# Patient Record
Sex: FEMALE
[De-identification: names, ages, dates, MRNs, and addresses within clinical notes are randomized per-mention and may not be internally consistent; named-entity substitution may affect disease eponyms.]

---

## 2018-12-11 ENCOUNTER — APPOINTMENT (OUTPATIENT)
Dept: NEUROSURGERY | Facility: CLINIC | Age: 48
End: 2018-12-11
Payer: COMMERCIAL

## 2018-12-11 PROBLEM — Z00.00 ENCOUNTER FOR PREVENTIVE HEALTH EXAMINATION: Status: ACTIVE | Noted: 2018-12-11

## 2018-12-11 PROCEDURE — 99205 OFFICE O/P NEW HI 60 MIN: CPT

## 2018-12-12 VITALS — WEIGHT: 170 LBS | BODY MASS INDEX: 30.12 KG/M2 | HEIGHT: 63 IN

## 2018-12-12 DIAGNOSIS — Z86.79 PERSONAL HISTORY OF OTHER DISEASES OF THE CIRCULATORY SYSTEM: ICD-10-CM

## 2019-02-05 ENCOUNTER — APPOINTMENT (OUTPATIENT)
Dept: NEUROSURGERY | Facility: CLINIC | Age: 49
End: 2019-02-05
Payer: COMMERCIAL

## 2019-02-05 ENCOUNTER — RESULT REVIEW (OUTPATIENT)
Age: 49
End: 2019-02-05

## 2019-02-05 PROCEDURE — 63030 LAMOT DCMPRN NRV RT 1 LMBR: CPT | Mod: LT

## 2019-02-19 ENCOUNTER — APPOINTMENT (OUTPATIENT)
Dept: NEUROSURGERY | Facility: CLINIC | Age: 49
End: 2019-02-19
Payer: COMMERCIAL

## 2019-02-19 PROCEDURE — 99024 POSTOP FOLLOW-UP VISIT: CPT

## 2019-02-23 NOTE — HISTORY OF PRESENT ILLNESS
[FreeTextEntry1] : s/p microdiscectomy. No further radicular pain. Still with some numbness, given reassurances that this is expected. Otherwise very happy with results.

## 2019-02-26 ENCOUNTER — APPOINTMENT (OUTPATIENT)
Dept: NEUROSURGERY | Facility: CLINIC | Age: 49
End: 2019-02-26
Payer: COMMERCIAL

## 2019-02-26 PROCEDURE — 99024 POSTOP FOLLOW-UP VISIT: CPT

## 2019-02-26 NOTE — HISTORY OF PRESENT ILLNESS
[FreeTextEntry1] : s/p left L5-S1 laminectomy, microdiscectomy 2/5/2019.  She was doing well for 2 and a half weeks.  No pain.  However, developed recurrence of pain.  + left foot drop.\par \par MRI ordered and showed L4-5 herniated disc, recurrent left L5-S1 herniated disc.\par \par Recommend ER, admission for IV steroids, pain medication.  OR urgently for left L4-5, L5-S1 laminectomy, microdiscectomy.

## 2019-02-27 ENCOUNTER — INPATIENT (INPATIENT)
Facility: HOSPITAL | Age: 49
LOS: 1 days | Discharge: HOME | End: 2019-03-01
Attending: NEUROLOGICAL SURGERY | Admitting: NEUROLOGICAL SURGERY

## 2019-02-27 VITALS
RESPIRATION RATE: 18 BRPM | TEMPERATURE: 97 F | HEART RATE: 73 BPM | DIASTOLIC BLOOD PRESSURE: 81 MMHG | OXYGEN SATURATION: 99 % | SYSTOLIC BLOOD PRESSURE: 169 MMHG

## 2019-02-27 LAB
ALBUMIN SERPL ELPH-MCNC: 4.5 G/DL — SIGNIFICANT CHANGE UP (ref 3.5–5.2)
ALP SERPL-CCNC: 57 U/L — SIGNIFICANT CHANGE UP (ref 30–115)
ALT FLD-CCNC: 12 U/L — SIGNIFICANT CHANGE UP (ref 0–41)
ANION GAP SERPL CALC-SCNC: 12 MMOL/L — SIGNIFICANT CHANGE UP (ref 7–14)
AST SERPL-CCNC: 15 U/L — SIGNIFICANT CHANGE UP (ref 0–41)
BASOPHILS # BLD AUTO: 0.04 K/UL — SIGNIFICANT CHANGE UP (ref 0–0.2)
BASOPHILS NFR BLD AUTO: 0.4 % — SIGNIFICANT CHANGE UP (ref 0–1)
BILIRUB SERPL-MCNC: 0.4 MG/DL — SIGNIFICANT CHANGE UP (ref 0.2–1.2)
BUN SERPL-MCNC: 18 MG/DL — SIGNIFICANT CHANGE UP (ref 10–20)
CALCIUM SERPL-MCNC: 9.1 MG/DL — SIGNIFICANT CHANGE UP (ref 8.5–10.1)
CHLORIDE SERPL-SCNC: 103 MMOL/L — SIGNIFICANT CHANGE UP (ref 98–110)
CO2 SERPL-SCNC: 25 MMOL/L — SIGNIFICANT CHANGE UP (ref 17–32)
CREAT SERPL-MCNC: 0.6 MG/DL — LOW (ref 0.7–1.5)
EOSINOPHIL # BLD AUTO: 0.03 K/UL — SIGNIFICANT CHANGE UP (ref 0–0.7)
EOSINOPHIL NFR BLD AUTO: 0.3 % — SIGNIFICANT CHANGE UP (ref 0–8)
GLUCOSE SERPL-MCNC: 114 MG/DL — HIGH (ref 70–99)
HCT VFR BLD CALC: 42.2 % — SIGNIFICANT CHANGE UP (ref 37–47)
HGB BLD-MCNC: 14.2 G/DL — SIGNIFICANT CHANGE UP (ref 12–16)
IMM GRANULOCYTES NFR BLD AUTO: 0.5 % — HIGH (ref 0.1–0.3)
LYMPHOCYTES # BLD AUTO: 1.78 K/UL — SIGNIFICANT CHANGE UP (ref 1.2–3.4)
LYMPHOCYTES # BLD AUTO: 16 % — LOW (ref 20.5–51.1)
MCHC RBC-ENTMCNC: 30 PG — SIGNIFICANT CHANGE UP (ref 27–31)
MCHC RBC-ENTMCNC: 33.6 G/DL — SIGNIFICANT CHANGE UP (ref 32–37)
MCV RBC AUTO: 89.2 FL — SIGNIFICANT CHANGE UP (ref 81–99)
MONOCYTES # BLD AUTO: 0.52 K/UL — SIGNIFICANT CHANGE UP (ref 0.1–0.6)
MONOCYTES NFR BLD AUTO: 4.7 % — SIGNIFICANT CHANGE UP (ref 1.7–9.3)
NEUTROPHILS # BLD AUTO: 8.67 K/UL — HIGH (ref 1.4–6.5)
NEUTROPHILS NFR BLD AUTO: 78.1 % — HIGH (ref 42.2–75.2)
NRBC # BLD: 0 /100 WBCS — SIGNIFICANT CHANGE UP (ref 0–0)
PLATELET # BLD AUTO: 259 K/UL — SIGNIFICANT CHANGE UP (ref 130–400)
POTASSIUM SERPL-MCNC: 4.1 MMOL/L — SIGNIFICANT CHANGE UP (ref 3.5–5)
POTASSIUM SERPL-SCNC: 4.1 MMOL/L — SIGNIFICANT CHANGE UP (ref 3.5–5)
PROT SERPL-MCNC: 7.1 G/DL — SIGNIFICANT CHANGE UP (ref 6–8)
RBC # BLD: 4.73 M/UL — SIGNIFICANT CHANGE UP (ref 4.2–5.4)
RBC # FLD: 12.6 % — SIGNIFICANT CHANGE UP (ref 11.5–14.5)
SODIUM SERPL-SCNC: 140 MMOL/L — SIGNIFICANT CHANGE UP (ref 135–146)
WBC # BLD: 11.1 K/UL — HIGH (ref 4.8–10.8)
WBC # FLD AUTO: 11.1 K/UL — HIGH (ref 4.8–10.8)

## 2019-02-27 RX ORDER — DEXAMETHASONE 0.5 MG/5ML
4 ELIXIR ORAL EVERY 4 HOURS
Qty: 0 | Refills: 0 | Status: DISCONTINUED | OUTPATIENT
Start: 2019-02-27 | End: 2019-02-27

## 2019-02-27 RX ORDER — DEXAMETHASONE 0.5 MG/5ML
4 ELIXIR ORAL EVERY 6 HOURS
Qty: 0 | Refills: 0 | Status: DISCONTINUED | OUTPATIENT
Start: 2019-02-27 | End: 2019-02-28

## 2019-02-27 RX ORDER — ACETAMINOPHEN 500 MG
975 TABLET ORAL ONCE
Qty: 0 | Refills: 0 | Status: COMPLETED | OUTPATIENT
Start: 2019-02-27 | End: 2019-02-27

## 2019-02-27 NOTE — ED PROVIDER NOTE - ATTENDING CONTRIBUTION TO CARE
I personally evaluated the patient. I reviewed the Resident’s or Physician Assistant’s note (as assigned above), and agree with the findings and plan except as documented in my note.  48 yr F, hx of HTN and lumbosacral disc herniations, was sent in by her neurosurgeon Dr. Christensen to be admitted to go to the OR tomorrow for a discectomy. Pt had L5-S1 discectomy 3 weeks ago presents with lower back  pain with radiation to the left leg associated with left leg weakness. Symptoms started 1 wk ago and pt was evaluated by Dr. Christensen and found to now have L4 to L5 disc herniation. Denies trauma, although she recalls bending down prior to the onset of pain. No urinary or fecal incontinence, no saddle anesthesia by hx. VS reviewed, Head ncat, neck supple, no JVD, normal s1s2 without any murmurs, Lungs CTAB with normal work of breathing. abd +BS, s/nd/nt, BACK W/O MIDLINE TTP, + LEFT PARASPINAL TTP IN THE LUMBAR REGION, extremities wnl, AAOx 3, normal motor and sensory exams. Antalgic gait due to pain. No acute skin rashes. Plan is preop labs, neurosx consult and likely admit.

## 2019-02-27 NOTE — ED PROVIDER NOTE - OBJECTIVE STATEMENT
49 y/o F h/o L5-S1 discectomy 3 weeks ago by Dr Christensen p/w back pain. Pt was having intercourse when she felt new lower back pain that was excruciating. Pt was instructed by Dr Christensen to have another MRI, which showed a new L4-L5 disc herniation. Pt reports pain worsening when ambulating or moving her LE's. Left LE worse than right. A/w numbness/parasthesias. No bowel/bladder incontinence. No fevers, chills, nt sweats, paresis of LE's. No pain overlying prior incision site.

## 2019-02-27 NOTE — ED PROVIDER NOTE - CLINICAL SUMMARY MEDICAL DECISION MAKING FREE TEXT BOX
Pt with disc herniation was sent in by neurosurgeon Dr. Christensen for admission to go to the OR tomorrow. Nsx evaluated pt and recommended pain control and steriods.

## 2019-02-27 NOTE — H&P ADULT - HISTORY OF PRESENT ILLNESS
49 y/o female with hx of L5-S1 Lumbar microdiscectomy 3 weeks ago. Now presenting with new onset of back pain following sexual intercourse 3 days ago. Found to have new L4-L5 disc herniation now. 49 y/o female with hx of L5-S1 Lumbar microdiscectomy 3 weeks ago. Now presenting with new onset of back pain radiating to her left leg following bending over 3 days ago. Found to have new L4-L5 disc herniation now.

## 2019-02-27 NOTE — H&P ADULT - NSHPPHYSICALEXAM_GEN_ALL_CORE
GENERAL: NAD, awake/alert  HEAD:  Atraumatic, Normocephalic  EYES: EOMI, PERRLA, conjunctiva and sclera clear  NERVOUS SYSTEM:  Awake  alert oriented to self, place situation   Fund of knowledge, recent and remote memory are intact   Mood; normal affect   CN II-XII intact PERRRL, EOMI, no ptosis, no Nystagmus, normal shoulder shrug   Face symmetrical tongue is midline speech is clear and fluent  Motor: 5/5 UE/LE all muscle groups, GREGORIO   Sensory: No deficit to light touch

## 2019-02-27 NOTE — ED PROVIDER NOTE - PHYSICAL EXAMINATION
Constitutional: Well developed, well nourished. NAD. Good general hygiene  Head: Atraumatic.  Eyes: PERRLA. EOMI without discomfort.   ENT: No nasal discharge. Mucous membranes moist.  Neck: Supple. Painless ROM.  Cardiovascular: Regular rhythm. Regular rate. Normal S1 and S2. No murmurs. 2+ pulses in all extremities.   Pulmonary: Normal respiratory rate and effort. Lungs clear to auscultation bilaterally. No wheezing, rales, or rhonchi. Bilateral, equal lung expansion.   Abdominal: Soft. Nondistended. Nontender. No rebound or guarding.   Extremities: 4/5 strength in left LE, 5/5 strength in right LE. Sensation equal and intact in both LE's. (+) straight leg test on the left side.   Skin: Incision site is c/d/i. No oozing, bleeding, evidence of cellulitis or abscess.  Neuro: AAOx3. No focal neurological deficits. See extremities.   Psych: Normal mood. Normal affect.

## 2019-02-27 NOTE — ED PROVIDER NOTE - NS ED ROS FT
Constitutional: No fever, chills, night sweats.   Eyes:  No visual changes, eye pain or discharge.  ENMT:  No hearing changes, pain, no sore throat or runny nose, no difficulty swallowing  Cardiac:  No chest pain, SOB or edema. No chest pain with exertion.  Respiratory:  No cough or respiratory distress. No hemoptysis. No history of asthma or RAD.  GI:  No nausea, vomiting, diarrhea or abdominal pain.  :  No dysuria, frequency or burning.  MS:  B/l LE pain. Left worse than right. No myalgia, muscle weakness, joint pain.  Neuro:  No headache or weakness.  No LOC. Parasthesias down left leg.   Back: Lower back pain.  Skin:  No skin rash.   Endocrine: No history of thyroid disease or diabetes.

## 2019-02-27 NOTE — ED ADULT TRIAGE NOTE - CHIEF COMPLAINT QUOTE
pt is scheduled for a disectomy of the sciatica nerve tomorrow and was told by her doctor to come in today and get checked in as per patient

## 2019-02-27 NOTE — H&P ADULT - ASSESSMENT
47 y/o female with new L4-L5 disc herniation  - NPO  - Preop labs - CBC, CMP, PT/PTT/INR, T&S  - EKG, CXR  - Decadron 4mg Q6  - pain control  - Pt seen and examined by Dr. Christensen

## 2019-02-28 ENCOUNTER — RESULT REVIEW (OUTPATIENT)
Age: 49
End: 2019-02-28

## 2019-02-28 LAB
BLD GP AB SCN SERPL QL: SIGNIFICANT CHANGE UP
TYPE + AB SCN PNL BLD: SIGNIFICANT CHANGE UP

## 2019-02-28 PROCEDURE — 63035 LAMOT DCMPRN NRV RT EA ADDL: CPT | Mod: 82,58

## 2019-02-28 PROCEDURE — 63035 LAMOT DCMPRN NRV RT EA ADDL: CPT | Mod: 58

## 2019-02-28 PROCEDURE — 63030 LAMOT DCMPRN NRV RT 1 LMBR: CPT | Mod: 82,58

## 2019-02-28 PROCEDURE — 63030 LAMOT DCMPRN NRV RT 1 LMBR: CPT | Mod: 58

## 2019-02-28 RX ORDER — MORPHINE SULFATE 50 MG/1
2 CAPSULE, EXTENDED RELEASE ORAL EVERY 6 HOURS
Qty: 0 | Refills: 0 | Status: DISCONTINUED | OUTPATIENT
Start: 2019-02-28 | End: 2019-03-01

## 2019-02-28 RX ORDER — SODIUM CHLORIDE 9 MG/ML
1000 INJECTION, SOLUTION INTRAVENOUS
Qty: 0 | Refills: 0 | Status: DISCONTINUED | OUTPATIENT
Start: 2019-02-28 | End: 2019-02-28

## 2019-02-28 RX ORDER — SODIUM CHLORIDE 9 MG/ML
1000 INJECTION INTRAMUSCULAR; INTRAVENOUS; SUBCUTANEOUS
Qty: 0 | Refills: 0 | Status: DISCONTINUED | OUTPATIENT
Start: 2019-02-28 | End: 2019-02-28

## 2019-02-28 RX ORDER — METHOCARBAMOL 500 MG/1
500 TABLET, FILM COATED ORAL EVERY 6 HOURS
Qty: 0 | Refills: 0 | Status: DISCONTINUED | OUTPATIENT
Start: 2019-02-28 | End: 2019-03-01

## 2019-02-28 RX ORDER — HYDROMORPHONE HYDROCHLORIDE 2 MG/ML
1 INJECTION INTRAMUSCULAR; INTRAVENOUS; SUBCUTANEOUS
Qty: 0 | Refills: 0 | Status: DISCONTINUED | OUTPATIENT
Start: 2019-02-28 | End: 2019-02-28

## 2019-02-28 RX ORDER — ONDANSETRON 8 MG/1
4 TABLET, FILM COATED ORAL ONCE
Qty: 0 | Refills: 0 | Status: COMPLETED | OUTPATIENT
Start: 2019-02-28 | End: 2019-02-28

## 2019-02-28 RX ORDER — OXYCODONE AND ACETAMINOPHEN 5; 325 MG/1; MG/1
1 TABLET ORAL EVERY 6 HOURS
Qty: 0 | Refills: 0 | Status: DISCONTINUED | OUTPATIENT
Start: 2019-02-28 | End: 2019-02-28

## 2019-02-28 RX ORDER — SENNA PLUS 8.6 MG/1
2 TABLET ORAL AT BEDTIME
Qty: 0 | Refills: 0 | Status: DISCONTINUED | OUTPATIENT
Start: 2019-02-28 | End: 2019-03-01

## 2019-02-28 RX ORDER — OXYCODONE AND ACETAMINOPHEN 5; 325 MG/1; MG/1
2 TABLET ORAL ONCE
Qty: 0 | Refills: 0 | Status: DISCONTINUED | OUTPATIENT
Start: 2019-02-28 | End: 2019-02-28

## 2019-02-28 RX ORDER — SODIUM CHLORIDE 9 MG/ML
3 INJECTION INTRAMUSCULAR; INTRAVENOUS; SUBCUTANEOUS EVERY 8 HOURS
Qty: 0 | Refills: 0 | Status: DISCONTINUED | OUTPATIENT
Start: 2019-02-28 | End: 2019-03-01

## 2019-02-28 RX ORDER — OXYCODONE AND ACETAMINOPHEN 5; 325 MG/1; MG/1
1 TABLET ORAL EVERY 6 HOURS
Qty: 0 | Refills: 0 | Status: DISCONTINUED | OUTPATIENT
Start: 2019-02-28 | End: 2019-03-01

## 2019-02-28 RX ORDER — BENZOCAINE AND MENTHOL 5; 1 G/100ML; G/100ML
1 LIQUID ORAL
Qty: 0 | Refills: 0 | Status: DISCONTINUED | OUTPATIENT
Start: 2019-02-28 | End: 2019-03-01

## 2019-02-28 RX ORDER — MORPHINE SULFATE 50 MG/1
2 CAPSULE, EXTENDED RELEASE ORAL EVERY 6 HOURS
Qty: 0 | Refills: 0 | Status: DISCONTINUED | OUTPATIENT
Start: 2019-02-28 | End: 2019-02-28

## 2019-02-28 RX ORDER — CEFAZOLIN SODIUM 1 G
1000 VIAL (EA) INJECTION EVERY 8 HOURS
Qty: 0 | Refills: 0 | Status: COMPLETED | OUTPATIENT
Start: 2019-02-28 | End: 2019-03-01

## 2019-02-28 RX ORDER — CEFAZOLIN SODIUM 1 G
1000 VIAL (EA) INJECTION EVERY 8 HOURS
Qty: 0 | Refills: 0 | Status: DISCONTINUED | OUTPATIENT
Start: 2019-02-28 | End: 2019-02-28

## 2019-02-28 RX ORDER — HYDROMORPHONE HYDROCHLORIDE 2 MG/ML
0.5 INJECTION INTRAMUSCULAR; INTRAVENOUS; SUBCUTANEOUS
Qty: 0 | Refills: 0 | Status: DISCONTINUED | OUTPATIENT
Start: 2019-02-28 | End: 2019-02-28

## 2019-02-28 RX ORDER — DOCUSATE SODIUM 100 MG
100 CAPSULE ORAL THREE TIMES A DAY
Qty: 0 | Refills: 0 | Status: DISCONTINUED | OUTPATIENT
Start: 2019-02-28 | End: 2019-03-01

## 2019-02-28 RX ADMIN — SODIUM CHLORIDE 3 MILLILITER(S): 9 INJECTION INTRAMUSCULAR; INTRAVENOUS; SUBCUTANEOUS at 23:00

## 2019-02-28 RX ADMIN — HYDROMORPHONE HYDROCHLORIDE 0.5 MILLIGRAM(S): 2 INJECTION INTRAMUSCULAR; INTRAVENOUS; SUBCUTANEOUS at 11:37

## 2019-02-28 RX ADMIN — Medication 100 MILLIGRAM(S): at 13:56

## 2019-02-28 RX ADMIN — ONDANSETRON 4 MILLIGRAM(S): 8 TABLET, FILM COATED ORAL at 14:04

## 2019-02-28 RX ADMIN — METHOCARBAMOL 500 MILLIGRAM(S): 500 TABLET, FILM COATED ORAL at 18:25

## 2019-02-28 RX ADMIN — Medication 4 MILLIGRAM(S): at 06:16

## 2019-02-28 RX ADMIN — METHOCARBAMOL 500 MILLIGRAM(S): 500 TABLET, FILM COATED ORAL at 23:04

## 2019-02-28 RX ADMIN — Medication 975 MILLIGRAM(S): at 01:07

## 2019-02-28 RX ADMIN — METHOCARBAMOL 500 MILLIGRAM(S): 500 TABLET, FILM COATED ORAL at 12:32

## 2019-02-28 RX ADMIN — Medication 100 MILLIGRAM(S): at 22:22

## 2019-02-28 RX ADMIN — SENNA PLUS 2 TABLET(S): 8.6 TABLET ORAL at 22:22

## 2019-02-28 RX ADMIN — Medication 975 MILLIGRAM(S): at 03:18

## 2019-02-28 RX ADMIN — SODIUM CHLORIDE 125 MILLILITER(S): 9 INJECTION INTRAMUSCULAR; INTRAVENOUS; SUBCUTANEOUS at 01:07

## 2019-02-28 RX ADMIN — Medication 100 MILLIGRAM(S): at 13:53

## 2019-02-28 RX ADMIN — Medication 4 MILLIGRAM(S): at 01:07

## 2019-02-28 RX ADMIN — SODIUM CHLORIDE 100 MILLILITER(S): 9 INJECTION, SOLUTION INTRAVENOUS at 11:09

## 2019-02-28 RX ADMIN — SODIUM CHLORIDE 3 MILLILITER(S): 9 INJECTION INTRAMUSCULAR; INTRAVENOUS; SUBCUTANEOUS at 18:21

## 2019-02-28 NOTE — ED ADULT NURSE NOTE - CHPI ED NUR SYMPTOMS NEG
no anorexia/no fatigue/no motor function loss/no difficulty bearing weight/no numbness/no bladder dysfunction/no constipation/no bowel dysfunction/no neck tenderness/no tingling

## 2019-02-28 NOTE — PRE-ANESTHESIA EVALUATION ADULT - NSANTHOSAYNRD_GEN_A_CORE
No. KYRA screening performed.  STOP BANG Legend: 0-2 = LOW Risk; 3-4 = INTERMEDIATE Risk; 5-8 = HIGH Risk

## 2019-02-28 NOTE — BRIEF OPERATIVE NOTE - PROCEDURE
<<-----Click on this checkbox to enter Procedure Lumbar laminectomy  02/28/2019    Active  RGROBSOND

## 2019-02-28 NOTE — PROGRESS NOTE ADULT - SUBJECTIVE AND OBJECTIVE BOX
NEUROSURGERY POST OP NOTE:    POD# 0 S/P lumbar discectomy    S: mild pain       T(C): 35.8 (02-28-19 @ 14:15), Max: 36.8 (02-28-19 @ 11:09)  HR: 73 (02-28-19 @ 14:15) (72 - 100)  BP: 135/60 (02-28-19 @ 14:15) (129/78 - 169/81)  RR: 18 (02-28-19 @ 14:15) (12 - 18)  SpO2: 98% (02-28-19 @ 12:39) (95% - 100%)      02-28-19 @ 07:01  -  02-28-19 @ 19:54  --------------------------------------------------------  IN: 100 mL / OUT: 1 mL / NET: 99 mL        bisacodyl 5 milliGRAM(s) Oral every 12 hours PRN  ceFAZolin   IVPB 1000 milliGRAM(s) IV Intermittent every 8 hours  docusate sodium 100 milliGRAM(s) Oral three times a day  methocarbamol 500 milliGRAM(s) Oral every 6 hours  morphine  - Injectable 2 milliGRAM(s) IV Push every 6 hours PRN  oxyCODONE    5 mG/acetaminophen 325 mG 1 Tablet(s) Oral every 6 hours PRN  senna 2 Tablet(s) Oral at bedtime  sodium chloride 0.9% lock flush 3 milliLiter(s) IV Push every 8 hours      Exam: dressing dry, mild left lateral knee pain, good movement of lower extremities, no numbness, no calf tenderness, tolerating diet    WOUND- dressing dry, no swelling        Assessment: 48yFemale s/p 2 level MLD      Plan: PT, Rehab, analgesics

## 2019-02-28 NOTE — CHART NOTE - NSCHARTNOTEFT_GEN_A_CORE
PACU ANESTHESIA ADMISSION NOTE      Procedure: Lumbar laminectomy    Post op diagnosis:  Lumbar disc herniation      ____  Intubated  TV:______       Rate: ______      FiO2: ______    _x___  Patent Airway    __x__  Full return of protective reflexes    _x___  Full recovery from anesthesia / back to baseline     Vitals:   T:  98.2         R:  10                BP:  160/76                Sat: 98                  P:100       Mental Status:  _x___ Awake   __x___ Alert   _____ Drowsy   _____ Sedated    Nausea/Vomiting:  _x___ NO  ______Yes,   See Post - Op Orders          Pain Scale (0-10):  _____    Treatment: ____ None    x____ See Post - Op/PCA Orders    Post - Operative Fluids:   ____ Oral   x____ See Post - Op Orders    Plan: Discharge:   ____Home       _x____Floor     _____Critical Care    _____  Other:_moving all four extremities ________________    Comments:

## 2019-03-01 ENCOUNTER — TRANSCRIPTION ENCOUNTER (OUTPATIENT)
Age: 49
End: 2019-03-01

## 2019-03-01 VITALS
SYSTOLIC BLOOD PRESSURE: 152 MMHG | TEMPERATURE: 97 F | DIASTOLIC BLOOD PRESSURE: 70 MMHG | HEART RATE: 72 BPM | RESPIRATION RATE: 18 BRPM

## 2019-03-01 LAB — SURGICAL PATHOLOGY STUDY: SIGNIFICANT CHANGE UP

## 2019-03-01 RX ORDER — DOCUSATE SODIUM 100 MG
1 CAPSULE ORAL
Qty: 0 | Refills: 0 | DISCHARGE
Start: 2019-03-01

## 2019-03-01 RX ORDER — METHOCARBAMOL 500 MG/1
1 TABLET, FILM COATED ORAL
Qty: 40 | Refills: 0 | OUTPATIENT
Start: 2019-03-01 | End: 2019-03-10

## 2019-03-01 RX ORDER — SENNA PLUS 8.6 MG/1
2 TABLET ORAL
Qty: 0 | Refills: 0 | DISCHARGE
Start: 2019-03-01

## 2019-03-01 RX ADMIN — SODIUM CHLORIDE 3 MILLILITER(S): 9 INJECTION INTRAMUSCULAR; INTRAVENOUS; SUBCUTANEOUS at 06:50

## 2019-03-01 RX ADMIN — Medication 100 MILLIGRAM(S): at 06:49

## 2019-03-01 RX ADMIN — METHOCARBAMOL 500 MILLIGRAM(S): 500 TABLET, FILM COATED ORAL at 17:10

## 2019-03-01 RX ADMIN — METHOCARBAMOL 500 MILLIGRAM(S): 500 TABLET, FILM COATED ORAL at 06:50

## 2019-03-01 RX ADMIN — Medication 100 MILLIGRAM(S): at 13:23

## 2019-03-01 RX ADMIN — Medication 100 MILLIGRAM(S): at 06:50

## 2019-03-01 RX ADMIN — BENZOCAINE AND MENTHOL 1 LOZENGE: 5; 1 LIQUID ORAL at 06:50

## 2019-03-01 RX ADMIN — METHOCARBAMOL 500 MILLIGRAM(S): 500 TABLET, FILM COATED ORAL at 11:42

## 2019-03-01 NOTE — DISCHARGE NOTE ADULT - CARE PROVIDER_API CALL
Susan Christensen)  Neurological Surgery  [address], [address]  [place] [address]  Phone: [phone]  Fax: [phone]  Follow Up Time:

## 2019-03-01 NOTE — DISCHARGE NOTE ADULT - INSTRUCTIONS
May remove outer dressing in 2-3 days. May shower. Do no scrub incision. Keep clean and dry. No heavy lifting or bending reg diet

## 2019-03-01 NOTE — PROGRESS NOTE ADULT - SUBJECTIVE AND OBJECTIVE BOX
Subjective: 48yFemale with a pmhx of DISC DIRODER OF LUMBAR REGION  ^SCIATIC PAIN  MEWS Score  Lumbar disc herniation  Lumbar disc herniation  Disc disorder of lumbar region  Lumbar laminectomy  SCIATIC PAIN    POD#1 s/p L4-S1 Left lumbar discectomy    Pt seen and examined at bedside. PT admits she has mild incisional pain at this time. She admits her previous left lower extremity pain is now gone after surgery. She also admits her numbness and tingling to the left lower extremity has greatly improved.    Allergies    Allergy Status Unknown    Intolerances        Vital Signs Last 24 Hrs  T(C): 36.6 (01 Mar 2019 05:34), Max: 36.8 (28 Feb 2019 11:09)  T(F): 97.8 (01 Mar 2019 05:34), Max: 98.2 (28 Feb 2019 11:09)  HR: 71 (01 Mar 2019 05:34) (63 - 100)  BP: 112/54 (01 Mar 2019 05:34) (112/54 - 162/77)  BP(mean): --  RR: 18 (01 Mar 2019 05:34) (12 - 19)  SpO2: 98% (28 Feb 2019 12:39) (95% - 99%)      benzocaine 15 mG/menthol 3.6 mG Lozenge 1 Lozenge Oral every 1 hour PRN  bisacodyl 5 milliGRAM(s) Oral every 12 hours PRN  docusate sodium 100 milliGRAM(s) Oral three times a day  methocarbamol 500 milliGRAM(s) Oral every 6 hours  morphine  - Injectable 2 milliGRAM(s) IV Push every 6 hours PRN  oxyCODONE    5 mG/acetaminophen 325 mG 1 Tablet(s) Oral every 6 hours PRN  senna 2 Tablet(s) Oral at bedtime  sodium chloride 0.9% lock flush 3 milliLiter(s) IV Push every 8 hours        02-28-19 @ 07:01  -  03-01-19 @ 07:00  --------------------------------------------------------  IN: 100 mL / OUT: 2 mL / NET: 98 mL          Exam:  AAOX3. Verbal function intact  tongue midline, facial motions symmetric  PERRL  Motor: MAEx4, 5/5 power in b/l UE and LE  Sensation: intact to touch in all extremities        CBC Full  -  ( 27 Feb 2019 22:30 )  WBC Count : 11.10 K/uL  Hemoglobin : 14.2 g/dL  Hematocrit : 42.2 %  Platelet Count - Automated : 259 K/uL  Mean Cell Volume : 89.2 fL  Mean Cell Hemoglobin : 30.0 pg  Mean Cell Hemoglobin Concentration : 33.6 g/dL  Auto Neutrophil # : 8.67 K/uL  Auto Lymphocyte # : 1.78 K/uL  Auto Monocyte # : 0.52 K/uL  Auto Eosinophil # : 0.03 K/uL  Auto Basophil # : 0.04 K/uL  Auto Neutrophil % : 78.1 %  Auto Lymphocyte % : 16.0 %  Auto Monocyte % : 4.7 %  Auto Eosinophil % : 0.3 %  Auto Basophil % : 0.4 %    02-27    140  |  103  |  18  ----------------------------<  114<H>  4.1   |  25  |  0.6<L>    Ca    9.1      27 Feb 2019 22:30    TPro  7.1  /  Alb  4.5  /  TBili  0.4  /  DBili  x   /  AST  15  /  ALT  12  /  AlkPhos  57  02-27            Wound:  dressing clean, dry and intact    Assessment/Plan: as above  PT/rehab eval   likely d/c home today  pain well controlled  d/w attending

## 2019-03-01 NOTE — DISCHARGE NOTE ADULT - CARE PLAN
Principal Discharge DX:	Disc disorder of lumbar region  Goal:	s/p L4-S1 left lumbar discectomy  Assessment and plan of treatment:	s/p L4-S1 left lumbar discectomy

## 2019-03-01 NOTE — DISCHARGE NOTE ADULT - HOSPITAL COURSE
47 y/o female with hx of L5-S1 Lumbar microdiscectomy 3 weeks ago. Now presenting with new onset of back pain radiating to her left leg following bending over 3 days ago. Found to have new L4-L5 disc herniation now.    Pt underwent L4-S1 Left lumbar discectomy. She went to [facility] post op and worked with PT. They cleared her for discharge with Rolling walker and 3 in 1 commode for which she was given scripts. She admits her symptoms are much improved after surgery. She no longer has radicular pain down the left leg and her numbness and tingling is improved from pre-op. She will be discharged home and follow up in 10-14 days.

## 2019-03-01 NOTE — PHYSICAL THERAPY INITIAL EVALUATION ADULT - GENERAL OBSERVATIONS, REHAB EVAL
10:00-10:31 Pt encountered seated in b/s chair in NAD. Pt observed ambulating on unit with RW with PCA.  Pt reports pain 4/10 on pain scale

## 2019-03-01 NOTE — DISCHARGE NOTE ADULT - MEDICATION SUMMARY - MEDICATIONS TO TAKE
I will START or STAY ON the medications listed below when I get home from the hospital:    oxycodone-acetaminophen 5 mg-325 mg oral tablet  -- 1 tab(s) by mouth every 6 hours, As needed, Severe Pain (7 - 10) MDD:4  -- Indication: For pain control    docusate sodium 100 mg oral capsule  -- 1 cap(s) by mouth 3 times a day  -- Indication: For constipation    senna oral tablet  -- 2 tab(s) by mouth once a day (at bedtime)  -- Indication: For constipation    methocarbamol 500 mg oral tablet  -- 1 tab(s) by mouth every 6 hours, As Needed -for muscle spasm   -- Indication: For muscle relaxer

## 2019-03-01 NOTE — DISCHARGE NOTE ADULT - PATIENT PORTAL LINK FT
You can access the [facility] Patient Portal, offered by [facility], by registering with the following website: [email or web address]

## 2019-03-01 NOTE — PROGRESS NOTE ADULT - SUBJECTIVE AND OBJECTIVE BOX
Does not require rehab. consult. at this time.  Amb. very nicely with PT postop.  For discharge home today.

## 2019-03-06 DIAGNOSIS — R20.0 ANESTHESIA OF SKIN: ICD-10-CM

## 2019-03-06 DIAGNOSIS — M51.26 OTHER INTERVERTEBRAL DISC DISPLACEMENT, LUMBAR REGION: ICD-10-CM

## 2019-03-06 DIAGNOSIS — Z79.891 LONG TERM (CURRENT) USE OF OPIATE ANALGESIC: ICD-10-CM

## 2019-03-06 DIAGNOSIS — M51.27 OTHER INTERVERTEBRAL DISC DISPLACEMENT, LUMBOSACRAL REGION: ICD-10-CM

## 2019-03-19 ENCOUNTER — APPOINTMENT (OUTPATIENT)
Dept: NEUROSURGERY | Facility: CLINIC | Age: 49
End: 2019-03-19
Payer: COMMERCIAL

## 2019-03-19 PROCEDURE — 99024 POSTOP FOLLOW-UP VISIT: CPT

## 2019-03-19 NOTE — HISTORY OF PRESENT ILLNESS
[FreeTextEntry1] : s/p L5-S1 microdiscectomy, followed by reherniation and new L4-5 herniation s/p surgery.  \par \par No leg pain\par \par Complains of back pain\par \par Some serosanguinous drainage from the wound.  No erythema\par \par Wound care - baci ointment, dressing changes\par clindamycin x 10 days\par \par Return in 1 week.

## 2019-03-22 ENCOUNTER — INPATIENT (INPATIENT)
Facility: HOSPITAL | Age: 49
LOS: 18 days | Discharge: HOME HEALTH | End: 2019-04-10
Attending: NEUROLOGICAL SURGERY | Admitting: NEUROLOGICAL SURGERY
Payer: COMMERCIAL

## 2019-03-22 VITALS
TEMPERATURE: 99 F | SYSTOLIC BLOOD PRESSURE: 115 MMHG | OXYGEN SATURATION: 97 % | HEART RATE: 91 BPM | RESPIRATION RATE: 18 BRPM | DIASTOLIC BLOOD PRESSURE: 63 MMHG

## 2019-03-22 DIAGNOSIS — T81.40XA INFECTION FOLLOWING A PROCEDURE, UNSPECIFIED, INITIAL ENCOUNTER: ICD-10-CM

## 2019-03-22 DIAGNOSIS — Z98.890 OTHER SPECIFIED POSTPROCEDURAL STATES: Chronic | ICD-10-CM

## 2019-03-22 LAB
ALBUMIN SERPL ELPH-MCNC: 3.8 G/DL — SIGNIFICANT CHANGE UP (ref 3.5–5.2)
ALP SERPL-CCNC: 84 U/L — SIGNIFICANT CHANGE UP (ref 30–115)
ALT FLD-CCNC: 23 U/L — SIGNIFICANT CHANGE UP (ref 0–41)
ANION GAP SERPL CALC-SCNC: 13 MMOL/L — SIGNIFICANT CHANGE UP (ref 7–14)
APTT BLD: SIGNIFICANT CHANGE UP SEC (ref 27–39.2)
AST SERPL-CCNC: 18 U/L — SIGNIFICANT CHANGE UP (ref 0–41)
BASE EXCESS BLDV CALC-SCNC: 2.6 MMOL/L — HIGH (ref -2–2)
BASOPHILS # BLD AUTO: 0.02 K/UL — SIGNIFICANT CHANGE UP (ref 0–0.2)
BASOPHILS NFR BLD AUTO: 0.3 % — SIGNIFICANT CHANGE UP (ref 0–1)
BILIRUB SERPL-MCNC: 0.6 MG/DL — SIGNIFICANT CHANGE UP (ref 0.2–1.2)
BLD GP AB SCN SERPL QL: SIGNIFICANT CHANGE UP
BUN SERPL-MCNC: 10 MG/DL — SIGNIFICANT CHANGE UP (ref 10–20)
CA-I SERPL-SCNC: 1.14 MMOL/L — SIGNIFICANT CHANGE UP (ref 1.12–1.3)
CALCIUM SERPL-MCNC: 8.7 MG/DL — SIGNIFICANT CHANGE UP (ref 8.5–10.1)
CHLORIDE SERPL-SCNC: 103 MMOL/L — SIGNIFICANT CHANGE UP (ref 98–110)
CO2 SERPL-SCNC: 23 MMOL/L — SIGNIFICANT CHANGE UP (ref 17–32)
CREAT SERPL-MCNC: 0.5 MG/DL — LOW (ref 0.7–1.5)
CRP SERPL-MCNC: 7.86 MG/DL — HIGH (ref 0–0.4)
EOSINOPHIL # BLD AUTO: 0.01 K/UL — SIGNIFICANT CHANGE UP (ref 0–0.7)
EOSINOPHIL NFR BLD AUTO: 0.1 % — SIGNIFICANT CHANGE UP (ref 0–8)
ERYTHROCYTE [SEDIMENTATION RATE] IN BLOOD: 63 MM/HR — HIGH (ref 0–20)
GAS PNL BLDV: 137 MMOL/L — SIGNIFICANT CHANGE UP (ref 136–145)
GAS PNL BLDV: SIGNIFICANT CHANGE UP
GLUCOSE SERPL-MCNC: 123 MG/DL — HIGH (ref 70–99)
HCO3 BLDV-SCNC: 27 MMOL/L — SIGNIFICANT CHANGE UP (ref 22–29)
HCT VFR BLD CALC: 38.5 % — SIGNIFICANT CHANGE UP (ref 37–47)
HCT VFR BLDA CALC: 43.1 % — SIGNIFICANT CHANGE UP (ref 34–44)
HGB BLD CALC-MCNC: 14.1 G/DL — SIGNIFICANT CHANGE UP (ref 14–18)
HGB BLD-MCNC: 13 G/DL — SIGNIFICANT CHANGE UP (ref 12–16)
IMM GRANULOCYTES NFR BLD AUTO: 0.5 % — HIGH (ref 0.1–0.3)
INR BLD: 1.1 RATIO — SIGNIFICANT CHANGE UP (ref 0.65–1.3)
LACTATE BLDV-MCNC: 0.8 MMOL/L — SIGNIFICANT CHANGE UP (ref 0.5–1.6)
LYMPHOCYTES # BLD AUTO: 0.7 K/UL — LOW (ref 1.2–3.4)
LYMPHOCYTES # BLD AUTO: 9.1 % — LOW (ref 20.5–51.1)
MCHC RBC-ENTMCNC: 30.2 PG — SIGNIFICANT CHANGE UP (ref 27–31)
MCHC RBC-ENTMCNC: 33.8 G/DL — SIGNIFICANT CHANGE UP (ref 32–37)
MCV RBC AUTO: 89.3 FL — SIGNIFICANT CHANGE UP (ref 81–99)
MONOCYTES # BLD AUTO: 0.58 K/UL — SIGNIFICANT CHANGE UP (ref 0.1–0.6)
MONOCYTES NFR BLD AUTO: 7.5 % — SIGNIFICANT CHANGE UP (ref 1.7–9.3)
NEUTROPHILS # BLD AUTO: 6.37 K/UL — SIGNIFICANT CHANGE UP (ref 1.4–6.5)
NEUTROPHILS NFR BLD AUTO: 82.5 % — HIGH (ref 42.2–75.2)
NRBC # BLD: 0 /100 WBCS — SIGNIFICANT CHANGE UP (ref 0–0)
PCO2 BLDV: 39 MMHG — LOW (ref 41–51)
PH BLDV: 7.45 — HIGH (ref 7.26–7.43)
PLATELET # BLD AUTO: 214 K/UL — SIGNIFICANT CHANGE UP (ref 130–400)
PO2 BLDV: 50 MMHG — HIGH (ref 20–40)
POTASSIUM BLDV-SCNC: 3.5 MMOL/L — SIGNIFICANT CHANGE UP (ref 3.3–5.6)
POTASSIUM SERPL-MCNC: 3.6 MMOL/L — SIGNIFICANT CHANGE UP (ref 3.5–5)
POTASSIUM SERPL-SCNC: 3.6 MMOL/L — SIGNIFICANT CHANGE UP (ref 3.5–5)
PROT SERPL-MCNC: 6.4 G/DL — SIGNIFICANT CHANGE UP (ref 6–8)
PROTHROM AB SERPL-ACNC: 12.6 SEC — SIGNIFICANT CHANGE UP (ref 9.95–12.87)
RBC # BLD: 4.31 M/UL — SIGNIFICANT CHANGE UP (ref 4.2–5.4)
RBC # FLD: 12.5 % — SIGNIFICANT CHANGE UP (ref 11.5–14.5)
SAO2 % BLDV: 88 % — SIGNIFICANT CHANGE UP
SODIUM SERPL-SCNC: 139 MMOL/L — SIGNIFICANT CHANGE UP (ref 135–146)
TYPE + AB SCN PNL BLD: SIGNIFICANT CHANGE UP
WBC # BLD: 7.72 K/UL — SIGNIFICANT CHANGE UP (ref 4.8–10.8)
WBC # FLD AUTO: 7.72 K/UL — SIGNIFICANT CHANGE UP (ref 4.8–10.8)

## 2019-03-22 PROCEDURE — 22015 I&D ABSCESS P-SPINE L/S/LS: CPT | Mod: AS,78

## 2019-03-22 PROCEDURE — 22015 I&D ABSCESS P-SPINE L/S/LS: CPT | Mod: 78

## 2019-03-22 RX ORDER — MORPHINE SULFATE 50 MG/1
8 CAPSULE, EXTENDED RELEASE ORAL ONCE
Qty: 0 | Refills: 0 | Status: DISCONTINUED | OUTPATIENT
Start: 2019-03-22 | End: 2019-03-22

## 2019-03-22 RX ORDER — PREGABALIN 225 MG/1
1000 CAPSULE ORAL DAILY
Qty: 0 | Refills: 0 | Status: DISCONTINUED | OUTPATIENT
Start: 2019-03-22 | End: 2019-04-10

## 2019-03-22 RX ORDER — LABETALOL HCL 100 MG
100 TABLET ORAL DAILY
Qty: 0 | Refills: 0 | Status: DISCONTINUED | OUTPATIENT
Start: 2019-03-22 | End: 2019-04-10

## 2019-03-22 RX ORDER — SENNA PLUS 8.6 MG/1
2 TABLET ORAL AT BEDTIME
Qty: 0 | Refills: 0 | Status: DISCONTINUED | OUTPATIENT
Start: 2019-03-22 | End: 2019-04-10

## 2019-03-22 RX ORDER — MORPHINE SULFATE 50 MG/1
2 CAPSULE, EXTENDED RELEASE ORAL EVERY 4 HOURS
Qty: 0 | Refills: 0 | Status: DISCONTINUED | OUTPATIENT
Start: 2019-03-22 | End: 2019-03-22

## 2019-03-22 RX ORDER — SODIUM CHLORIDE 9 MG/ML
1000 INJECTION, SOLUTION INTRAVENOUS ONCE
Qty: 0 | Refills: 0 | Status: COMPLETED | OUTPATIENT
Start: 2019-03-22 | End: 2019-03-22

## 2019-03-22 RX ORDER — DOCUSATE SODIUM 100 MG
100 CAPSULE ORAL THREE TIMES A DAY
Qty: 0 | Refills: 0 | Status: DISCONTINUED | OUTPATIENT
Start: 2019-03-22 | End: 2019-04-10

## 2019-03-22 RX ORDER — SENNA PLUS 8.6 MG/1
2 TABLET ORAL AT BEDTIME
Qty: 0 | Refills: 0 | Status: DISCONTINUED | OUTPATIENT
Start: 2019-03-22 | End: 2019-03-22

## 2019-03-22 RX ORDER — PANTOPRAZOLE SODIUM 20 MG/1
40 TABLET, DELAYED RELEASE ORAL
Qty: 0 | Refills: 0 | Status: DISCONTINUED | OUTPATIENT
Start: 2019-03-22 | End: 2019-03-22

## 2019-03-22 RX ORDER — SODIUM CHLORIDE 9 MG/ML
1000 INJECTION, SOLUTION INTRAVENOUS
Qty: 0 | Refills: 0 | Status: DISCONTINUED | OUTPATIENT
Start: 2019-03-22 | End: 2019-03-27

## 2019-03-22 RX ORDER — DOCUSATE SODIUM 100 MG
100 CAPSULE ORAL THREE TIMES A DAY
Qty: 0 | Refills: 0 | Status: DISCONTINUED | OUTPATIENT
Start: 2019-03-22 | End: 2019-03-22

## 2019-03-22 RX ORDER — OXYCODONE AND ACETAMINOPHEN 5; 325 MG/1; MG/1
1 TABLET ORAL EVERY 4 HOURS
Qty: 0 | Refills: 0 | Status: DISCONTINUED | OUTPATIENT
Start: 2019-03-22 | End: 2019-03-24

## 2019-03-22 RX ORDER — HYDROMORPHONE HYDROCHLORIDE 2 MG/ML
1 INJECTION INTRAMUSCULAR; INTRAVENOUS; SUBCUTANEOUS
Qty: 0 | Refills: 0 | Status: DISCONTINUED | OUTPATIENT
Start: 2019-03-22 | End: 2019-03-22

## 2019-03-22 RX ORDER — ONDANSETRON 8 MG/1
4 TABLET, FILM COATED ORAL ONCE
Qty: 0 | Refills: 0 | Status: DISCONTINUED | OUTPATIENT
Start: 2019-03-22 | End: 2019-03-22

## 2019-03-22 RX ORDER — SODIUM CHLORIDE 9 MG/ML
1000 INJECTION, SOLUTION INTRAVENOUS
Qty: 0 | Refills: 0 | Status: DISCONTINUED | OUTPATIENT
Start: 2019-03-22 | End: 2019-03-22

## 2019-03-22 RX ORDER — LABETALOL HCL 100 MG
100 TABLET ORAL DAILY
Qty: 0 | Refills: 0 | Status: DISCONTINUED | OUTPATIENT
Start: 2019-03-22 | End: 2019-03-22

## 2019-03-22 RX ORDER — METHOCARBAMOL 500 MG/1
500 TABLET, FILM COATED ORAL EVERY 6 HOURS
Qty: 0 | Refills: 0 | Status: DISCONTINUED | OUTPATIENT
Start: 2019-03-22 | End: 2019-03-22

## 2019-03-22 RX ORDER — VANCOMYCIN HCL 1 G
1000 VIAL (EA) INTRAVENOUS EVERY 12 HOURS
Qty: 0 | Refills: 0 | Status: DISCONTINUED | OUTPATIENT
Start: 2019-03-22 | End: 2019-03-26

## 2019-03-22 RX ORDER — ONDANSETRON 8 MG/1
4 TABLET, FILM COATED ORAL EVERY 6 HOURS
Qty: 0 | Refills: 0 | Status: DISCONTINUED | OUTPATIENT
Start: 2019-03-22 | End: 2019-04-10

## 2019-03-22 RX ORDER — PREGABALIN 225 MG/1
1000 CAPSULE ORAL DAILY
Qty: 0 | Refills: 0 | Status: DISCONTINUED | OUTPATIENT
Start: 2019-03-22 | End: 2019-03-22

## 2019-03-22 RX ORDER — OXYCODONE AND ACETAMINOPHEN 5; 325 MG/1; MG/1
1 TABLET ORAL EVERY 4 HOURS
Qty: 0 | Refills: 0 | Status: DISCONTINUED | OUTPATIENT
Start: 2019-03-22 | End: 2019-03-22

## 2019-03-22 RX ORDER — ONDANSETRON 8 MG/1
4 TABLET, FILM COATED ORAL EVERY 6 HOURS
Qty: 0 | Refills: 0 | Status: DISCONTINUED | OUTPATIENT
Start: 2019-03-22 | End: 2019-03-22

## 2019-03-22 RX ORDER — CEFEPIME 1 G/1
1000 INJECTION, POWDER, FOR SOLUTION INTRAMUSCULAR; INTRAVENOUS EVERY 8 HOURS
Qty: 0 | Refills: 0 | Status: DISCONTINUED | OUTPATIENT
Start: 2019-03-22 | End: 2019-03-25

## 2019-03-22 RX ORDER — OXYCODONE AND ACETAMINOPHEN 5; 325 MG/1; MG/1
2 TABLET ORAL EVERY 4 HOURS
Qty: 0 | Refills: 0 | Status: DISCONTINUED | OUTPATIENT
Start: 2019-03-22 | End: 2019-03-29

## 2019-03-22 RX ORDER — HYDROMORPHONE HYDROCHLORIDE 2 MG/ML
0.5 INJECTION INTRAMUSCULAR; INTRAVENOUS; SUBCUTANEOUS
Qty: 0 | Refills: 0 | Status: DISCONTINUED | OUTPATIENT
Start: 2019-03-22 | End: 2019-03-22

## 2019-03-22 RX ORDER — KETOROLAC TROMETHAMINE 30 MG/ML
30 SYRINGE (ML) INJECTION ONCE
Qty: 0 | Refills: 0 | Status: DISCONTINUED | OUTPATIENT
Start: 2019-03-22 | End: 2019-03-22

## 2019-03-22 RX ORDER — ACETAMINOPHEN 500 MG
650 TABLET ORAL EVERY 6 HOURS
Qty: 0 | Refills: 0 | Status: DISCONTINUED | OUTPATIENT
Start: 2019-03-22 | End: 2019-03-22

## 2019-03-22 RX ORDER — DIAZEPAM 5 MG
5 TABLET ORAL EVERY 8 HOURS
Qty: 0 | Refills: 0 | Status: DISCONTINUED | OUTPATIENT
Start: 2019-03-22 | End: 2019-03-29

## 2019-03-22 RX ORDER — ACETAMINOPHEN 500 MG
650 TABLET ORAL EVERY 6 HOURS
Qty: 0 | Refills: 0 | Status: DISCONTINUED | OUTPATIENT
Start: 2019-03-22 | End: 2019-04-10

## 2019-03-22 RX ORDER — PANTOPRAZOLE SODIUM 20 MG/1
40 TABLET, DELAYED RELEASE ORAL
Qty: 0 | Refills: 0 | Status: DISCONTINUED | OUTPATIENT
Start: 2019-03-22 | End: 2019-04-10

## 2019-03-22 RX ORDER — OXYCODONE AND ACETAMINOPHEN 5; 325 MG/1; MG/1
2 TABLET ORAL EVERY 4 HOURS
Qty: 0 | Refills: 0 | Status: DISCONTINUED | OUTPATIENT
Start: 2019-03-22 | End: 2019-03-22

## 2019-03-22 RX ORDER — MORPHINE SULFATE 50 MG/1
2 CAPSULE, EXTENDED RELEASE ORAL EVERY 4 HOURS
Qty: 0 | Refills: 0 | Status: DISCONTINUED | OUTPATIENT
Start: 2019-03-22 | End: 2019-03-29

## 2019-03-22 RX ADMIN — SODIUM CHLORIDE 1000 MILLILITER(S): 9 INJECTION, SOLUTION INTRAVENOUS at 09:05

## 2019-03-22 RX ADMIN — PREGABALIN 1000 MICROGRAM(S): 225 CAPSULE ORAL at 12:15

## 2019-03-22 RX ADMIN — MORPHINE SULFATE 8 MILLIGRAM(S): 50 CAPSULE, EXTENDED RELEASE ORAL at 09:05

## 2019-03-22 RX ADMIN — HYDROMORPHONE HYDROCHLORIDE 0.5 MILLIGRAM(S): 2 INJECTION INTRAMUSCULAR; INTRAVENOUS; SUBCUTANEOUS at 19:45

## 2019-03-22 RX ADMIN — MORPHINE SULFATE 2 MILLIGRAM(S): 50 CAPSULE, EXTENDED RELEASE ORAL at 13:15

## 2019-03-22 RX ADMIN — SODIUM CHLORIDE 100 MILLILITER(S): 9 INJECTION, SOLUTION INTRAVENOUS at 19:30

## 2019-03-22 RX ADMIN — SODIUM CHLORIDE 75 MILLILITER(S): 9 INJECTION, SOLUTION INTRAVENOUS at 13:14

## 2019-03-22 RX ADMIN — Medication 30 MILLIGRAM(S): at 22:00

## 2019-03-22 RX ADMIN — HYDROMORPHONE HYDROCHLORIDE 0.5 MILLIGRAM(S): 2 INJECTION INTRAMUSCULAR; INTRAVENOUS; SUBCUTANEOUS at 20:30

## 2019-03-22 RX ADMIN — Medication 30 MILLIGRAM(S): at 22:07

## 2019-03-22 RX ADMIN — Medication 1 TABLET(S): at 12:13

## 2019-03-22 NOTE — ED PROVIDER NOTE - PROGRESS NOTE DETAILS
Patient seen and evaluated by me. Labs ordered. Spoke with neurosurgery who will come evaluate patient. Per neurosurgery - they will admit to Dr. Christensen's service. They will order MRIs and abx if needed, and will follow up blood work.

## 2019-03-22 NOTE — PROGRESS NOTE ADULT - ASSESSMENT
47 y/o female s/p T spine wound washout  - pain control  - IV abx  - ID c/s recommendations  - incentive spirometry  - DVT ppx  - will follow, will d/w attending

## 2019-03-22 NOTE — ED ADULT NURSE REASSESSMENT NOTE - NS ED NURSE REASSESS COMMENT FT1
Pt aox3, in no acute distress, admitted for lower back pain/back surgery wound infection. Pt had 2 back surgeries in Feb 2019 for lumbar herniated discs, skin intact, back wound dressing seen and done by MD,  at bedside, pt L ac 20g in place, pain medication given for 8/10 back pain, fluids given, pain now 2/10. Will continue to monitor the pt.

## 2019-03-22 NOTE — PROGRESS NOTE ADULT - SUBJECTIVE AND OBJECTIVE BOX
INTERVAL HPI/OVERNIGHT EVENTS:    HPI:  47 y/o female with reherniation of MLD s/p wound washout for wound infection. Doing well. Only complaining of mild periincisional pain but otherwise pain is well controlled. Denies any radiculopathy, weakness or sensory loss.       PAST MEDICAL & SURGICAL HISTORY:  Herniated disc, cervical  Hypertension  H/O lumbar discectomy      MEDICATIONS  (STANDING):  cefepime   IVPB 1000 milliGRAM(s) IV Intermittent every 8 hours  cyanocobalamin 1000 MICROGram(s) Oral daily  dextrose 5% + sodium chloride 0.45%. 1000 milliLiter(s) (75 mL/Hr) IV Continuous <Continuous>  diazepam    Tablet 5 milliGRAM(s) Oral every 8 hours  docusate sodium 100 milliGRAM(s) Oral three times a day  labetalol 100 milliGRAM(s) Oral daily  lactated ringers. 1000 milliLiter(s) (100 mL/Hr) IV Continuous <Continuous>  multivitamin 1 Tablet(s) Oral daily  pantoprazole    Tablet 40 milliGRAM(s) Oral before breakfast  senna 2 Tablet(s) Oral at bedtime  vancomycin  IVPB 1000 milliGRAM(s) IV Intermittent every 12 hours    MEDICATIONS  (PRN):  acetaminophen   Tablet .. 650 milliGRAM(s) Oral every 6 hours PRN Temp greater or equal to 38C (100.4F)  HYDROmorphone  Injectable 0.5 milliGRAM(s) IV Push every 10 minutes PRN Moderate Pain (4 - 6)  HYDROmorphone  Injectable 1 milliGRAM(s) IV Push every 10 minutes PRN Severe Pain (7 - 10)  morphine  - Injectable 2 milliGRAM(s) IV Push every 4 hours PRN Severe Pain (7 - 10)  ondansetron Injectable 4 milliGRAM(s) IV Push every 6 hours PRN Nausea and/or Vomiting  ondansetron Injectable 4 milliGRAM(s) IV Push once PRN Nausea and/or Vomiting  oxyCODONE    5 mG/acetaminophen 325 mG 2 Tablet(s) Oral every 4 hours PRN Moderate Pain (4 - 6)  oxyCODONE    5 mG/acetaminophen 325 mG 1 Tablet(s) Oral every 4 hours PRN Mild Pain (1 - 3)      Allergies    No Known Allergies    Intolerances          Vital Signs Last 24 Hrs  T(C): 37.1 (22 Mar 2019 20:00), Max: 37.4 (22 Mar 2019 07:33)  T(F): 98.7 (22 Mar 2019 20:00), Max: 99.3 (22 Mar 2019 07:33)  HR: 83 (22 Mar 2019 20:15) (70 - 98)  BP: 113/53 (22 Mar 2019 20:15) (113/53 - 145/55)  BP(mean): --  RR: 16 (22 Mar 2019 20:15) (14 - 20)  SpO2: 94% (22 Mar 2019 20:15) (94% - 100%)    PHYSICAL EXAM:    GENERAL: NAD, well-groomed, well-developed, awake/alert  HEAD:  Atraumatic, Normocephalic  EYES: EOMI, PERRLA, conjunctiva and sclera clear  NERVOUS SYSTEM:  Awake  alert oriented to self, place situation   Fund of knowledge, recent and remote memory are intact   Back: + midline incision with dressing saturated. BRIJESH in place and draining serosang fluid 25cc.  Mood; normal affect   CN II-XII intact PERRRL, EOMI, no ptosis, no Nystagmus, normal shoulder shrug   Face symmetrical tongue is midline speech is clear and fluent  Motor: 5/5 UE/LE all muscle groups   Sensory: No deficit to light touch   Gait is not tested          LABS:                        13.0   7.72  )-----------( 214      ( 22 Mar 2019 08:20 )             38.5     03-22    139  |  103  |  10  ----------------------------<  123<H>  3.6   |  23  |  0.5<L>    Ca    8.7      22 Mar 2019 08:20    TPro  6.4  /  Alb  3.8  /  TBili  0.6  /  DBili  x   /  AST  18  /  ALT  23  /  AlkPhos  84  03-22    PT/INR - ( 22 Mar 2019 08:20 )   PT: tnp sec;   INR: tnp ratio         PTT - ( 22 Mar 2019 08:20 )  PTT:tnp;see comment sample clotted. Dr Chand notified.03/22/19 09:32  Heparin Therapeutic Range: 76..97 Sec sec

## 2019-03-22 NOTE — H&P ADULT - HISTORY OF PRESENT ILLNESS
48 year old female with hx of L4- S1 laminectomy in feb 2019,. pt did well postop .  Last Tuesday pt went to Dr Christensen for drainage of incision and they cleaned it out pt felt better over the past few days she has felt worse , pt with subjective fevers . A lot of pain in her LB ., at present time pt does not report any radicular pain . Incision site was cleaned by Neurosurgery PA , no drainage at present time noted , small pin hole opening at the bottom of incision .

## 2019-03-22 NOTE — ED PROVIDER NOTE - PHYSICAL EXAMINATION
Vital Signs: I have reviewed the initial vital signs.  Constitutional: NAD, well-nourished, appears stated age, no acute distress.  HEENT: Airway patent, moist MM, no erythema/swelling/deformity of oral structures. EOMI, PERRLA.  CV: regular rate, regular rhythm, well-perfused extremities, 2+ b/l DP and radial pulses equal.  Lungs: BCTA, no increased WOB.  ABD: NTND, no guarding or rebound, no pulsatile mass, no hernias.   BACK: (+) diffuse tenderness on lower spine. (+) pus drainage from incision  MSK: Neck supple, nontender, nl ROM, no stepoff. Chest nontender. Back nontender in TLS spine or to b/l bony structures or flanks. Ext nontender, nl rom, no deformity.   INTEG: Skin warm, dry, no rash.  NEURO: A&Ox3, normal strength, nl sensation throughout, normal speech.   PSYCH: Calm, cooperative, normal affect and interaction.

## 2019-03-22 NOTE — ED PROVIDER NOTE - CLINICAL SUMMARY MEDICAL DECISION MAKING FREE TEXT BOX
Patient presented s/p discectomy with worsening pain in the lower back and pus drainage from the surgical site, previously on PO clindamycin which has not been controlling infection. Sent in by neurosurgery for evaluation. Seen by neurosurgery in ED and they requested admission to their service for possible MRI with IV abx. Patient otherwise HD stable, not septic. Will admit for further management.

## 2019-03-22 NOTE — H&P ADULT - NSHPPHYSICALEXAM_GEN_ALL_CORE
· Physical Examination: Vital Signs: I have reviewed the initial vital signs.  	Constitutional: NAD, well-nourished, appears stated age, no acute distress.  	HEENT: Airway patent, moist MM, no erythema/swelling/deformity of oral structures. EOMI, PERRLA.  	CV: regular rate, regular rhythm, well-perfused extremities, 2+ b/l DP and radial pulses equal.  	Lungs: BCTA, no increased WOB.  	ABD: NTND, no guarding or rebound, no pulsatile mass, no hernias.   	BACK: (+) diffuse tenderness on lower spine. (+) pus drainage from incision  	MSK: Neck supple, nontender, nl ROM, no stepoff. Chest nontender. Back nontender in TLS spine or to b/l bony structures or flanks. Ext nontender, nl rom, no deformity.   	INTEG: Skin warm, dry, no rash.  	  PSYCH: Calm, cooperative, normal affect and interaction.  Neuro  Alert, MAEX4  MS   RLE 5/5           LLE 5/5   DTRs 2+bilateral

## 2019-03-22 NOTE — ED PROVIDER NOTE - OBJECTIVE STATEMENT
48 year old female, pmhx herniated disks s/p L5-S1 and L4-L5 discectomy (surgery x2 in February 2019 by Dr. Christensen) presenting with worsening back pain. Pain is located to lower back, radiating down both legs, severe, sharp, 10/10, constant, worse with movement, better with rest. Patient's post op course was complicated by infection around the incision site and was on PO abx (clindamycin) without improvement so she was sent in by Dr. Christensen for evaluation. Patient admits to subjective fevers at home last night but otherwise denies headache, vision changes, weakness/numbness, confusion, URI symptoms, neck pain, chest pain, back pain, dyspnea, cough, palpitations, nausea, vomiting, abdominal pain, diarrhea, constipation, blood in stool/dark stools, urinary symptoms, vaginal bleeding/discharge, leg swelling, rash, recent travel or sick contacts.

## 2019-03-22 NOTE — ED ADULT NURSE NOTE - OBJECTIVE STATEMENT
Pt aox3, in no acute distress, c/o lower back pain x few days. Pt had recent back surgery, has wound to lower back, seen and dressing done by MD, pt was lined and lab, fluids and pain meds given. Will continue to monitor the pt.

## 2019-03-23 LAB
ALBUMIN SERPL ELPH-MCNC: 3.3 G/DL — LOW (ref 3.5–5.2)
ALP SERPL-CCNC: 71 U/L — SIGNIFICANT CHANGE UP (ref 30–115)
ALT FLD-CCNC: 20 U/L — SIGNIFICANT CHANGE UP (ref 0–41)
ANION GAP SERPL CALC-SCNC: 12 MMOL/L — SIGNIFICANT CHANGE UP (ref 7–14)
APTT BLD: 28.6 SEC — SIGNIFICANT CHANGE UP (ref 27–39.2)
AST SERPL-CCNC: 20 U/L — SIGNIFICANT CHANGE UP (ref 0–41)
BASOPHILS # BLD AUTO: 0.01 K/UL — SIGNIFICANT CHANGE UP (ref 0–0.2)
BASOPHILS NFR BLD AUTO: 0.1 % — SIGNIFICANT CHANGE UP (ref 0–1)
BILIRUB SERPL-MCNC: 0.3 MG/DL — SIGNIFICANT CHANGE UP (ref 0.2–1.2)
BUN SERPL-MCNC: 10 MG/DL — SIGNIFICANT CHANGE UP (ref 10–20)
CALCIUM SERPL-MCNC: 8.2 MG/DL — LOW (ref 8.5–10.1)
CHLORIDE SERPL-SCNC: 103 MMOL/L — SIGNIFICANT CHANGE UP (ref 98–110)
CO2 SERPL-SCNC: 23 MMOL/L — SIGNIFICANT CHANGE UP (ref 17–32)
CREAT SERPL-MCNC: 0.5 MG/DL — LOW (ref 0.7–1.5)
EOSINOPHIL # BLD AUTO: 0.02 K/UL — SIGNIFICANT CHANGE UP (ref 0–0.7)
EOSINOPHIL NFR BLD AUTO: 0.3 % — SIGNIFICANT CHANGE UP (ref 0–8)
ERYTHROCYTE [SEDIMENTATION RATE] IN BLOOD: 51 MM/HR — HIGH (ref 0–20)
GLUCOSE SERPL-MCNC: 154 MG/DL — HIGH (ref 70–99)
HCT VFR BLD CALC: 35 % — LOW (ref 37–47)
HGB BLD-MCNC: 11.6 G/DL — LOW (ref 12–16)
IMM GRANULOCYTES NFR BLD AUTO: 0.4 % — HIGH (ref 0.1–0.3)
INR BLD: 1.16 RATIO — SIGNIFICANT CHANGE UP (ref 0.65–1.3)
LYMPHOCYTES # BLD AUTO: 0.98 K/UL — LOW (ref 1.2–3.4)
LYMPHOCYTES # BLD AUTO: 13.9 % — LOW (ref 20.5–51.1)
MAGNESIUM SERPL-MCNC: 1.5 MG/DL — LOW (ref 1.8–2.4)
MCHC RBC-ENTMCNC: 29.9 PG — SIGNIFICANT CHANGE UP (ref 27–31)
MCHC RBC-ENTMCNC: 33.1 G/DL — SIGNIFICANT CHANGE UP (ref 32–37)
MCV RBC AUTO: 90.2 FL — SIGNIFICANT CHANGE UP (ref 81–99)
MONOCYTES # BLD AUTO: 0.67 K/UL — HIGH (ref 0.1–0.6)
MONOCYTES NFR BLD AUTO: 9.5 % — HIGH (ref 1.7–9.3)
NEUTROPHILS # BLD AUTO: 5.34 K/UL — SIGNIFICANT CHANGE UP (ref 1.4–6.5)
NEUTROPHILS NFR BLD AUTO: 75.8 % — HIGH (ref 42.2–75.2)
NRBC # BLD: 0 /100 WBCS — SIGNIFICANT CHANGE UP (ref 0–0)
PHOSPHATE SERPL-MCNC: 2.5 MG/DL — SIGNIFICANT CHANGE UP (ref 2.1–4.9)
PLATELET # BLD AUTO: 200 K/UL — SIGNIFICANT CHANGE UP (ref 130–400)
POTASSIUM SERPL-MCNC: 3.3 MMOL/L — LOW (ref 3.5–5)
POTASSIUM SERPL-SCNC: 3.3 MMOL/L — LOW (ref 3.5–5)
PROT SERPL-MCNC: 5.6 G/DL — LOW (ref 6–8)
PROTHROM AB SERPL-ACNC: 13.3 SEC — HIGH (ref 9.95–12.87)
RBC # BLD: 3.88 M/UL — LOW (ref 4.2–5.4)
RBC # FLD: 12.6 % — SIGNIFICANT CHANGE UP (ref 11.5–14.5)
SODIUM SERPL-SCNC: 138 MMOL/L — SIGNIFICANT CHANGE UP (ref 135–146)
WBC # BLD: 7.05 K/UL — SIGNIFICANT CHANGE UP (ref 4.8–10.8)
WBC # FLD AUTO: 7.05 K/UL — SIGNIFICANT CHANGE UP (ref 4.8–10.8)

## 2019-03-23 RX ORDER — HEPARIN SODIUM 5000 [USP'U]/ML
5000 INJECTION INTRAVENOUS; SUBCUTANEOUS EVERY 8 HOURS
Qty: 0 | Refills: 0 | Status: DISCONTINUED | OUTPATIENT
Start: 2019-03-23 | End: 2019-04-10

## 2019-03-23 RX ADMIN — MORPHINE SULFATE 2 MILLIGRAM(S): 50 CAPSULE, EXTENDED RELEASE ORAL at 21:00

## 2019-03-23 RX ADMIN — Medication 100 MILLIGRAM(S): at 05:50

## 2019-03-23 RX ADMIN — SODIUM CHLORIDE 75 MILLILITER(S): 9 INJECTION, SOLUTION INTRAVENOUS at 05:57

## 2019-03-23 RX ADMIN — HEPARIN SODIUM 5000 UNIT(S): 5000 INJECTION INTRAVENOUS; SUBCUTANEOUS at 21:42

## 2019-03-23 RX ADMIN — Medication 1 TABLET(S): at 11:05

## 2019-03-23 RX ADMIN — PANTOPRAZOLE SODIUM 40 MILLIGRAM(S): 20 TABLET, DELAYED RELEASE ORAL at 05:53

## 2019-03-23 RX ADMIN — Medication 100 MILLIGRAM(S): at 01:03

## 2019-03-23 RX ADMIN — MORPHINE SULFATE 2 MILLIGRAM(S): 50 CAPSULE, EXTENDED RELEASE ORAL at 20:42

## 2019-03-23 RX ADMIN — OXYCODONE AND ACETAMINOPHEN 2 TABLET(S): 5; 325 TABLET ORAL at 16:55

## 2019-03-23 RX ADMIN — SENNA PLUS 2 TABLET(S): 8.6 TABLET ORAL at 01:01

## 2019-03-23 RX ADMIN — CEFEPIME 100 MILLIGRAM(S): 1 INJECTION, POWDER, FOR SOLUTION INTRAMUSCULAR; INTRAVENOUS at 13:43

## 2019-03-23 RX ADMIN — OXYCODONE AND ACETAMINOPHEN 2 TABLET(S): 5; 325 TABLET ORAL at 10:43

## 2019-03-23 RX ADMIN — Medication 100 MILLIGRAM(S): at 13:43

## 2019-03-23 RX ADMIN — CEFEPIME 100 MILLIGRAM(S): 1 INJECTION, POWDER, FOR SOLUTION INTRAMUSCULAR; INTRAVENOUS at 05:50

## 2019-03-23 RX ADMIN — Medication 5 MILLIGRAM(S): at 05:52

## 2019-03-23 RX ADMIN — Medication 5 MILLIGRAM(S): at 13:43

## 2019-03-23 RX ADMIN — SENNA PLUS 2 TABLET(S): 8.6 TABLET ORAL at 21:42

## 2019-03-23 RX ADMIN — CEFEPIME 100 MILLIGRAM(S): 1 INJECTION, POWDER, FOR SOLUTION INTRAMUSCULAR; INTRAVENOUS at 21:40

## 2019-03-23 RX ADMIN — HEPARIN SODIUM 5000 UNIT(S): 5000 INJECTION INTRAVENOUS; SUBCUTANEOUS at 13:43

## 2019-03-23 RX ADMIN — Medication 100 MILLIGRAM(S): at 21:41

## 2019-03-23 RX ADMIN — PREGABALIN 1000 MICROGRAM(S): 225 CAPSULE ORAL at 11:06

## 2019-03-23 RX ADMIN — Medication 250 MILLIGRAM(S): at 17:14

## 2019-03-23 RX ADMIN — Medication 5 MILLIGRAM(S): at 01:02

## 2019-03-23 RX ADMIN — CEFEPIME 100 MILLIGRAM(S): 1 INJECTION, POWDER, FOR SOLUTION INTRAMUSCULAR; INTRAVENOUS at 01:03

## 2019-03-23 RX ADMIN — Medication 5 MILLIGRAM(S): at 21:41

## 2019-03-23 RX ADMIN — OXYCODONE AND ACETAMINOPHEN 2 TABLET(S): 5; 325 TABLET ORAL at 05:52

## 2019-03-23 RX ADMIN — Medication 250 MILLIGRAM(S): at 05:50

## 2019-03-23 NOTE — PROGRESS NOTE ADULT - SUBJECTIVE AND OBJECTIVE BOX
POD # 1    S/P Lumbar wound washout    pt seen and examined at bedside pt is alert states she feels much better       Vital Signs Last 24 Hrs  T(C): 37.4 (23 Mar 2019 07:42), Max: 38.9 (23 Mar 2019 05:21)  T(F): 99.3 (23 Mar 2019 07:42), Max: 102.1 (23 Mar 2019 05:21)  HR: 87 (23 Mar 2019 07:42) (70 - 98)  BP: 102/50 (23 Mar 2019 07:42) (81/40 - 145/55)  BP(mean): --  RR: 19 (23 Mar 2019 07:42) (10 - 20)  SpO2: 98% (22 Mar 2019 23:00) (92% - 100%)    PHYSICAL EXAM:    A&OX3, PERRLA   MAEX4   MS bilateral LE's 5/5        bilateral  UE's 5/5   incision clean dry intact     drain 10 cc       MEDICATIONS:  Antibiotics:  cefepime   IVPB 1000 milliGRAM(s) IV Intermittent every 8 hours  vancomycin  IVPB 1000 milliGRAM(s) IV Intermittent every 12 hours    Neuro:  acetaminophen   Tablet .. 650 milliGRAM(s) Oral every 6 hours PRN  diazepam    Tablet 5 milliGRAM(s) Oral every 8 hours  morphine  - Injectable 2 milliGRAM(s) IV Push every 4 hours PRN  ondansetron Injectable 4 milliGRAM(s) IV Push every 6 hours PRN  oxyCODONE    5 mG/acetaminophen 325 mG 2 Tablet(s) Oral every 4 hours PRN  oxyCODONE    5 mG/acetaminophen 325 mG 1 Tablet(s) Oral every 4 hours PRN    Anticoagulation:  heparin  Injectable 5000 Unit(s) SubCutaneous every 8 hours    OTHER:  docusate sodium 100 milliGRAM(s) Oral three times a day  labetalol 100 milliGRAM(s) Oral daily  pantoprazole    Tablet 40 milliGRAM(s) Oral before breakfast  senna 2 Tablet(s) Oral at bedtime    IVF:  cyanocobalamin 1000 MICROGram(s) Oral daily  dextrose 5% + sodium chloride 0.45%. 1000 milliLiter(s) IV Continuous <Continuous>  multivitamin 1 Tablet(s) Oral daily        LABS:                        11.6   7.05  )-----------( 200      ( 23 Mar 2019 08:01 )             35.0     03-23    138  |  103  |  10  ----------------------------<  154<H>  3.3<L>   |  23  |  0.5<L>    Ca    8.2<L>      23 Mar 2019 08:01  Phos  2.5     03-23  Mg     1.5     03-23    TPro  5.6<L>  /  Alb  3.3<L>  /  TBili  0.3  /  DBili  x   /  AST  20  /  ALT  20  /  AlkPhos  71  03-23    PT/INR - ( 23 Mar 2019 08:01 )   PT: 13.30 sec;   INR: 1.16 ratio         PTT - ( 23 Mar 2019 08:01 )  PTT:28.6 sec    A/p       S/p Lumbar wound washout             continue antibiotics             f/u cultures             F/U ID consult             monitor drain output             start sq heparin

## 2019-03-24 LAB
-  COAGULASE NEGATIVE STAPHYLOCOCCUS: SIGNIFICANT CHANGE UP
ANION GAP SERPL CALC-SCNC: 12 MMOL/L — SIGNIFICANT CHANGE UP (ref 7–14)
BUN SERPL-MCNC: 4 MG/DL — LOW (ref 10–20)
CALCIUM SERPL-MCNC: 8 MG/DL — LOW (ref 8.5–10.1)
CHLORIDE SERPL-SCNC: 100 MMOL/L — SIGNIFICANT CHANGE UP (ref 98–110)
CO2 SERPL-SCNC: 27 MMOL/L — SIGNIFICANT CHANGE UP (ref 17–32)
CREAT SERPL-MCNC: 0.5 MG/DL — LOW (ref 0.7–1.5)
CRP SERPL-MCNC: 9.56 MG/DL — HIGH (ref 0–0.4)
GLUCOSE SERPL-MCNC: 145 MG/DL — HIGH (ref 70–99)
GRAM STN FLD: SIGNIFICANT CHANGE UP
HCT VFR BLD CALC: 33.2 % — LOW (ref 37–47)
HGB BLD-MCNC: 11.2 G/DL — LOW (ref 12–16)
MCHC RBC-ENTMCNC: 29.9 PG — SIGNIFICANT CHANGE UP (ref 27–31)
MCHC RBC-ENTMCNC: 33.7 G/DL — SIGNIFICANT CHANGE UP (ref 32–37)
MCV RBC AUTO: 88.5 FL — SIGNIFICANT CHANGE UP (ref 81–99)
METHOD TYPE: SIGNIFICANT CHANGE UP
NRBC # BLD: 0 /100 WBCS — SIGNIFICANT CHANGE UP (ref 0–0)
PLATELET # BLD AUTO: 212 K/UL — SIGNIFICANT CHANGE UP (ref 130–400)
POTASSIUM SERPL-MCNC: 2.8 MMOL/L — LOW (ref 3.5–5)
POTASSIUM SERPL-SCNC: 2.8 MMOL/L — LOW (ref 3.5–5)
RBC # BLD: 3.75 M/UL — LOW (ref 4.2–5.4)
RBC # FLD: 12.3 % — SIGNIFICANT CHANGE UP (ref 11.5–14.5)
SODIUM SERPL-SCNC: 139 MMOL/L — SIGNIFICANT CHANGE UP (ref 135–146)
SPECIMEN SOURCE: SIGNIFICANT CHANGE UP
WBC # BLD: 6.42 K/UL — SIGNIFICANT CHANGE UP (ref 4.8–10.8)
WBC # FLD AUTO: 6.42 K/UL — SIGNIFICANT CHANGE UP (ref 4.8–10.8)

## 2019-03-24 RX ORDER — KETOROLAC TROMETHAMINE 30 MG/ML
15 SYRINGE (ML) INJECTION EVERY 6 HOURS
Qty: 0 | Refills: 0 | Status: DISCONTINUED | OUTPATIENT
Start: 2019-03-24 | End: 2019-03-26

## 2019-03-24 RX ORDER — POTASSIUM CHLORIDE 20 MEQ
20 PACKET (EA) ORAL ONCE
Qty: 0 | Refills: 0 | Status: COMPLETED | OUTPATIENT
Start: 2019-03-24 | End: 2019-03-24

## 2019-03-24 RX ADMIN — CEFEPIME 100 MILLIGRAM(S): 1 INJECTION, POWDER, FOR SOLUTION INTRAMUSCULAR; INTRAVENOUS at 22:09

## 2019-03-24 RX ADMIN — Medication 1 TABLET(S): at 11:40

## 2019-03-24 RX ADMIN — Medication 15 MILLIGRAM(S): at 18:24

## 2019-03-24 RX ADMIN — MORPHINE SULFATE 2 MILLIGRAM(S): 50 CAPSULE, EXTENDED RELEASE ORAL at 01:01

## 2019-03-24 RX ADMIN — MORPHINE SULFATE 2 MILLIGRAM(S): 50 CAPSULE, EXTENDED RELEASE ORAL at 01:16

## 2019-03-24 RX ADMIN — PREGABALIN 1000 MICROGRAM(S): 225 CAPSULE ORAL at 11:40

## 2019-03-24 RX ADMIN — Medication 5 MILLIGRAM(S): at 13:46

## 2019-03-24 RX ADMIN — Medication 5 MILLIGRAM(S): at 22:15

## 2019-03-24 RX ADMIN — PANTOPRAZOLE SODIUM 40 MILLIGRAM(S): 20 TABLET, DELAYED RELEASE ORAL at 06:29

## 2019-03-24 RX ADMIN — SENNA PLUS 2 TABLET(S): 8.6 TABLET ORAL at 22:12

## 2019-03-24 RX ADMIN — Medication 250 MILLIGRAM(S): at 17:08

## 2019-03-24 RX ADMIN — HEPARIN SODIUM 5000 UNIT(S): 5000 INJECTION INTRAVENOUS; SUBCUTANEOUS at 06:29

## 2019-03-24 RX ADMIN — CEFEPIME 100 MILLIGRAM(S): 1 INJECTION, POWDER, FOR SOLUTION INTRAMUSCULAR; INTRAVENOUS at 05:46

## 2019-03-24 RX ADMIN — Medication 100 MILLIGRAM(S): at 22:12

## 2019-03-24 RX ADMIN — MORPHINE SULFATE 2 MILLIGRAM(S): 50 CAPSULE, EXTENDED RELEASE ORAL at 16:45

## 2019-03-24 RX ADMIN — MORPHINE SULFATE 2 MILLIGRAM(S): 50 CAPSULE, EXTENDED RELEASE ORAL at 13:45

## 2019-03-24 RX ADMIN — Medication 20 MILLIEQUIVALENT(S): at 22:16

## 2019-03-24 RX ADMIN — Medication 5 MILLIGRAM(S): at 06:29

## 2019-03-24 RX ADMIN — OXYCODONE AND ACETAMINOPHEN 2 TABLET(S): 5; 325 TABLET ORAL at 03:15

## 2019-03-24 RX ADMIN — OXYCODONE AND ACETAMINOPHEN 1 TABLET(S): 5; 325 TABLET ORAL at 10:32

## 2019-03-24 RX ADMIN — OXYCODONE AND ACETAMINOPHEN 2 TABLET(S): 5; 325 TABLET ORAL at 02:16

## 2019-03-24 RX ADMIN — CEFEPIME 100 MILLIGRAM(S): 1 INJECTION, POWDER, FOR SOLUTION INTRAMUSCULAR; INTRAVENOUS at 14:09

## 2019-03-24 RX ADMIN — Medication 250 MILLIGRAM(S): at 06:29

## 2019-03-24 RX ADMIN — HEPARIN SODIUM 5000 UNIT(S): 5000 INJECTION INTRAVENOUS; SUBCUTANEOUS at 22:14

## 2019-03-24 RX ADMIN — Medication 100 MILLIGRAM(S): at 06:29

## 2019-03-24 RX ADMIN — HEPARIN SODIUM 5000 UNIT(S): 5000 INJECTION INTRAVENOUS; SUBCUTANEOUS at 13:46

## 2019-03-24 RX ADMIN — Medication 100 MILLIGRAM(S): at 08:43

## 2019-03-24 RX ADMIN — Medication 100 MILLIGRAM(S): at 13:46

## 2019-03-24 RX ADMIN — SODIUM CHLORIDE 75 MILLILITER(S): 9 INJECTION, SOLUTION INTRAVENOUS at 14:10

## 2019-03-24 NOTE — CONSULT NOTE ADULT - SUBJECTIVE AND OBJECTIVE BOX
DANIEL MCCLURE  48y, Female  Allergy: No Known Allergies      HPI:  48 year old female with hx of L4- S1 laminectomy in feb 2019,. pt did well postop .  Last Tuesday pt went to Dr Christensen for drainage of incision and they cleaned it out pt felt better over the past few days she has felt worse , pt with subjective fevers . A lot of pain in her LB ., at present time pt does not report any radicular pain . Incision site was cleaned by Neurosurgery PA , no drainage at present time noted , small pin hole opening at the bottom of incision . (22 Mar 2019 08:45)    FAMILY HISTORY:    PAST MEDICAL & SURGICAL HISTORY:  Herniated disc, cervical  Hypertension  H/O lumbar discectomy      ROS negative except as per HPI    VITALS:  T(F): 99.5, Max: 100.1 (03-23-19 @ 22:31)  HR: 86  BP: 110/56  RR: 18Vital Signs Last 24 Hrs  T(C): 37.5 (24 Mar 2019 05:25), Max: 37.8 (23 Mar 2019 12:47)  T(F): 99.5 (24 Mar 2019 05:25), Max: 100.1 (23 Mar 2019 22:31)  HR: 86 (24 Mar 2019 08:36) (86 - 95)  BP: 110/56 (24 Mar 2019 10:27) (97/52 - 135/68)  BP(mean): --  RR: 18 (24 Mar 2019 05:25) (18 - 19)  SpO2: --    PHYSICAL EXAM:  Gen: Awake and alert, non-toxic appearing, NAD  HEENT: NCAT. EOMI. MMM.   Neck: Supple, no cervical LAD  CV: RRR, no murmurs  Lungs: CTAB, no w/r/r  Abd: Soft. NTND  Extr: wwp, no edema  Back dressings lumbar drain with SS fluid  Skin: no rash  Neuro: No focal deficits  Lines: clean      TESTS & MEASUREMENTS:                        11.6   7.05  )-----------( 200      ( 23 Mar 2019 08:01 )             35.0     03-23    138  |  103  |  10  ----------------------------<  154<H>  3.3<L>   |  23  |  0.5<L>    Ca    8.2<L>      23 Mar 2019 08:01  Phos  2.5     03-23  Mg     1.5     03-23    TPro  5.6<L>  /  Alb  3.3<L>  /  TBili  0.3  /  DBili  x   /  AST  20  /  ALT  20  /  AlkPhos  71  03-23    LIVER FUNCTIONS - ( 23 Mar 2019 08:01 )  Alb: 3.3 g/dL / Pro: 5.6 g/dL / ALK PHOS: 71 U/L / ALT: 20 U/L / AST: 20 U/L / GGT: x             Culture - Blood (collected 03-22-19 @ 07:52)  Source: .Blood Blood  Gram Stain (03-24-19 @ 04:31):    Growth in anaerobic bottle: Gram Positive Cocci in Clusters  Preliminary Report (03-24-19 @ 04:31):    Growth in anaerobic bottle: Gram Positive Cocci in Clusters    "Due to technical problems, Proteus sp. will Not be reported as part of    the BCID panel until further notice"    ***Blood Panel PCR results on this specimen are available    approximately 3 hours after the Gram stain result.***    Gram stain, PCR, and/or culture results may not always    correspond due to difference in methodologies.    ************************************************************    This PCR assay was performed using [employer].    The following targets are tested for: Enterococcus,    vancomycin resistant enterococci, Listeria monocytogenes,    coagulase negative staphylococci, S. aureus,    methicillin resistant S. aureus, Streptococcus agalactiae    (Group B), S. pneumoniae, S. pyogenes (Group A),    Acinetobacter baumannii, Enterobacter cloacae, E. coli,    Klebsiella oxytoca, K. pneumoniae, Proteus sp.,    Serratia marcescens, Haemophilus influenzae,    Neisseria meningitidis, Pseudomonas aeruginosa, Candida    albicans, C. glabrata, C krusei, C parapsilosis,    C. tropicalis and the KPC resistance gene.  Organism: Blood Culture PCR (03-24-19 @ 06:39)  Organism: Blood Culture PCR (03-24-19 @ 06:39)      -  Coagulase negative Staphylococcus: Detec      Method Type: PCR    Culture - Blood (collected 03-22-19 @ 07:52)  Source: .Blood Blood  Preliminary Report (03-23-19 @ 19:01):    No growth to date.            RADIOLOGY & ADDITIONAL TESTS:    ANTIBIOTICS:  cefepime   IVPB   100 mL/Hr IV Intermittent (03-24-19 @ 05:46)   100 mL/Hr IV Intermittent (03-23-19 @ 21:40)   100 mL/Hr IV Intermittent (03-23-19 @ 13:43)   100 mL/Hr IV Intermittent (03-23-19 @ 05:50)   100 mL/Hr IV Intermittent (03-23-19 @ 01:03)    vancomycin  IVPB   250 mL/Hr IV Intermittent (03-24-19 @ 06:29)   250 mL/Hr IV Intermittent (03-23-19 @ 17:14)   250 mL/Hr IV Intermittent (03-23-19 @ 05:50)

## 2019-03-24 NOTE — PROGRESS NOTE ADULT - SUBJECTIVE AND OBJECTIVE BOX
Subjective: back pain  s/p MLD, reexploration for recurrent disc herniation wash out of wound 3.22.19  T(C): 37.5 (03-24-19 @ 05:25), Max: 37.8 (03-23-19 @ 12:47)  HR: 86 (03-24-19 @ 08:36) (86 - 95)  BP: 110/56 (03-24-19 @ 10:27) (97/52 - 135/68)  RR: 18 (03-24-19 @ 05:25) (18 - 19)  SpO2: --  Wt(kg): --    Exam: patient seen with Dr Christensen, patient moving legs well, good strength no leg pain, back pain post op, dressing dry, JV 5 cc's, 1 blood culture +  gm + cocci in clusters, d/w Dr Dias, probable contaminent rec to continue ABX, order another blood culture intra op cultures pending    CBC Full  -  ( 23 Mar 2019 08:01 )  WBC Count : 7.05 K/uL  Hemoglobin : 11.6 g/dL  Hematocrit : 35.0 %  Platelet Count - Automated : 200 K/uL  Mean Cell Volume : 90.2 fL  Mean Cell Hemoglobin : 29.9 pg  Mean Cell Hemoglobin Concentration : 33.1 g/dL  Auto Neutrophil # : 5.34 K/uL  Auto Lymphocyte # : 0.98 K/uL  Auto Monocyte # : 0.67 K/uL  Auto Eosinophil # : 0.02 K/uL  Auto Basophil # : 0.01 K/uL  Auto Neutrophil % : 75.8 %  Auto Lymphocyte % : 13.9 %  Auto Monocyte % : 9.5 %  Auto Eosinophil % : 0.3 %  Auto Basophil % : 0.1 %    03-23    138  |  103  |  10  ----------------------------<  154<H>  3.3<L>   |  23  |  0.5<L>    Ca    8.2<L>      23 Mar 2019 08:01  Phos  2.5     03-23  Mg     1.5     03-23    TPro  5.6<L>  /  Alb  3.3<L>  /  TBili  0.3  /  DBili  x   /  AST  20  /  ALT  20  /  AlkPhos  71  03-23    PT/INR - ( 23 Mar 2019 08:01 )   PT: 13.30 sec;   INR: 1.16 ratio         PTT - ( 23 Mar 2019 08:01 )  PTT:28.6 sec      Assessment possible wound infection  Plan: continue ABX, await intraop cultures, d/c drain

## 2019-03-24 NOTE — CONSULT NOTE ADULT - ASSESSMENT
48yF    Herniated disc, cervical  Hypertension  s/p 2/2019 laminectomy    Admitted with f/c POSTOPERATIVE INFECTION  Sepsis ruled out on admission  WBC 7  S/p OR 3/22  Bcx 1/2 with coNS, contaminant    - Continue cefepime 1g q8h  - Vanc 1g q12h  - Please check vanc trough 30 min prior to 4th dose  - F/u OR cx  - repeat bcx , coNS likely contam

## 2019-03-25 LAB
ANION GAP SERPL CALC-SCNC: 13 MMOL/L — SIGNIFICANT CHANGE UP (ref 7–14)
BUN SERPL-MCNC: 6 MG/DL — LOW (ref 10–20)
CALCIUM SERPL-MCNC: 8.3 MG/DL — LOW (ref 8.5–10.1)
CHLORIDE SERPL-SCNC: 103 MMOL/L — SIGNIFICANT CHANGE UP (ref 98–110)
CO2 SERPL-SCNC: 25 MMOL/L — SIGNIFICANT CHANGE UP (ref 17–32)
CREAT SERPL-MCNC: 0.5 MG/DL — LOW (ref 0.7–1.5)
GLUCOSE SERPL-MCNC: 108 MG/DL — HIGH (ref 70–99)
MAGNESIUM SERPL-MCNC: 1.6 MG/DL — LOW (ref 1.8–2.4)
POTASSIUM SERPL-MCNC: 3.4 MMOL/L — LOW (ref 3.5–5)
POTASSIUM SERPL-SCNC: 3.4 MMOL/L — LOW (ref 3.5–5)
SODIUM SERPL-SCNC: 141 MMOL/L — SIGNIFICANT CHANGE UP (ref 135–146)
VANCOMYCIN TROUGH SERPL-MCNC: 6.9 UG/ML — SIGNIFICANT CHANGE UP (ref 5–10)

## 2019-03-25 RX ORDER — POTASSIUM CHLORIDE 20 MEQ
40 PACKET (EA) ORAL ONCE
Qty: 0 | Refills: 0 | Status: COMPLETED | OUTPATIENT
Start: 2019-03-25 | End: 2019-03-25

## 2019-03-25 RX ADMIN — Medication 100 MILLIGRAM(S): at 22:09

## 2019-03-25 RX ADMIN — Medication 15 MILLIGRAM(S): at 05:56

## 2019-03-25 RX ADMIN — CEFEPIME 100 MILLIGRAM(S): 1 INJECTION, POWDER, FOR SOLUTION INTRAMUSCULAR; INTRAVENOUS at 05:47

## 2019-03-25 RX ADMIN — PANTOPRAZOLE SODIUM 40 MILLIGRAM(S): 20 TABLET, DELAYED RELEASE ORAL at 06:03

## 2019-03-25 RX ADMIN — HEPARIN SODIUM 5000 UNIT(S): 5000 INJECTION INTRAVENOUS; SUBCUTANEOUS at 05:53

## 2019-03-25 RX ADMIN — Medication 15 MILLIGRAM(S): at 00:31

## 2019-03-25 RX ADMIN — HEPARIN SODIUM 5000 UNIT(S): 5000 INJECTION INTRAVENOUS; SUBCUTANEOUS at 22:09

## 2019-03-25 RX ADMIN — MORPHINE SULFATE 2 MILLIGRAM(S): 50 CAPSULE, EXTENDED RELEASE ORAL at 09:48

## 2019-03-25 RX ADMIN — Medication 5 MILLIGRAM(S): at 22:09

## 2019-03-25 RX ADMIN — SENNA PLUS 2 TABLET(S): 8.6 TABLET ORAL at 22:09

## 2019-03-25 RX ADMIN — Medication 5 MILLIGRAM(S): at 14:19

## 2019-03-25 RX ADMIN — Medication 250 MILLIGRAM(S): at 05:51

## 2019-03-25 RX ADMIN — Medication 40 MILLIEQUIVALENT(S): at 13:36

## 2019-03-25 RX ADMIN — Medication 15 MILLIGRAM(S): at 17:52

## 2019-03-25 RX ADMIN — Medication 250 MILLIGRAM(S): at 22:08

## 2019-03-25 RX ADMIN — Medication 15 MILLIGRAM(S): at 23:03

## 2019-03-25 RX ADMIN — HEPARIN SODIUM 5000 UNIT(S): 5000 INJECTION INTRAVENOUS; SUBCUTANEOUS at 13:37

## 2019-03-25 RX ADMIN — Medication 100 MILLIGRAM(S): at 05:53

## 2019-03-25 RX ADMIN — MORPHINE SULFATE 2 MILLIGRAM(S): 50 CAPSULE, EXTENDED RELEASE ORAL at 14:23

## 2019-03-25 RX ADMIN — MORPHINE SULFATE 2 MILLIGRAM(S): 50 CAPSULE, EXTENDED RELEASE ORAL at 20:27

## 2019-03-25 RX ADMIN — Medication 15 MILLIGRAM(S): at 11:53

## 2019-03-25 RX ADMIN — Medication 1 TABLET(S): at 11:55

## 2019-03-25 RX ADMIN — Medication 100 MILLIGRAM(S): at 05:51

## 2019-03-25 RX ADMIN — PREGABALIN 1000 MICROGRAM(S): 225 CAPSULE ORAL at 11:54

## 2019-03-25 RX ADMIN — Medication 5 MILLIGRAM(S): at 05:51

## 2019-03-25 RX ADMIN — Medication 100 MILLIGRAM(S): at 13:36

## 2019-03-25 NOTE — PHYSICAL THERAPY INITIAL EVALUATION ADULT - GENERAL OBSERVATIONS, REHAB EVAL
[id number] pm. patient received laying on her RT side across the bottom of the bed. reports feeling stiff, not able to move/change position due to pain. patient is agreeable to try moving with assistance.  present t/o session. GOMEZ VIDALES stopped by during session.

## 2019-03-25 NOTE — CONSULT NOTE ADULT - ASSESSMENT
IMPRESSION: Rehab of gait dysfunction      PRECAUTIONS: [  ] Cardiac  [  ] Respiratory  [  ] Seizures [  ] Contact Isolation  [  ] Droplet Isolation  [  ] Other    Weight Bearing Status:     RECOMMENDATION:    Out of Bed to Chair     DVT/Decubiti Prophylaxis    REHAB PLAN:     [x   ] Bedside P/T 3-5 times a week   [   ]   Bedside O/T  2-3 times a week             [   ] No Rehab Therapy Indicated                   [   ]  Speech Therapy   Conditioning/ROM                                    ADL  Bed Mobility                                               Conditioning/ROM  Transfers                                                     Bed Mobility  Sitting /Standing Balance                         Transfers                                        Gait Training                                               Sitting/Standing Balance  Stair Training [   ]Applicable                    Home equipment Eval                                                                        Splinting  [   ] Only      GOALS:   ADL   [   ]   Independent                    Transfers  [ x  ] Independent                          Ambulation  [  x ] Independent     [  x  ] With device                            [   ]  CG                                                         [   ]  CG                                                                  [   ] CG                            [    ] Min A                                                   [   ] Min A                                                              [   ] Min  A          DISCHARGE PLAN:   [   ]  Good candidate for Intensive Rehabilitation/Hospital based                                             Will tolerate 3hrs Intensive Rehab Daily                                       [ x   ]  Short Term Rehab in Skilled Nursing Facility                      vs                 [ x   ]  Home with Outpatient or VN services                                         [    ]  Possible Candidate for Intensive Hospital based Rehab

## 2019-03-25 NOTE — PROGRESS NOTE ADULT - SUBJECTIVE AND OBJECTIVE BOX
POD # 3    S/P Lumbar Wound Washout    Pt seen and examined at bedside. Pt c/o incisional pain at this time. Denies lower extremity radiculopathy.    Vital Signs Last 24 Hrs  T(C): 36.1 (25 Mar 2019 14:08), Max: 36.7 (24 Mar 2019 21:51)  T(F): 97 (25 Mar 2019 14:08), Max: 98 (24 Mar 2019 21:51)  HR: 86 (25 Mar 2019 14:08) (73 - 100)  BP: 112/57 (25 Mar 2019 14:08) (112/57 - 131/70)  BP(mean): --  RR: 18 (25 Mar 2019 14:08) (16 - 18)  SpO2: --    PHYSICAL EXAM:  Strength 5/5  Sensation intact to light touch  Dressing intact    MEDICATIONS:  Antibiotics:  vancomycin  IVPB 1000 milliGRAM(s) IV Intermittent every 12 hours    Neuro:  acetaminophen   Tablet .. 650 milliGRAM(s) Oral every 6 hours PRN  diazepam    Tablet 5 milliGRAM(s) Oral every 8 hours  ketorolac   Injectable 15 milliGRAM(s) IV Push every 6 hours  morphine  - Injectable 2 milliGRAM(s) IV Push every 4 hours PRN  ondansetron Injectable 4 milliGRAM(s) IV Push every 6 hours PRN  oxyCODONE    5 mG/acetaminophen 325 mG 2 Tablet(s) Oral every 4 hours PRN  oxyCODONE    5 mG/acetaminophen 325 mG 1 Tablet(s) Oral every 4 hours PRN    Anticoagulation:  heparin  Injectable 5000 Unit(s) SubCutaneous every 8 hours    OTHER:  docusate sodium 100 milliGRAM(s) Oral three times a day  labetalol 100 milliGRAM(s) Oral daily  pantoprazole    Tablet 40 milliGRAM(s) Oral before breakfast  senna 2 Tablet(s) Oral at bedtime    IVF:  cyanocobalamin 1000 MICROGram(s) Oral daily  dextrose 5% + sodium chloride 0.45%. 1000 milliLiter(s) IV Continuous <Continuous>  multivitamin 1 Tablet(s) Oral daily    LABS:                        11.2   6.42  )-----------( 212      ( 24 Mar 2019 18:02 )             33.2     03-24    139  |  100  |  4<L>  ----------------------------<  145<H>  2.8<L>   |  27  |  0.5<L>    Ca    8.0<L>      24 Mar 2019 20:55    Assessment:  As above    Plan:  Appreciate ID Follow up  Continue Vanco  Vanco trough  F/U labs  K  Pain Meds PRN  Awaiting final cultures

## 2019-03-25 NOTE — PROGRESS NOTE ADULT - SUBJECTIVE AND OBJECTIVE BOX
DANIEL MCCLURE  48y, Female      OVERNIGHT EVENTS:    none    VITALS:  T(F): 96.5, Max: 99.4 (03-24-19 @ 13:58)  HR: 73  BP: 131/70  RR: 18Vital Signs Last 24 Hrs  T(C): 35.8 (25 Mar 2019 05:21), Max: 37.4 (24 Mar 2019 13:58)  T(F): 96.5 (25 Mar 2019 05:21), Max: 99.4 (24 Mar 2019 13:58)  HR: 73 (25 Mar 2019 05:21) (73 - 100)  BP: 131/70 (25 Mar 2019 05:21) (110/56 - 134/60)  BP(mean): --  RR: 18 (25 Mar 2019 05:21) (16 - 20)  SpO2: --    TESTS & MEASUREMENTS:                        11.2   6.42  )-----------( 212      ( 24 Mar 2019 18:02 )             33.2     03-24    139  |  100  |  4<L>  ----------------------------<  145<H>  2.8<L>   |  27  |  0.5<L>    Ca    8.0<L>      24 Mar 2019 20:55          Culture - Surgical Swab (collected 03-23-19 @ 10:25)  Source: .Surgical Swab None  Preliminary Report (03-24-19 @ 18:47):    Few Staphylococcus species    Culture - Surgical Swab (collected 03-23-19 @ 10:22)  Source: .Surgical Swab None  Preliminary Report (03-24-19 @ 18:53):    Few Staphylococcus species    Culture - Surgical Swab (collected 03-23-19 @ 10:20)  Source: .Surgical Swab None  Preliminary Report (03-24-19 @ 18:39):    Few Staphylococcus species    Culture - Blood (collected 03-23-19 @ 08:01)  Source: .Blood None  Gram Stain (03-24-19 @ 22:25):    Growth in anaerobic bottle: Gram Positive Cocci in Clusters    Growth in aerobic bottle: Gram Positive Cocci in Clusters  Preliminary Report (03-24-19 @ 22:25):    Growth in anaerobic bottle: Gram Positive Cocci in Clusters    Growth in aerobic bottle: Gram Positive Cocci in Clusters    Culture - Blood (collected 03-22-19 @ 07:52)  Source: .Blood Blood  Gram Stain (03-24-19 @ 04:31):    Growth in anaerobic bottle: Gram Positive Cocci in Clusters  Preliminary Report (03-24-19 @ 04:31):    Growth in anaerobic bottle: Gram Positive Cocci in Clusters    "Due to technical problems, Proteus sp. will Not be reported as part of    the BCID panel until further notice"    ***Blood Panel PCR results on this specimen are available    approximately 3 hours after the Gram stain result.***    Gram stain, PCR, and/or culture results may not always    correspond due to difference in methodologies.    ************************************************************    This PCR assay was performed using [employer].    The following targets are tested for: Enterococcus,    vancomycin resistant enterococci, Listeria monocytogenes,    coagulase negative staphylococci, S. aureus,    methicillin resistant S. aureus, Streptococcus agalactiae    (Group B), S. pneumoniae, S. pyogenes (Group A),    Acinetobacter baumannii, Enterobacter cloacae, E. coli,    Klebsiella oxytoca, K. pneumoniae, Proteus sp.,    Serratia marcescens, Haemophilus influenzae,    Neisseria meningitidis, Pseudomonas aeruginosa, Candida    albicans, C. glabrata, C krusei, C parapsilosis,    C. tropicalis and the KPC resistance gene.  Organism: Blood Culture PCR (03-24-19 @ 06:39)  Organism: Blood Culture PCR (03-24-19 @ 06:39)      -  Coagulase negative Staphylococcus: Detec      Method Type: PCR    Culture - Blood (collected 03-22-19 @ 07:52)  Source: .Blood Blood  Gram Stain (03-24-19 @ 16:58):    Growth in aerobic bottle: Gram Positive Cocci in Clusters  Preliminary Report (03-24-19 @ 16:58):    Growth in aerobic bottle: Gram Positive Cocci in Clusters            RADIOLOGY & ADDITIONAL TESTS:    ANTIBIOTICS:  vancomycin  IVPB 1000 milliGRAM(s) IV Intermittent every 12 hours

## 2019-03-25 NOTE — CONSULT NOTE ADULT - SUBJECTIVE AND OBJECTIVE BOX
HPI:  48 year old female with hx of L4- S1 laminectomy in feb 2019,. pt did well postop .  Last Tuesday pt went to Dr Christensen for drainage of incision and they cleaned it out pt felt better over the past few days she has felt worse , pt with subjective fevers . A lot of pain in her LB ., at present time pt does not report any radicular pain . Incision site was cleaned by Neurosurgery PA , no drainage at present time noted , small pin hole opening at the bottom of incision . (22 Mar 2019 08:45). s/p reexploration / washout      PAST MEDICAL & SURGICAL HISTORY:  Herniated disc, cervical  Hypertension  H/O lumbar discectomy      Hospital Course:    TODAY'S SUBJECTIVE & REVIEW OF SYMPTOMS:     Constitutional WNL   Cardio WNL   Resp WNL   GI WNL  Heme WNL  Endo WNL  Skin WNL  MSK pain  Neuro WNL  Cognitive WNL  Psych WNL      MEDICATIONS  (STANDING):  cyanocobalamin 1000 MICROGram(s) Oral daily  dextrose 5% + sodium chloride 0.45%. 1000 milliLiter(s) (75 mL/Hr) IV Continuous <Continuous>  diazepam    Tablet 5 milliGRAM(s) Oral every 8 hours  docusate sodium 100 milliGRAM(s) Oral three times a day  heparin  Injectable 5000 Unit(s) SubCutaneous every 8 hours  ketorolac   Injectable 15 milliGRAM(s) IV Push every 6 hours  labetalol 100 milliGRAM(s) Oral daily  multivitamin 1 Tablet(s) Oral daily  pantoprazole    Tablet 40 milliGRAM(s) Oral before breakfast  potassium chloride    Tablet ER 40 milliEquivalent(s) Oral once  senna 2 Tablet(s) Oral at bedtime  vancomycin  IVPB 1000 milliGRAM(s) IV Intermittent every 12 hours    MEDICATIONS  (PRN):  acetaminophen   Tablet .. 650 milliGRAM(s) Oral every 6 hours PRN Temp greater or equal to 38C (100.4F)  morphine  - Injectable 2 milliGRAM(s) IV Push every 4 hours PRN Severe Pain (7 - 10)  ondansetron Injectable 4 milliGRAM(s) IV Push every 6 hours PRN Nausea and/or Vomiting  oxyCODONE    5 mG/acetaminophen 325 mG 2 Tablet(s) Oral every 4 hours PRN Moderate Pain (4 - 6)  oxyCODONE    5 mG/acetaminophen 325 mG 1 Tablet(s) Oral every 4 hours PRN Mild Pain (1 - 3)      FAMILY HISTORY:      Allergies    No Known Allergies    Intolerances        SOCIAL HISTORY:    [  ] Etoh  [  ] Smoking  [  ] Substance abuse     Home Environment:  [  ] Home Alone  [x  ] Lives with Family  [  ] Home Health Aid    Dwelling:  [  ] Apartment  [ x ] Private House  [  ] Adult Home  [  ] Skilled Nursing Facility      [  ] Short Term  [  ] Long Term  [ x ] Stairs       Elevator [  ]    FUNCTIONAL STATUS PTA: (Check all that apply)  Ambulation: [ x  ]Independent    [  ] Dependent     [  ] Non-Ambulatory  Assistive Device: [  ] SA Cane  [  ]  Q Cane  [ x ] Walker  [  ]  Wheelchair  ADL : [x  ] Independent  [  ]  Dependent       Vital Signs Last 24 Hrs  T(C): 35.8 (25 Mar 2019 05:21), Max: 37.4 (24 Mar 2019 13:58)  T(F): 96.5 (25 Mar 2019 05:21), Max: 99.4 (24 Mar 2019 13:58)  HR: 73 (25 Mar 2019 05:21) (73 - 100)  BP: 131/70 (25 Mar 2019 05:21) (112/57 - 131/70)  BP(mean): --  RR: 18 (25 Mar 2019 05:21) (16 - 20)  SpO2: --      PHYSICAL EXAM: Alert & Oriented X3  GENERAL: NAD, well-groomed, well-developed  HEAD:  Atraumatic, Normocephalic  CHEST/LUNG: Clear   HEART: S1S2+  ABDOMEN: Soft, Nontender  EXTREMITIES:  no calf tenderness    NERVOUS SYSTEM:  Cranial Nerves 2-12 intact [  ] Abnormal  [  ]  ROM: WFL all extremities [x  ]  Abnormal [  ]  Motor Strength: WFL all extremities  [x  ]  Abnormal [  ]  Sensation: intact to light touch [ x ] Abnormal [  ]  Reflexes: Symmetric [  ]  Abnormal [  ]    FUNCTIONAL STATUS:  Bed Mobility: Independent [  ]  Supervision [  ]  Needs Assistance [ x ]  N/A [  ]  Transfers: Independent [  ]  Supervision [  ]  Needs Assistance [x  ]  N/A [  ]   Ambulation: Independent [  ]  Supervision [  ]  Needs Assistance [  ]  N/A [  ]  ADL: Independent [  ] Requires Assistance [  ] N/A [  ]      LABS:                        11.2   6.42  )-----------( 212      ( 24 Mar 2019 18:02 )             33.2     03-24    139  |  100  |  4<L>  ----------------------------<  145<H>  2.8<L>   |  27  |  0.5<L>    Ca    8.0<L>      24 Mar 2019 20:55            RADIOLOGY & ADDITIONAL STUDIES:    Assesment:

## 2019-03-25 NOTE — PROGRESS NOTE ADULT - ASSESSMENT
· Assessment		  48yF    Herniated disc, cervical  Hypertension  s/p 2/2019 laminectomy    Admitted with f/c POSTOPERATIVE INFECTION with incisional subcutaneous abscess  Sepsis ruled out on admission  WBC 6.4  S/p OR 3/22  Bcx 2/2 with coNS,   Bcx 3/22 positive  WCx Staph species    - Vanc 1g q12h  - Please check vanc trough 30 min prior to 4th dose  -d/c cefepime  - f/u final cultures

## 2019-03-26 ENCOUNTER — APPOINTMENT (OUTPATIENT)
Dept: NEUROSURGERY | Facility: CLINIC | Age: 49
End: 2019-03-26

## 2019-03-26 LAB
-  AMOXICILLIN/CLAVULANIC ACID: SIGNIFICANT CHANGE UP
-  AMPICILLIN/SULBACTAM: SIGNIFICANT CHANGE UP
-  AMPICILLIN/SULBACTAM: SIGNIFICANT CHANGE UP
-  AMPICILLIN: SIGNIFICANT CHANGE UP
-  CEFAZOLIN: SIGNIFICANT CHANGE UP
-  CEFAZOLIN: SIGNIFICANT CHANGE UP
-  CEFTRIAXONE: SIGNIFICANT CHANGE UP
-  CIPROFLOXACIN: SIGNIFICANT CHANGE UP
-  CLINDAMYCIN: SIGNIFICANT CHANGE UP
-  CLINDAMYCIN: SIGNIFICANT CHANGE UP
-  DAPTOMYCIN: SIGNIFICANT CHANGE UP
-  ERYTHROMYCIN: SIGNIFICANT CHANGE UP
-  ERYTHROMYCIN: SIGNIFICANT CHANGE UP
-  GENTAMICIN: SIGNIFICANT CHANGE UP
-  GENTAMICIN: SIGNIFICANT CHANGE UP
-  LEVOFLOXACIN: SIGNIFICANT CHANGE UP
-  LINEZOLID: SIGNIFICANT CHANGE UP
-  MEROPENEM: SIGNIFICANT CHANGE UP
-  MOXIFLOXACIN(AEROBIC): SIGNIFICANT CHANGE UP
-  OXACILLIN: SIGNIFICANT CHANGE UP
-  OXACILLIN: SIGNIFICANT CHANGE UP
-  PENICILLIN: SIGNIFICANT CHANGE UP
-  PENICILLIN: SIGNIFICANT CHANGE UP
-  RIFAMPIN: SIGNIFICANT CHANGE UP
-  RIFAMPIN: SIGNIFICANT CHANGE UP
-  TETRACYCLINE: SIGNIFICANT CHANGE UP
-  TETRACYCLINE: SIGNIFICANT CHANGE UP
-  TRIMETHOPRIM/SULFAMETHOXAZOLE: SIGNIFICANT CHANGE UP
-  TRIMETHOPRIM/SULFAMETHOXAZOLE: SIGNIFICANT CHANGE UP
-  VANCOMYCIN: SIGNIFICANT CHANGE UP
-  VANCOMYCIN: SIGNIFICANT CHANGE UP
ANION GAP SERPL CALC-SCNC: 13 MMOL/L — SIGNIFICANT CHANGE UP (ref 7–14)
BUN SERPL-MCNC: 7 MG/DL — LOW (ref 10–20)
CALCIUM SERPL-MCNC: 8.5 MG/DL — SIGNIFICANT CHANGE UP (ref 8.5–10.1)
CHLORIDE SERPL-SCNC: 104 MMOL/L — SIGNIFICANT CHANGE UP (ref 98–110)
CK SERPL-CCNC: 118 U/L — SIGNIFICANT CHANGE UP (ref 0–225)
CO2 SERPL-SCNC: 26 MMOL/L — SIGNIFICANT CHANGE UP (ref 17–32)
CREAT SERPL-MCNC: 0.5 MG/DL — LOW (ref 0.7–1.5)
CULTURE RESULTS: SIGNIFICANT CHANGE UP
GLUCOSE SERPL-MCNC: 100 MG/DL — HIGH (ref 70–99)
GRAM STN FLD: SIGNIFICANT CHANGE UP
HCT VFR BLD CALC: 32.9 % — LOW (ref 37–47)
HGB BLD-MCNC: 11 G/DL — LOW (ref 12–16)
LDH SERPL L TO P-CCNC: 199 — SIGNIFICANT CHANGE UP (ref 50–242)
MAGNESIUM SERPL-MCNC: 1.8 MG/DL — SIGNIFICANT CHANGE UP (ref 1.8–2.4)
MCHC RBC-ENTMCNC: 29.6 PG — SIGNIFICANT CHANGE UP (ref 27–31)
MCHC RBC-ENTMCNC: 33.4 G/DL — SIGNIFICANT CHANGE UP (ref 32–37)
MCV RBC AUTO: 88.4 FL — SIGNIFICANT CHANGE UP (ref 81–99)
METHOD TYPE: SIGNIFICANT CHANGE UP
METHOD TYPE: SIGNIFICANT CHANGE UP
NRBC # BLD: 0 /100 WBCS — SIGNIFICANT CHANGE UP (ref 0–0)
ORGANISM # SPEC MICROSCOPIC CNT: SIGNIFICANT CHANGE UP
PHOSPHATE SERPL-MCNC: 3.3 MG/DL — SIGNIFICANT CHANGE UP (ref 2.1–4.9)
PLATELET # BLD AUTO: 241 K/UL — SIGNIFICANT CHANGE UP (ref 130–400)
POTASSIUM SERPL-MCNC: 3.4 MMOL/L — LOW (ref 3.5–5)
POTASSIUM SERPL-SCNC: 3.4 MMOL/L — LOW (ref 3.5–5)
RBC # BLD: 3.72 M/UL — LOW (ref 4.2–5.4)
RBC # FLD: 12.2 % — SIGNIFICANT CHANGE UP (ref 11.5–14.5)
SODIUM SERPL-SCNC: 143 MMOL/L — SIGNIFICANT CHANGE UP (ref 135–146)
SPECIMEN SOURCE: SIGNIFICANT CHANGE UP
WBC # BLD: 4.94 K/UL — SIGNIFICANT CHANGE UP (ref 4.8–10.8)
WBC # FLD AUTO: 4.94 K/UL — SIGNIFICANT CHANGE UP (ref 4.8–10.8)

## 2019-03-26 RX ORDER — VANCOMYCIN HCL 1 G
1250 VIAL (EA) INTRAVENOUS EVERY 8 HOURS
Qty: 0 | Refills: 0 | Status: DISCONTINUED | OUTPATIENT
Start: 2019-03-26 | End: 2019-03-28

## 2019-03-26 RX ORDER — POTASSIUM CHLORIDE 20 MEQ
20 PACKET (EA) ORAL ONCE
Qty: 0 | Refills: 0 | Status: COMPLETED | OUTPATIENT
Start: 2019-03-26 | End: 2019-03-26

## 2019-03-26 RX ORDER — POTASSIUM CHLORIDE 20 MEQ
40 PACKET (EA) ORAL ONCE
Qty: 0 | Refills: 0 | Status: COMPLETED | OUTPATIENT
Start: 2019-03-26 | End: 2019-03-26

## 2019-03-26 RX ADMIN — Medication 5 MILLIGRAM(S): at 14:59

## 2019-03-26 RX ADMIN — Medication 250 MILLIGRAM(S): at 06:24

## 2019-03-26 RX ADMIN — Medication 100 MILLIGRAM(S): at 21:53

## 2019-03-26 RX ADMIN — Medication 5 MILLIGRAM(S): at 21:52

## 2019-03-26 RX ADMIN — Medication 15 MILLIGRAM(S): at 17:38

## 2019-03-26 RX ADMIN — HEPARIN SODIUM 5000 UNIT(S): 5000 INJECTION INTRAVENOUS; SUBCUTANEOUS at 15:00

## 2019-03-26 RX ADMIN — PREGABALIN 1000 MICROGRAM(S): 225 CAPSULE ORAL at 12:54

## 2019-03-26 RX ADMIN — HEPARIN SODIUM 5000 UNIT(S): 5000 INJECTION INTRAVENOUS; SUBCUTANEOUS at 06:07

## 2019-03-26 RX ADMIN — Medication 166.67 MILLIGRAM(S): at 16:59

## 2019-03-26 RX ADMIN — PANTOPRAZOLE SODIUM 40 MILLIGRAM(S): 20 TABLET, DELAYED RELEASE ORAL at 06:13

## 2019-03-26 RX ADMIN — Medication 166.67 MILLIGRAM(S): at 21:53

## 2019-03-26 RX ADMIN — OXYCODONE AND ACETAMINOPHEN 2 TABLET(S): 5; 325 TABLET ORAL at 21:53

## 2019-03-26 RX ADMIN — Medication 40 MILLIEQUIVALENT(S): at 17:33

## 2019-03-26 RX ADMIN — Medication 15 MILLIGRAM(S): at 12:53

## 2019-03-26 RX ADMIN — HEPARIN SODIUM 5000 UNIT(S): 5000 INJECTION INTRAVENOUS; SUBCUTANEOUS at 21:53

## 2019-03-26 RX ADMIN — SENNA PLUS 2 TABLET(S): 8.6 TABLET ORAL at 21:52

## 2019-03-26 RX ADMIN — OXYCODONE AND ACETAMINOPHEN 2 TABLET(S): 5; 325 TABLET ORAL at 09:08

## 2019-03-26 RX ADMIN — Medication 15 MILLIGRAM(S): at 06:07

## 2019-03-26 RX ADMIN — Medication 100 MILLIGRAM(S): at 06:07

## 2019-03-26 RX ADMIN — Medication 5 MILLIGRAM(S): at 06:07

## 2019-03-26 RX ADMIN — Medication 100 MILLIGRAM(S): at 15:00

## 2019-03-26 RX ADMIN — Medication 1 TABLET(S): at 12:53

## 2019-03-26 NOTE — PROGRESS NOTE ADULT - ASSESSMENT
48yF    Herniated disc, cervical  Hypertension  s/p 2/2019 laminectomy    Admitted with f/c POSTOPERATIVE INFECTION with incisional subcutaneous abscess  Sepsis ruled out on admission  WBC 6.4  S/p OR 3/22  Bcx 3/22 coNS  Bcx 3/23 coNS  Bcx 3/24 coNS  WCx coNS    - INCREASE to Vanc 1.25g q8h  - Add on ESR/CRP to AM labs  - Please recheck vanc trough 30 min prior to 4th dose  - Consider MRI as continued + blood cultures  - TTE  - Daily bcx until clears 48h

## 2019-03-26 NOTE — PROGRESS NOTE ADULT - SUBJECTIVE AND OBJECTIVE BOX
Subjective: 48yFemale with a pmhx of POSTOPERATIVE INFECTION  ^S/P SURGERY PAIN/INFECTION  Handoff  MEWS Score  Herniated disc, cervical  Hypertension  No pertinent past medical history  Postoperative infection, unspecified type, initial encounter  Postoperative infection, unspecified type, initial encounter  Incision and drainage, wound, lumbar  Complex incision and drainage of wound infection  H/O lumbar discectomy  S/P SURGERY PAIN/INFECTION  20    POD # 4    S/P Lumbar Wound Washout    Pt seen and examined at bedside. Pt c/o incisional pain at this time. Denies lower extremity radiculopathy. Complaining of pain to the left hip       Allergies    No Known Allergies    Intolerances        Vital Signs Last 24 Hrs  T(C): 36.1 (26 Mar 2019 05:00), Max: 37.5 (25 Mar 2019 22:32)  T(F): 96.9 (26 Mar 2019 05:00), Max: 99.5 (25 Mar 2019 22:32)  HR: 71 (26 Mar 2019 05:00) (71 - 94)  BP: 124/61 (26 Mar 2019 05:00) (112/57 - 125/59)  BP(mean): --  RR: 18 (26 Mar 2019 05:00) (18 - 18)  SpO2: --      acetaminophen   Tablet .. 650 milliGRAM(s) Oral every 6 hours PRN  cyanocobalamin 1000 MICROGram(s) Oral daily  dextrose 5% + sodium chloride 0.45%. 1000 milliLiter(s) IV Continuous <Continuous>  diazepam    Tablet 5 milliGRAM(s) Oral every 8 hours  docusate sodium 100 milliGRAM(s) Oral three times a day  heparin  Injectable 5000 Unit(s) SubCutaneous every 8 hours  ketorolac   Injectable 15 milliGRAM(s) IV Push every 6 hours  labetalol 100 milliGRAM(s) Oral daily  morphine  - Injectable 2 milliGRAM(s) IV Push every 4 hours PRN  multivitamin 1 Tablet(s) Oral daily  ondansetron Injectable 4 milliGRAM(s) IV Push every 6 hours PRN  oxyCODONE    5 mG/acetaminophen 325 mG 2 Tablet(s) Oral every 4 hours PRN  oxyCODONE    5 mG/acetaminophen 325 mG 1 Tablet(s) Oral every 4 hours PRN  pantoprazole    Tablet 40 milliGRAM(s) Oral before breakfast  senna 2 Tablet(s) Oral at bedtime  vancomycin  IVPB 1250 milliGRAM(s) IV Intermittent every 8 hours            PHYSICAL EXAM:  Strength 5/5  Sensation intact to light touch  Dressing intact          CBC Full  -  ( 26 Mar 2019 06:19 )  WBC Count : 4.94 K/uL  RBC Count : 3.72 M/uL  Hemoglobin : 11.0 g/dL  Hematocrit : 32.9 %  Platelet Count - Automated : 241 K/uL  Mean Cell Volume : 88.4 fL  Mean Cell Hemoglobin : 29.6 pg  Mean Cell Hemoglobin Concentration : 33.4 g/dL    03-26    143  |  104  |  7<L>  ----------------------------<  100<H>  3.4<L>   |  26  |  0.5<L>    Ca    8.5      26 Mar 2019 06:19  Phos  3.3     03-26  Mg     1.8     03-26        Lactate Dehydrogenase, Serum: 199 (03-26 @ 06:19)      Wound:  clean and dry     Imaging:  < from: MR Lumbar Spine w/wo IV Cont (03.22.19 @ 11:27) >  Impression:    1.  Status post L4-L5 and L5-S1 discectomies and left laminectomies    2.  Fluid tracking from the laminectomy defects to the subcutaneous   tissues has the appearance of a post operative seroma. Abscess is less   likely. Continued correlation follow-up    3.  Epidural enhancing soft tissue surrounding the L4 and L5 nerve roots   is likely postoperative change. Mass effect on the nerve roots is a   typical post operative appearance.    4.  Left posterior paravertebral muscle edema, probably postoperative. No   intramuscular abscess.    5.  These findings can be seen in the normal postoperative patient, given   the severity of the pain presentation, infectious or inflammatory   processes can be considered if symptoms persist        JUVENAL HOWARD M.D., ATTENDING RADIOLOGIST  This document has been electronically signed. Mar 22 2019  4:00PM    < end of copied text >      Assessment/Plan: as above  replaced potassium  MRI Left hip +/-   increased vanco to 1.25 q8h per ID after conversation about cultures growing MRSA  daily blood cultures  2d echo  labs in am  d/w attending

## 2019-03-26 NOTE — PROGRESS NOTE ADULT - SUBJECTIVE AND OBJECTIVE BOX
KAMI, DANIEL  48y, Female      OVERNIGHT EVENTS:  tm 99.5  bcx 3/24 +    ROS negative except as per above    VITALS:  T(F): 96.9, Max: 99.5 (03-25-19 @ 22:32)  HR: 71  BP: 124/61  RR: 18Vital Signs Last 24 Hrs  T(C): 36.1 (26 Mar 2019 05:00), Max: 37.5 (25 Mar 2019 22:32)  T(F): 96.9 (26 Mar 2019 05:00), Max: 99.5 (25 Mar 2019 22:32)  HR: 71 (26 Mar 2019 05:00) (71 - 94)  BP: 124/61 (26 Mar 2019 05:00) (112/57 - 125/59)  BP(mean): --  RR: 18 (26 Mar 2019 05:00) (18 - 18)  SpO2: --    PHYSICAL EXAM  Gen: Awake and alert, non-toxic appearing, NAD  HEENT: NCAT.  CV: RRR, no murmurs  Lungs: CTAB, no w/r/r  Abd: Soft. NTND  Back: dressings , TTP  Extr: wwp, no edema  Skin: no rash  Neuro: No focal deficits  Lines: clean        TESTS & MEASUREMENTS:                        11.0   4.94  )-----------( 241      ( 26 Mar 2019 06:19 )             32.9     03-26    143  |  104  |  7<L>  ----------------------------<  100<H>  3.4<L>   |  26  |  0.5<L>    Ca    8.5      26 Mar 2019 06:19  Phos  3.3     03-26  Mg     1.8     03-26          Culture - Blood (collected 03-24-19 @ 20:55)  Source: .Blood None  Gram Stain (03-26-19 @ 07:56):    Growth in anaerobic bottle: Gram Positive Cocci in Clusters  Preliminary Report (03-26-19 @ 07:56):    Growth in anaerobic bottle: Gram Positive Cocci in Clusters    Culture - Blood (collected 03-24-19 @ 18:02)  Source: .Blood None  Preliminary Report (03-26-19 @ 06:01):    No growth to date.    Culture - Surgical Swab (collected 03-23-19 @ 10:25)  Source: .Surgical Swab None  Preliminary Report (03-25-19 @ 19:30):    Few Coag Negative Staphylococcus  Organism: Coag Negative Staphylococcus (03-25-19 @ 19:30)  Organism: Coag Negative Staphylococcus (03-25-19 @ 19:30)      -  Ampicillin/Sulbactam: R <=8/4      -  Cefazolin: R <=4      -  Clindamycin: R >4      -  Erythromycin: S <=0.5      -  Gentamicin: S <=4 Should not be used as monotherapy      -  Oxacillin: R >2      -  Penicillin: R 8      -  RIF- Rifampin: S <=1 Should not be used as monotherapy      -  Tetra/Doxy: S <=4      -  Trimethoprim/Sulfamethoxazole: S <=0.5/9.5      -  Vancomycin: S 2      Method Type: CAMILLE    Culture - Surgical Swab (collected 03-23-19 @ 10:22)  Source: .Surgical Swab None  Preliminary Report (03-25-19 @ 19:28):    Few Coag Negative Staphylococcus  Organism: Coag Negative Staphylococcus (03-25-19 @ 19:28)  Organism: Coag Negative Staphylococcus (03-25-19 @ 19:28)      -  Ampicillin/Sulbactam: R <=8/4      -  Cefazolin: R <=4      -  Clindamycin: R >4      -  Erythromycin: S <=0.5      -  Gentamicin: S <=4 Should not be used as monotherapy      -  Oxacillin: R >2      -  Penicillin: R 8      -  RIF- Rifampin: S <=1 Should not be used as monotherapy      -  Tetra/Doxy: S <=4      -  Trimethoprim/Sulfamethoxazole: S <=0.5/9.5      -  Vancomycin: S 2      Method Type: CAMILLE    Culture - Surgical Swab (collected 03-23-19 @ 10:20)  Source: .Surgical Swab None  Preliminary Report (03-25-19 @ 19:32):    Few Coag Negative Staphylococcus  Organism: Coag Negative Staphylococcus (03-25-19 @ 19:32)  Organism: Coag Negative Staphylococcus (03-25-19 @ 19:32)      -  Ampicillin/Sulbactam: R <=8/4      -  Cefazolin: R <=4      -  Clindamycin: R >4      -  Erythromycin: S <=0.5      -  Gentamicin: S <=4 Should not be used as monotherapy      -  Oxacillin: R >2      -  Penicillin: R >8      -  RIF- Rifampin: S <=1 Should not be used as monotherapy      -  Tetra/Doxy: S <=4      -  Trimethoprim/Sulfamethoxazole: S <=0.5/9.5      -  Vancomycin: S 4      Method Type: CAMILLE    Culture - Blood (collected 03-23-19 @ 08:01)  Source: .Blood None  Gram Stain (03-24-19 @ 22:25):    Growth in anaerobic bottle: Gram Positive Cocci in Clusters    Growth in aerobic bottle: Gram Positive Cocci in Clusters  Preliminary Report (03-25-19 @ 19:46):    Growth in aerobic and anaerobic bottles: Coag Negative Staphylococcus    Single set isolate, possible contaminant. Contact    Microbiology if susceptibility testing clinically    indicated.    Culture - Blood (collected 03-22-19 @ 07:52)  Source: .Blood Blood  Gram Stain (03-24-19 @ 04:31):    Growth in anaerobic bottle: Gram Positive Cocci in Clusters  Final Report (03-26-19 @ 07:55):    Growth in anaerobic bottle: Staphylococcus epidermidis    "Due to technical problems, Proteus sp. will Not be reported as part of    the BCID panel until further notice"    ***Blood Panel PCR results on this specimen are available    approximately 3 hours after the Gram stain result.***    Gram stain, PCR, and/or culture results may not always    correspond due to difference in methodologies.    ************************************************************    This PCR assay was performed using [employer].    The following targets are tested for: Enterococcus,    vancomycin resistant enterococci, Listeria monocytogenes,    coagulase negative staphylococci, S. aureus,    methicillin resistant S. aureus, Streptococcus agalactiae    (Group B), S. pneumoniae, S. pyogenes (Group A),    Acinetobacter baumannii, Enterobacter cloacae, E. coli,    Klebsiella oxytoca, K. pneumoniae, Proteus sp.,    Serratia marcescens, Haemophilus influenzae,    Neisseria meningitidis, Pseudomonas aeruginosa, Candida    albicans, C. glabrata, C krusei, C parapsilosis,    C. tropicalis and the KPC resistance gene.  Organism: Blood Culture PCR  Staphylococcus epidermidis (03-26-19 @ 07:55)  Organism: Staphylococcus epidermidis (03-26-19 @ 07:55)      -  Amoxicillin/Clavulanic Acid: R <=4/2      -  Ampicillin: R >8      -  Ampicillin/Sulbactam: R <=8/4      -  Cefazolin: R <=4      -  Ceftriaxone: R 16      -  Ciprofloxacin: S <=1      -  Clindamycin: R >4      -  Daptomycin: S 0.5      -  Erythromycin: S <=0.25      -  Gentamicin: S <=1 Should not be used as monotherapy      -  Levofloxacin: S <=0.5      -  Linezolid: S 2      -  Meropenem: R 8      -  Moxifloxacin(Aerobic): S <=0.5      -  Oxacillin: R >2      -  Penicillin: R >8      -  RIF- Rifampin: S <=1 Should not be used as monotherapy      -  Tetra/Doxy: S 2      -  Trimethoprim/Sulfamethoxazole: S <=0.5/9.5      -  Vancomycin: S 4      Method Type: CAMILLE  Organism: Blood Culture PCR (03-26-19 @ 07:55)      -  Coagulase negative Staphylococcus: Detec      Method Type: PCR    Culture - Blood (collected 03-22-19 @ 07:52)  Source: .Blood Blood  Gram Stain (03-24-19 @ 16:58):    Growth in aerobic bottle: Gram Positive Cocci in Clusters  Preliminary Report (03-25-19 @ 20:56):    Growth in aerobic bottle: Coag Negative Staphylococcus          RADIOLOGY & ADDITIONAL TESTS:    ANTIBIOTICS:  cefepime   IVPB   100 mL/Hr IV Intermittent (03-25-19 @ 05:47)   100 mL/Hr IV Intermittent (03-24-19 @ 22:09)   100 mL/Hr IV Intermittent (03-24-19 @ 14:09)   100 mL/Hr IV Intermittent (03-24-19 @ 05:46)   100 mL/Hr IV Intermittent (03-23-19 @ 21:40)   100 mL/Hr IV Intermittent (03-23-19 @ 13:43)   100 mL/Hr IV Intermittent (03-23-19 @ 05:50)   100 mL/Hr IV Intermittent (03-23-19 @ 01:03)    vancomycin  IVPB   250 mL/Hr IV Intermittent (03-26-19 @ 06:24)   250 mL/Hr IV Intermittent (03-25-19 @ 22:08)   250 mL/Hr IV Intermittent (03-25-19 @ 05:51)   250 mL/Hr IV Intermittent (03-24-19 @ 17:08)   250 mL/Hr IV Intermittent (03-24-19 @ 06:29)   250 mL/Hr IV Intermittent (03-23-19 @ 17:14)   250 mL/Hr IV Intermittent (03-23-19 @ 05:50)        vancomycin  IVPB 1000 milliGRAM(s) IV Intermittent every 12 hours

## 2019-03-27 LAB
ANION GAP SERPL CALC-SCNC: 13 MMOL/L — SIGNIFICANT CHANGE UP (ref 7–14)
APTT BLD: 35.8 SEC — SIGNIFICANT CHANGE UP (ref 27–39.2)
BUN SERPL-MCNC: 10 MG/DL — SIGNIFICANT CHANGE UP (ref 10–20)
CALCIUM SERPL-MCNC: 8.6 MG/DL — SIGNIFICANT CHANGE UP (ref 8.5–10.1)
CHLORIDE SERPL-SCNC: 103 MMOL/L — SIGNIFICANT CHANGE UP (ref 98–110)
CO2 SERPL-SCNC: 25 MMOL/L — SIGNIFICANT CHANGE UP (ref 17–32)
CREAT SERPL-MCNC: 0.6 MG/DL — LOW (ref 0.7–1.5)
GLUCOSE SERPL-MCNC: 137 MG/DL — HIGH (ref 70–99)
HCT VFR BLD CALC: 33.1 % — LOW (ref 37–47)
HGB BLD-MCNC: 11 G/DL — LOW (ref 12–16)
INR BLD: 1.11 RATIO — SIGNIFICANT CHANGE UP (ref 0.65–1.3)
MAGNESIUM SERPL-MCNC: 1.6 MG/DL — LOW (ref 1.8–2.4)
MCHC RBC-ENTMCNC: 29.6 PG — SIGNIFICANT CHANGE UP (ref 27–31)
MCHC RBC-ENTMCNC: 33.2 G/DL — SIGNIFICANT CHANGE UP (ref 32–37)
MCV RBC AUTO: 89.2 FL — SIGNIFICANT CHANGE UP (ref 81–99)
NRBC # BLD: 0 /100 WBCS — SIGNIFICANT CHANGE UP (ref 0–0)
PHOSPHATE SERPL-MCNC: 3.6 MG/DL — SIGNIFICANT CHANGE UP (ref 2.1–4.9)
PLATELET # BLD AUTO: 271 K/UL — SIGNIFICANT CHANGE UP (ref 130–400)
POTASSIUM SERPL-MCNC: 3.3 MMOL/L — LOW (ref 3.5–5)
POTASSIUM SERPL-SCNC: 3.3 MMOL/L — LOW (ref 3.5–5)
PROTHROM AB SERPL-ACNC: 12.8 SEC — SIGNIFICANT CHANGE UP (ref 9.95–12.87)
RBC # BLD: 3.71 M/UL — LOW (ref 4.2–5.4)
RBC # FLD: 12.3 % — SIGNIFICANT CHANGE UP (ref 11.5–14.5)
SODIUM SERPL-SCNC: 141 MMOL/L — SIGNIFICANT CHANGE UP (ref 135–146)
VANCOMYCIN TROUGH SERPL-MCNC: 25 UG/ML — HIGH (ref 5–10)
WBC # BLD: 6.97 K/UL — SIGNIFICANT CHANGE UP (ref 4.8–10.8)
WBC # FLD AUTO: 6.97 K/UL — SIGNIFICANT CHANGE UP (ref 4.8–10.8)

## 2019-03-27 RX ORDER — LIDOCAINE 4 G/100G
1 CREAM TOPICAL EVERY 24 HOURS
Qty: 0 | Refills: 0 | Status: DISCONTINUED | OUTPATIENT
Start: 2019-03-27 | End: 2019-04-07

## 2019-03-27 RX ORDER — GABAPENTIN 400 MG/1
300 CAPSULE ORAL EVERY 8 HOURS
Qty: 0 | Refills: 0 | Status: DISCONTINUED | OUTPATIENT
Start: 2019-03-27 | End: 2019-04-10

## 2019-03-27 RX ORDER — CYCLOBENZAPRINE HYDROCHLORIDE 10 MG/1
10 TABLET, FILM COATED ORAL
Qty: 0 | Refills: 0 | Status: DISCONTINUED | OUTPATIENT
Start: 2019-03-27 | End: 2019-04-10

## 2019-03-27 RX ADMIN — Medication 100 MILLIGRAM(S): at 05:14

## 2019-03-27 RX ADMIN — Medication 5 MILLIGRAM(S): at 21:36

## 2019-03-27 RX ADMIN — OXYCODONE AND ACETAMINOPHEN 2 TABLET(S): 5; 325 TABLET ORAL at 18:37

## 2019-03-27 RX ADMIN — OXYCODONE AND ACETAMINOPHEN 2 TABLET(S): 5; 325 TABLET ORAL at 04:49

## 2019-03-27 RX ADMIN — MORPHINE SULFATE 2 MILLIGRAM(S): 50 CAPSULE, EXTENDED RELEASE ORAL at 14:18

## 2019-03-27 RX ADMIN — Medication 100 MILLIGRAM(S): at 21:34

## 2019-03-27 RX ADMIN — OXYCODONE AND ACETAMINOPHEN 2 TABLET(S): 5; 325 TABLET ORAL at 15:30

## 2019-03-27 RX ADMIN — OXYCODONE AND ACETAMINOPHEN 2 TABLET(S): 5; 325 TABLET ORAL at 12:08

## 2019-03-27 RX ADMIN — Medication 5 MILLIGRAM(S): at 05:12

## 2019-03-27 RX ADMIN — PREGABALIN 1000 MICROGRAM(S): 225 CAPSULE ORAL at 11:26

## 2019-03-27 RX ADMIN — MORPHINE SULFATE 2 MILLIGRAM(S): 50 CAPSULE, EXTENDED RELEASE ORAL at 17:24

## 2019-03-27 RX ADMIN — HEPARIN SODIUM 5000 UNIT(S): 5000 INJECTION INTRAVENOUS; SUBCUTANEOUS at 14:16

## 2019-03-27 RX ADMIN — Medication 5 MILLIGRAM(S): at 14:15

## 2019-03-27 RX ADMIN — OXYCODONE AND ACETAMINOPHEN 2 TABLET(S): 5; 325 TABLET ORAL at 10:33

## 2019-03-27 RX ADMIN — MORPHINE SULFATE 2 MILLIGRAM(S): 50 CAPSULE, EXTENDED RELEASE ORAL at 18:04

## 2019-03-27 RX ADMIN — Medication 1 TABLET(S): at 11:26

## 2019-03-27 RX ADMIN — Medication 100 MILLIGRAM(S): at 14:15

## 2019-03-27 RX ADMIN — Medication 166.67 MILLIGRAM(S): at 05:12

## 2019-03-27 RX ADMIN — GABAPENTIN 300 MILLIGRAM(S): 400 CAPSULE ORAL at 21:31

## 2019-03-27 RX ADMIN — HEPARIN SODIUM 5000 UNIT(S): 5000 INJECTION INTRAVENOUS; SUBCUTANEOUS at 05:14

## 2019-03-27 RX ADMIN — Medication 166.67 MILLIGRAM(S): at 21:30

## 2019-03-27 RX ADMIN — LIDOCAINE 1 PATCH: 4 CREAM TOPICAL at 23:14

## 2019-03-27 RX ADMIN — MORPHINE SULFATE 2 MILLIGRAM(S): 50 CAPSULE, EXTENDED RELEASE ORAL at 22:31

## 2019-03-27 RX ADMIN — MORPHINE SULFATE 2 MILLIGRAM(S): 50 CAPSULE, EXTENDED RELEASE ORAL at 11:34

## 2019-03-27 RX ADMIN — MORPHINE SULFATE 2 MILLIGRAM(S): 50 CAPSULE, EXTENDED RELEASE ORAL at 05:36

## 2019-03-27 RX ADMIN — MORPHINE SULFATE 2 MILLIGRAM(S): 50 CAPSULE, EXTENDED RELEASE ORAL at 07:21

## 2019-03-27 RX ADMIN — Medication 166.67 MILLIGRAM(S): at 14:15

## 2019-03-27 RX ADMIN — HEPARIN SODIUM 5000 UNIT(S): 5000 INJECTION INTRAVENOUS; SUBCUTANEOUS at 21:33

## 2019-03-27 RX ADMIN — PANTOPRAZOLE SODIUM 40 MILLIGRAM(S): 20 TABLET, DELAYED RELEASE ORAL at 05:15

## 2019-03-27 NOTE — CONSULT NOTE ADULT - SUBJECTIVE AND OBJECTIVE BOX
Chief Complaint:    HPI:  48 year old female status post lumbar laminectomy x2 Now POD# 5 for I&D.  Patient notes continued discomfort at incisional site.  Notes burning pain into the buttocks.  Pain worsened with movement, sitting and ambulation.  Patient note that current pain medications are less effective then when given initially.      PAST MEDICAL & SURGICAL HISTORY:  Herniated disc, cervical  Hypertension  H/O lumbar discectomy      FAMILY HISTORY:      SOCIAL HISTORY:  [x] Denies Smoking, Alcohol, or Drug Use    Allergies    No Known Allergies    Intolerances        PAIN MEDICATIONS:  acetaminophen   Tablet .. 650 milliGRAM(s) Oral every 6 hours PRN  diazepam    Tablet 5 milliGRAM(s) Oral every 8 hours  morphine  - Injectable 2 milliGRAM(s) IV Push every 4 hours PRN  ondansetron Injectable 4 milliGRAM(s) IV Push every 6 hours PRN  oxyCODONE    5 mG/acetaminophen 325 mG 2 Tablet(s) Oral every 4 hours PRN  oxyCODONE    5 mG/acetaminophen 325 mG 1 Tablet(s) Oral every 4 hours PRN    Heme:  heparin  Injectable 5000 Unit(s) SubCutaneous every 8 hours    Antibiotics:  vancomycin  IVPB 1250 milliGRAM(s) IV Intermittent every 8 hours    Cardiovascular:  labetalol 100 milliGRAM(s) Oral daily    GI:  docusate sodium 100 milliGRAM(s) Oral three times a day  pantoprazole    Tablet 40 milliGRAM(s) Oral before breakfast  senna 2 Tablet(s) Oral at bedtime    Endocrine:    All Other Medications:  cyanocobalamin 1000 MICROGram(s) Oral daily  multivitamin 1 Tablet(s) Oral daily      REVIEW OF SYSTEMS:    CONSTITUTIONAL: No fever, weight loss, or fatigue  EYES: No eye pain, visual disturbances, or discharge  ENMT:  No difficulty hearing, tinnitus, vertigo; No sinus or throat pain  NECK: No pain or stiffness  BREASTS: No pain, masses, or nipple discharge  RESPIRATORY: No cough, wheezing, chills or hemoptysis; No shortness of breath  CARDIOVASCULAR: No chest pain, palpitations, dizziness, or leg swelling  GASTROINTESTINAL: No abdominal or epigastric pain. No nausea, vomiting, or hematemesis; No diarrhea or constipation. No melena or hematochezia.  GENITOURINARY: No dysuria, frequency, hematuria, or incontinence  NEUROLOGICAL: No headaches, memory loss, loss of strength, numbness, or tremors  SKIN: No itching, burning, rashes, or lesions   LYMPH NODES: No enlarged glands  ENDOCRINE: No heat or cold intolerance; No hair loss  MUSCULOSKELETAL: As per HPI  PSYCHIATRIC: No depression, anxiety, mood swings, or difficulty sleeping  HEME/LYMPH: No easy bruising, or bleeding gums  ALLERY AND IMMUNOLOGIC: No hives or eczema      Vital Signs Last 24 Hrs  T(C): 36.9 (27 Mar 2019 14:28), Max: 37.1 (26 Mar 2019 22:45)  T(F): 98.4 (27 Mar 2019 14:28), Max: 98.7 (26 Mar 2019 22:45)  HR: 88 (27 Mar 2019 14:28) (82 - 91)  BP: 132/66 (27 Mar 2019 14:28) (121/65 - 146/70)  BP(mean): --  RR: 17 (27 Mar 2019 14:28) (17 - 20)  SpO2: --    PAIN SCORE:  10       SCALE USED: (1-10 VNRS)             PHYSICAL EXAM:    GENERAL: NAD, well-groomed, well-developed  HEAD:  Atraumatic, Normocephalic  EYES: EOMI, PERRLA, conjunctiva and sclera clear  ENMT: No tonsillar erythema, exudates, or enlargement; Moist mucous membranes, Good dentition, No lesions  MUSCULOSKELETAL: ++TTP over the left side of incision.    NERVOUS SYSTEM:  Alert & Oriented X3, Good concentration; Motor Strength 5/5 B/L upper and lower extremities; DTRs 2+ intact and symmetric  CHEST/LUNG: Clear to percussion bilaterally; No rales, rhonchi, wheezing, or rubs  HEART: Regular rate and rhythm; No murmurs, rubs, or gallops  ABDOMEN: Soft, Nontender, Nondistended; Bowel sounds present  EXTREMITIES:  2+ Peripheral Pulses, No clubbing, cyanosis, or edema  LYMPH: No lymphadenopathy noted  SKIN: No rashes or lesions        LABS:                          11.0   6.97  )-----------( 271      ( 27 Mar 2019 06:32 )             33.1     03-27    141  |  103  |  10  ----------------------------<  137<H>  3.3<L>   |  25  |  0.6<L>    Ca    8.6      27 Mar 2019 06:32  Phos  3.6     03-27  Mg     1.6     03-27      PT/INR - ( 27 Mar 2019 06:32 )   PT: 12.80 sec;   INR: 1.11 ratio         PTT - ( 27 Mar 2019 06:32 )  PTT:35.8 sec

## 2019-03-27 NOTE — CONSULT NOTE ADULT - ASSESSMENT
47 y/o F with low back pain after lumbar laminectomy and I&D    1.  Start Gabapentin 300mg TID, can increase to 400mg TID as tolerated     2.  Start Flexeril 10mg BID PRN     3.  If ok with surgery, please start patient on Toradol 30mg Q6hrs PRN.  Patient should be given NSAID 1st and then IV opioid 15 min after if pain persists    4.  Lidocaine 5% patch to painful area.    5.  PT/OT and out of chair to bed as tolerate often    6.  Patient informed of GYN imaging findings, is to follow up with GYN as out patient.

## 2019-03-27 NOTE — PROGRESS NOTE ADULT - SUBJECTIVE AND OBJECTIVE BOX
KAMI, DANIEL  48y, Female      OVERNIGHT EVENTS:  MRI no hip OM or abscess, High-grade insertional partial-thickness tear of the anterior half of the gluteus medius.    ROS negative except as per above    VITALS:  T(F): 97.1, Max: 98.7 (03-26-19 @ 22:45)  HR: 82  BP: 121/65  RR: 20Vital Signs Last 24 Hrs  T(C): 36.2 (27 Mar 2019 06:08), Max: 37.1 (26 Mar 2019 22:45)  T(F): 97.1 (27 Mar 2019 06:08), Max: 98.7 (26 Mar 2019 22:45)  HR: 82 (27 Mar 2019 06:08) (80 - 91)  BP: 121/65 (27 Mar 2019 06:08) (121/65 - 146/70)  BP(mean): --  RR: 20 (27 Mar 2019 06:08) (18 - 20)  SpO2: --    PHYSICAL EXAM  Gen: Awake and alert, non-toxic appearing, NAD  HEENT: NCAT.  CV: RRR, no murmurs  Lungs: CTAB, no w/r/r  Abd: Soft. NTND  Back: dressings , TTP  Extr: wwp, no edema  Skin: no rash  Neuro: No focal deficits  Lines: clean      TESTS & MEASUREMENTS:                        11.0   6.97  )-----------( 271      ( 27 Mar 2019 06:32 )             33.1     03-27    141  |  103  |  10  ----------------------------<  137<H>  3.3<L>   |  25  |  0.6<L>    Ca    8.6      27 Mar 2019 06:32  Phos  3.6     03-27  Mg     1.6     03-27          Culture - Blood (collected 03-24-19 @ 20:55)  Source: .Blood None  Gram Stain (03-26-19 @ 07:56):    Growth in anaerobic bottle: Gram Positive Cocci in Clusters  Preliminary Report (03-26-19 @ 07:56):    Growth in anaerobic bottle: Gram Positive Cocci in Clusters    Culture - Blood (collected 03-24-19 @ 18:02)  Source: .Blood None  Preliminary Report (03-26-19 @ 06:01):    No growth to date.    Culture - Surgical Swab (collected 03-23-19 @ 10:25)  Source: .Surgical Swab None  Preliminary Report (03-25-19 @ 19:30):    Few Coag Negative Staphylococcus  Organism: Coag Negative Staphylococcus (03-25-19 @ 19:30)  Organism: Coag Negative Staphylococcus (03-25-19 @ 19:30)      -  Ampicillin/Sulbactam: R <=8/4      -  Cefazolin: R <=4      -  Clindamycin: R >4      -  Erythromycin: S <=0.5      -  Gentamicin: S <=4 Should not be used as monotherapy      -  Oxacillin: R >2      -  Penicillin: R 8      -  RIF- Rifampin: S <=1 Should not be used as monotherapy      -  Tetra/Doxy: S <=4      -  Trimethoprim/Sulfamethoxazole: S <=0.5/9.5      -  Vancomycin: S 2      Method Type: CAMILLE    Culture - Surgical Swab (collected 03-23-19 @ 10:22)  Source: .Surgical Swab None  Preliminary Report (03-25-19 @ 19:28):    Few Coag Negative Staphylococcus  Organism: Coag Negative Staphylococcus (03-25-19 @ 19:28)  Organism: Coag Negative Staphylococcus (03-25-19 @ 19:28)      -  Ampicillin/Sulbactam: R <=8/4      -  Cefazolin: R <=4      -  Clindamycin: R >4      -  Erythromycin: S <=0.5      -  Gentamicin: S <=4 Should not be used as monotherapy      -  Oxacillin: R >2      -  Penicillin: R 8      -  RIF- Rifampin: S <=1 Should not be used as monotherapy      -  Tetra/Doxy: S <=4      -  Trimethoprim/Sulfamethoxazole: S <=0.5/9.5      -  Vancomycin: S 2      Method Type: CAMILLE    Culture - Surgical Swab (collected 03-23-19 @ 10:20)  Source: .Surgical Swab None  Preliminary Report (03-25-19 @ 19:32):    Few Coag Negative Staphylococcus  Organism: Coag Negative Staphylococcus (03-25-19 @ 19:32)  Organism: Coag Negative Staphylococcus (03-25-19 @ 19:32)      -  Ampicillin/Sulbactam: R <=8/4      -  Cefazolin: R <=4      -  Clindamycin: R >4      -  Erythromycin: S <=0.5      -  Gentamicin: S <=4 Should not be used as monotherapy      -  Oxacillin: R >2      -  Penicillin: R >8      -  RIF- Rifampin: S <=1 Should not be used as monotherapy      -  Tetra/Doxy: S <=4      -  Trimethoprim/Sulfamethoxazole: S <=0.5/9.5      -  Vancomycin: S 4      Method Type: CAMILLE    Culture - Blood (collected 03-23-19 @ 08:01)  Source: .Blood None  Gram Stain (03-24-19 @ 22:25):    Growth in anaerobic bottle: Gram Positive Cocci in Clusters    Growth in aerobic bottle: Gram Positive Cocci in Clusters  Final Report (03-26-19 @ 16:43):    Growth in aerobic and anaerobic bottles: Coag Negative Staphylococcus    Single set isolate, possible contaminant. Contact    Microbiology if susceptibility testing clinically    indicated.    Culture - Blood (collected 03-22-19 @ 07:52)  Source: .Blood Blood  Gram Stain (03-24-19 @ 04:31):    Growth in anaerobic bottle: Gram Positive Cocci in Clusters  Final Report (03-26-19 @ 07:55):    Growth in anaerobic bottle: Staphylococcus epidermidis    "Due to technical problems, Proteus sp. will Not be reported as part of    the BCID panel until further notice"    ***Blood Panel PCR results on this specimen are available    approximately 3 hours after the Gram stain result.***    Gram stain, PCR, and/or culture results may not always    correspond due to difference in methodologies.    ************************************************************    This PCR assay was performed using [employer].    The following targets are tested for: Enterococcus,    vancomycin resistant enterococci, Listeria monocytogenes,    coagulase negative staphylococci, S. aureus,    methicillin resistant S. aureus, Streptococcus agalactiae    (Group B), S. pneumoniae, S. pyogenes (Group A),    Acinetobacter baumannii, Enterobacter cloacae, E. coli,    Klebsiella oxytoca, K. pneumoniae, Proteus sp.,    Serratia marcescens, Haemophilus influenzae,    Neisseria meningitidis, Pseudomonas aeruginosa, Candida    albicans, C. glabrata, C krusei, C parapsilosis,    C. tropicalis and the KPC resistance gene.  Organism: Blood Culture PCR  Staphylococcus epidermidis (03-26-19 @ 07:55)  Organism: Staphylococcus epidermidis (03-26-19 @ 07:55)      -  Amoxicillin/Clavulanic Acid: R <=4/2      -  Ampicillin: R >8      -  Ampicillin/Sulbactam: R <=8/4      -  Cefazolin: R <=4      -  Ceftriaxone: R 16      -  Ciprofloxacin: S <=1      -  Clindamycin: R >4      -  Daptomycin: S 0.5      -  Erythromycin: S <=0.25      -  Gentamicin: S <=1 Should not be used as monotherapy      -  Levofloxacin: S <=0.5      -  Linezolid: S 2      -  Meropenem: R 8      -  Moxifloxacin(Aerobic): S <=0.5      -  Oxacillin: R >2      -  Penicillin: R >8      -  RIF- Rifampin: S <=1 Should not be used as monotherapy      -  Tetra/Doxy: S 2      -  Trimethoprim/Sulfamethoxazole: S <=0.5/9.5      -  Vancomycin: S 4      Method Type: CAMILLE  Organism: Blood Culture PCR (03-26-19 @ 07:55)      -  Coagulase negative Staphylococcus: Detec      Method Type: PCR    Culture - Blood (collected 03-22-19 @ 07:52)  Source: .Blood Blood  Gram Stain (03-24-19 @ 16:58):    Growth in aerobic bottle: Gram Positive Cocci in Clusters  Final Report (03-26-19 @ 16:27):    Growth in aerobic bottle: Staphylococcus epidermidis  Organism: Coag Negative Staphylococcus (03-26-19 @ 16:27)  Organism: Coag Negative Staphylococcus (03-26-19 @ 16:27)      -  Ampicillin/Sulbactam: R <=8/4      -  Cefazolin: R <=4      -  Clindamycin: R >4      -  Erythromycin: S <=0.25      -  Gentamicin: S <=1 Should not be used as monotherapy      -  Oxacillin: R >2      -  Penicillin: R >8      -  RIF- Rifampin: S <=1 Should not be used as monotherapy      -  Tetra/Doxy: S 2      -  Trimethoprim/Sulfamethoxazole: S <=0.5/9.5      -  Vancomycin: S 4      Method Type: CAMILLE          RADIOLOGY & ADDITIONAL TESTS:    ANTIBIOTICS:  cefepime   IVPB   100 mL/Hr IV Intermittent (03-25-19 @ 05:47)   100 mL/Hr IV Intermittent (03-24-19 @ 22:09)   100 mL/Hr IV Intermittent (03-24-19 @ 14:09)   100 mL/Hr IV Intermittent (03-24-19 @ 05:46)   100 mL/Hr IV Intermittent (03-23-19 @ 21:40)   100 mL/Hr IV Intermittent (03-23-19 @ 13:43)   100 mL/Hr IV Intermittent (03-23-19 @ 05:50)   100 mL/Hr IV Intermittent (03-23-19 @ 01:03)    vancomycin  IVPB   250 mL/Hr IV Intermittent (03-26-19 @ 06:24)   250 mL/Hr IV Intermittent (03-25-19 @ 22:08)   250 mL/Hr IV Intermittent (03-25-19 @ 05:51)   250 mL/Hr IV Intermittent (03-24-19 @ 17:08)   250 mL/Hr IV Intermittent (03-24-19 @ 06:29)   250 mL/Hr IV Intermittent (03-23-19 @ 17:14)   250 mL/Hr IV Intermittent (03-23-19 @ 05:50)    vancomycin  IVPB   166.67 mL/Hr IV Intermittent (03-27-19 @ 05:12)   166.67 mL/Hr IV Intermittent (03-26-19 @ 21:53)   166.67 mL/Hr IV Intermittent (03-26-19 @ 16:59)        vancomycin  IVPB 1250 milliGRAM(s) IV Intermittent every 8 hours

## 2019-03-27 NOTE — PROGRESS NOTE ADULT - ASSESSMENT
48yF    Herniated disc, cervical  Hypertension  s/p 2/2019 laminectomy    Admitted with f/c POSTOPERATIVE INFECTION with incisional subcutaneous abscess  Sepsis ruled out on admission  WBC 6.4  S/p OR 3/22  Bcx 3/22 coNS  Bcx 3/23 coNS  Bcx 3/24 coNS  WCx coNS  MRI no hip OM or abscess, High-grade insertional partial-thickness tear of the anterior half of the gluteus medius.  ESR 51 CRP 9    - Vanc 1.25g q8h  - Please recheck vanc trough 30 min prior to 4th dose  - TTE  - Daily bcx until clears 48h, will then need PICC and likely 4-6 weeks IV abx pending TTE results

## 2019-03-28 LAB
-  AMPICILLIN/SULBACTAM: SIGNIFICANT CHANGE UP
-  CEFAZOLIN: SIGNIFICANT CHANGE UP
-  CLINDAMYCIN: SIGNIFICANT CHANGE UP
-  ERYTHROMYCIN: SIGNIFICANT CHANGE UP
-  GENTAMICIN: SIGNIFICANT CHANGE UP
-  OXACILLIN: SIGNIFICANT CHANGE UP
-  PENICILLIN: SIGNIFICANT CHANGE UP
-  RIFAMPIN: SIGNIFICANT CHANGE UP
-  TETRACYCLINE: SIGNIFICANT CHANGE UP
-  TRIMETHOPRIM/SULFAMETHOXAZOLE: SIGNIFICANT CHANGE UP
-  VANCOMYCIN: SIGNIFICANT CHANGE UP
CULTURE RESULTS: SIGNIFICANT CHANGE UP
METHOD TYPE: SIGNIFICANT CHANGE UP
ORGANISM # SPEC MICROSCOPIC CNT: SIGNIFICANT CHANGE UP
ORGANISM # SPEC MICROSCOPIC CNT: SIGNIFICANT CHANGE UP
SPECIMEN SOURCE: SIGNIFICANT CHANGE UP
VANCOMYCIN TROUGH SERPL-MCNC: 33.2 UG/ML — HIGH (ref 5–10)

## 2019-03-28 RX ORDER — KETOROLAC TROMETHAMINE 30 MG/ML
15 SYRINGE (ML) INJECTION EVERY 6 HOURS
Qty: 0 | Refills: 0 | Status: DISCONTINUED | OUTPATIENT
Start: 2019-03-28 | End: 2019-03-30

## 2019-03-28 RX ORDER — VANCOMYCIN HCL 1 G
1250 VIAL (EA) INTRAVENOUS EVERY 12 HOURS
Qty: 0 | Refills: 0 | Status: DISCONTINUED | OUTPATIENT
Start: 2019-03-28 | End: 2019-03-30

## 2019-03-28 RX ADMIN — GABAPENTIN 300 MILLIGRAM(S): 400 CAPSULE ORAL at 22:28

## 2019-03-28 RX ADMIN — LIDOCAINE 1 PATCH: 4 CREAM TOPICAL at 11:53

## 2019-03-28 RX ADMIN — Medication 100 MILLIGRAM(S): at 06:09

## 2019-03-28 RX ADMIN — OXYCODONE AND ACETAMINOPHEN 2 TABLET(S): 5; 325 TABLET ORAL at 10:51

## 2019-03-28 RX ADMIN — Medication 15 MILLIGRAM(S): at 12:34

## 2019-03-28 RX ADMIN — OXYCODONE AND ACETAMINOPHEN 2 TABLET(S): 5; 325 TABLET ORAL at 22:30

## 2019-03-28 RX ADMIN — LIDOCAINE 1 PATCH: 4 CREAM TOPICAL at 08:15

## 2019-03-28 RX ADMIN — Medication 1 TABLET(S): at 11:54

## 2019-03-28 RX ADMIN — PANTOPRAZOLE SODIUM 40 MILLIGRAM(S): 20 TABLET, DELAYED RELEASE ORAL at 06:09

## 2019-03-28 RX ADMIN — Medication 15 MILLIGRAM(S): at 19:01

## 2019-03-28 RX ADMIN — Medication 100 MILLIGRAM(S): at 22:28

## 2019-03-28 RX ADMIN — LIDOCAINE 1 PATCH: 4 CREAM TOPICAL at 22:30

## 2019-03-28 RX ADMIN — SENNA PLUS 2 TABLET(S): 8.6 TABLET ORAL at 22:28

## 2019-03-28 RX ADMIN — MORPHINE SULFATE 2 MILLIGRAM(S): 50 CAPSULE, EXTENDED RELEASE ORAL at 10:49

## 2019-03-28 RX ADMIN — Medication 15 MILLIGRAM(S): at 00:06

## 2019-03-28 RX ADMIN — GABAPENTIN 300 MILLIGRAM(S): 400 CAPSULE ORAL at 06:09

## 2019-03-28 RX ADMIN — MORPHINE SULFATE 2 MILLIGRAM(S): 50 CAPSULE, EXTENDED RELEASE ORAL at 08:54

## 2019-03-28 RX ADMIN — HEPARIN SODIUM 5000 UNIT(S): 5000 INJECTION INTRAVENOUS; SUBCUTANEOUS at 22:29

## 2019-03-28 RX ADMIN — PREGABALIN 1000 MICROGRAM(S): 225 CAPSULE ORAL at 11:17

## 2019-03-28 RX ADMIN — Medication 100 MILLIGRAM(S): at 06:08

## 2019-03-28 RX ADMIN — Medication 15 MILLIGRAM(S): at 11:16

## 2019-03-28 RX ADMIN — Medication 166.67 MILLIGRAM(S): at 06:03

## 2019-03-28 RX ADMIN — Medication 5 MILLIGRAM(S): at 06:06

## 2019-03-28 RX ADMIN — Medication 5 MILLIGRAM(S): at 22:28

## 2019-03-28 RX ADMIN — HEPARIN SODIUM 5000 UNIT(S): 5000 INJECTION INTRAVENOUS; SUBCUTANEOUS at 06:09

## 2019-03-28 NOTE — PROGRESS NOTE ADULT - SUBJECTIVE AND OBJECTIVE BOX
KAMIDANIEL CUEVAS  48y, Female      OVERNIGHT EVENTS:    no fevers, pain at surgical site    VITALS:  T(F): 98.5, Max: 98.7 (03-27-19 @ 21:42)  HR: 85  BP: 141/75  RR: 18Vital Signs Last 24 Hrs  T(C): 36.9 (28 Mar 2019 05:46), Max: 37.1 (27 Mar 2019 21:42)  T(F): 98.5 (28 Mar 2019 05:46), Max: 98.7 (27 Mar 2019 21:42)  HR: 85 (28 Mar 2019 05:46) (84 - 88)  BP: 141/75 (28 Mar 2019 05:46) (132/66 - 141/75)  BP(mean): --  RR: 18 (28 Mar 2019 05:46) (17 - 18)  SpO2: --    TESTS & MEASUREMENTS:                        11.0   6.97  )-----------( 271      ( 27 Mar 2019 06:32 )             33.1     03-27    141  |  103  |  10  ----------------------------<  137<H>  3.3<L>   |  25  |  0.6<L>    Ca    8.6      27 Mar 2019 06:32  Phos  3.6     03-27  Mg     1.6     03-27          Culture - Blood (collected 03-26-19 @ 19:56)  Source: .Blood None  Preliminary Report (03-28-19 @ 02:04):    No growth to date.    Culture - Blood (collected 03-24-19 @ 20:55)  Source: .Blood None  Gram Stain (03-26-19 @ 07:56):    Growth in anaerobic bottle: Gram Positive Cocci in Clusters  Preliminary Report (03-27-19 @ 14:52):    Growth in anaerobic bottle: Staphylococcus epidermidis    Susceptibility to follow.    Culture - Blood (collected 03-24-19 @ 18:02)  Source: .Blood None  Preliminary Report (03-26-19 @ 06:01):    No growth to date.    Culture - Surgical Swab (collected 03-23-19 @ 10:25)  Source: .Surgical Swab None  Preliminary Report (03-25-19 @ 19:30):    Few Coag Negative Staphylococcus  Organism: Coag Negative Staphylococcus (03-25-19 @ 19:30)  Organism: Coag Negative Staphylococcus (03-25-19 @ 19:30)      -  Ampicillin/Sulbactam: R <=8/4      -  Cefazolin: R <=4      -  Clindamycin: R >4      -  Erythromycin: S <=0.5      -  Gentamicin: S <=4 Should not be used as monotherapy      -  Oxacillin: R >2      -  Penicillin: R 8      -  RIF- Rifampin: S <=1 Should not be used as monotherapy      -  Tetra/Doxy: S <=4      -  Trimethoprim/Sulfamethoxazole: S <=0.5/9.5      -  Vancomycin: S 2      Method Type: CAMILLE    Culture - Surgical Swab (collected 03-23-19 @ 10:22)  Source: .Surgical Swab None  Preliminary Report (03-25-19 @ 19:28):    Few Coag Negative Staphylococcus  Organism: Coag Negative Staphylococcus (03-25-19 @ 19:28)  Organism: Coag Negative Staphylococcus (03-25-19 @ 19:28)      -  Ampicillin/Sulbactam: R <=8/4      -  Cefazolin: R <=4      -  Clindamycin: R >4      -  Erythromycin: S <=0.5      -  Gentamicin: S <=4 Should not be used as monotherapy      -  Oxacillin: R >2      -  Penicillin: R 8      -  RIF- Rifampin: S <=1 Should not be used as monotherapy      -  Tetra/Doxy: S <=4      -  Trimethoprim/Sulfamethoxazole: S <=0.5/9.5      -  Vancomycin: S 2      Method Type: CAMILLE    Culture - Surgical Swab (collected 03-23-19 @ 10:20)  Source: .Surgical Swab None  Preliminary Report (03-25-19 @ 19:32):    Few Coag Negative Staphylococcus  Organism: Coag Negative Staphylococcus (03-25-19 @ 19:32)  Organism: Coag Negative Staphylococcus (03-25-19 @ 19:32)      -  Ampicillin/Sulbactam: R <=8/4      -  Cefazolin: R <=4      -  Clindamycin: R >4      -  Erythromycin: S <=0.5      -  Gentamicin: S <=4 Should not be used as monotherapy      -  Oxacillin: R >2      -  Penicillin: R >8      -  RIF- Rifampin: S <=1 Should not be used as monotherapy      -  Tetra/Doxy: S <=4      -  Trimethoprim/Sulfamethoxazole: S <=0.5/9.5      -  Vancomycin: S 4      Method Type: CAMILLE    Culture - Blood (collected 03-23-19 @ 08:01)  Source: .Blood None  Gram Stain (03-24-19 @ 22:25):    Growth in anaerobic bottle: Gram Positive Cocci in Clusters    Growth in aerobic bottle: Gram Positive Cocci in Clusters  Final Report (03-26-19 @ 16:43):    Growth in aerobic and anaerobic bottles: Coag Negative Staphylococcus    Single set isolate, possible contaminant. Contact    Microbiology if susceptibility testing clinically    indicated.    Culture - Blood (collected 03-22-19 @ 07:52)  Source: .Blood Blood  Gram Stain (03-24-19 @ 04:31):    Growth in anaerobic bottle: Gram Positive Cocci in Clusters  Final Report (03-26-19 @ 07:55):    Growth in anaerobic bottle: Staphylococcus epidermidis    "Due to technical problems, Proteus sp. will Not be reported as part of    the BCID panel until further notice"    ***Blood Panel PCR results on this specimen are available    approximately 3 hours after the Gram stain result.***    Gram stain, PCR, and/or culture results may not always    correspond due to difference in methodologies.    ************************************************************    This PCR assay was performed using [employer].    The following targets are tested for: Enterococcus,    vancomycin resistant enterococci, Listeria monocytogenes,    coagulase negative staphylococci, S. aureus,    methicillin resistant S. aureus, Streptococcus agalactiae    (Group B), S. pneumoniae, S. pyogenes (Group A),    Acinetobacter baumannii, Enterobacter cloacae, E. coli,    Klebsiella oxytoca, K. pneumoniae, Proteus sp.,    Serratia marcescens, Haemophilus influenzae,    Neisseria meningitidis, Pseudomonas aeruginosa, Candida    albicans, C. glabrata, C krusei, C parapsilosis,    C. tropicalis and the KPC resistance gene.  Organism: Blood Culture PCR  Staphylococcus epidermidis (03-26-19 @ 07:55)  Organism: Staphylococcus epidermidis (03-26-19 @ 07:55)      -  Amoxicillin/Clavulanic Acid: R <=4/2      -  Ampicillin: R >8      -  Ampicillin/Sulbactam: R <=8/4      -  Cefazolin: R <=4      -  Ceftriaxone: R 16      -  Ciprofloxacin: S <=1      -  Clindamycin: R >4      -  Daptomycin: S 0.5      -  Erythromycin: S <=0.25      -  Gentamicin: S <=1 Should not be used as monotherapy      -  Levofloxacin: S <=0.5      -  Linezolid: S 2      -  Meropenem: R 8      -  Moxifloxacin(Aerobic): S <=0.5      -  Oxacillin: R >2      -  Penicillin: R >8      -  RIF- Rifampin: S <=1 Should not be used as monotherapy      -  Tetra/Doxy: S 2      -  Trimethoprim/Sulfamethoxazole: S <=0.5/9.5      -  Vancomycin: S 4      Method Type: CAMILLE  Organism: Blood Culture PCR (03-26-19 @ 07:55)      -  Coagulase negative Staphylococcus: Detec      Method Type: PCR    Culture - Blood (collected 03-22-19 @ 07:52)  Source: .Blood Blood  Gram Stain (03-24-19 @ 16:58):    Growth in aerobic bottle: Gram Positive Cocci in Clusters  Final Report (03-26-19 @ 16:27):    Growth in aerobic bottle: Staphylococcus epidermidis  Organism: Coag Negative Staphylococcus (03-26-19 @ 16:27)  Organism: Coag Negative Staphylococcus (03-26-19 @ 16:27)      -  Ampicillin/Sulbactam: R <=8/4      -  Cefazolin: R <=4      -  Clindamycin: R >4      -  Erythromycin: S <=0.25      -  Gentamicin: S <=1 Should not be used as monotherapy      -  Oxacillin: R >2      -  Penicillin: R >8      -  RIF- Rifampin: S <=1 Should not be used as monotherapy      -  Tetra/Doxy: S 2      -  Trimethoprim/Sulfamethoxazole: S <=0.5/9.5      -  Vancomycin: S 4      Method Type: CAMILLE            RADIOLOGY & ADDITIONAL TESTS:    ANTIBIOTICS:  vancomycin  IVPB 1250 milliGRAM(s) IV Intermittent every 12 hours

## 2019-03-28 NOTE — PROGRESS NOTE ADULT - ASSESSMENT
· Assessment		  48yF    Herniated disc, cervical  Hypertension  s/p 2/2019 laminectomy    Admitted with f/c POSTOPERATIVE INFECTION with incisional subcutaneous abscess  Sepsis ruled out on admission  WBC 6.9  S/p OR 3/22  Bcx 322, 23 , 24  with coNS,   Bcx 3/26 NTD  WCx Staph species  Vanco level 25    - Vanc 1250 mg iv q12h  - Please check vanc in 48h

## 2019-03-29 LAB
ANION GAP SERPL CALC-SCNC: 15 MMOL/L — HIGH (ref 7–14)
ANION GAP SERPL CALC-SCNC: 15 MMOL/L — HIGH (ref 7–14)
BUN SERPL-MCNC: 21 MG/DL — HIGH (ref 10–20)
BUN SERPL-MCNC: 26 MG/DL — HIGH (ref 10–20)
CALCIUM SERPL-MCNC: 8.6 MG/DL — SIGNIFICANT CHANGE UP (ref 8.5–10.1)
CALCIUM SERPL-MCNC: 8.9 MG/DL — SIGNIFICANT CHANGE UP (ref 8.5–10.1)
CHLORIDE SERPL-SCNC: 101 MMOL/L — SIGNIFICANT CHANGE UP (ref 98–110)
CHLORIDE SERPL-SCNC: 98 MMOL/L — SIGNIFICANT CHANGE UP (ref 98–110)
CO2 SERPL-SCNC: 25 MMOL/L — SIGNIFICANT CHANGE UP (ref 17–32)
CO2 SERPL-SCNC: 27 MMOL/L — SIGNIFICANT CHANGE UP (ref 17–32)
CREAT SERPL-MCNC: 1.8 MG/DL — HIGH (ref 0.7–1.5)
CREAT SERPL-MCNC: 1.9 MG/DL — HIGH (ref 0.7–1.5)
GLUCOSE SERPL-MCNC: 97 MG/DL — SIGNIFICANT CHANGE UP (ref 70–99)
GLUCOSE SERPL-MCNC: 99 MG/DL — SIGNIFICANT CHANGE UP (ref 70–99)
HCT VFR BLD CALC: 36.2 % — LOW (ref 37–47)
HGB BLD-MCNC: 11.9 G/DL — LOW (ref 12–16)
MCHC RBC-ENTMCNC: 29.8 PG — SIGNIFICANT CHANGE UP (ref 27–31)
MCHC RBC-ENTMCNC: 32.9 G/DL — SIGNIFICANT CHANGE UP (ref 32–37)
MCV RBC AUTO: 90.5 FL — SIGNIFICANT CHANGE UP (ref 81–99)
NRBC # BLD: 0 /100 WBCS — SIGNIFICANT CHANGE UP (ref 0–0)
PLATELET # BLD AUTO: 380 K/UL — SIGNIFICANT CHANGE UP (ref 130–400)
POTASSIUM SERPL-MCNC: 3.7 MMOL/L — SIGNIFICANT CHANGE UP (ref 3.5–5)
POTASSIUM SERPL-MCNC: 4.6 MMOL/L — SIGNIFICANT CHANGE UP (ref 3.5–5)
POTASSIUM SERPL-SCNC: 3.7 MMOL/L — SIGNIFICANT CHANGE UP (ref 3.5–5)
POTASSIUM SERPL-SCNC: 4.6 MMOL/L — SIGNIFICANT CHANGE UP (ref 3.5–5)
RBC # BLD: 4 M/UL — LOW (ref 4.2–5.4)
RBC # FLD: 12.4 % — SIGNIFICANT CHANGE UP (ref 11.5–14.5)
SODIUM SERPL-SCNC: 140 MMOL/L — SIGNIFICANT CHANGE UP (ref 135–146)
SODIUM SERPL-SCNC: 141 MMOL/L — SIGNIFICANT CHANGE UP (ref 135–146)
VANCOMYCIN TROUGH SERPL-MCNC: 20.4 UG/ML — HIGH (ref 5–10)
WBC # BLD: 11.67 K/UL — HIGH (ref 4.8–10.8)
WBC # FLD AUTO: 11.67 K/UL — HIGH (ref 4.8–10.8)

## 2019-03-29 RX ORDER — SODIUM CHLORIDE 9 MG/ML
1000 INJECTION INTRAMUSCULAR; INTRAVENOUS; SUBCUTANEOUS
Qty: 0 | Refills: 0 | Status: DISCONTINUED | OUTPATIENT
Start: 2019-03-29 | End: 2019-04-10

## 2019-03-29 RX ADMIN — SENNA PLUS 2 TABLET(S): 8.6 TABLET ORAL at 21:11

## 2019-03-29 RX ADMIN — Medication 5 MILLIGRAM(S): at 06:05

## 2019-03-29 RX ADMIN — OXYCODONE AND ACETAMINOPHEN 2 TABLET(S): 5; 325 TABLET ORAL at 21:21

## 2019-03-29 RX ADMIN — Medication 1 TABLET(S): at 12:09

## 2019-03-29 RX ADMIN — HEPARIN SODIUM 5000 UNIT(S): 5000 INJECTION INTRAVENOUS; SUBCUTANEOUS at 21:11

## 2019-03-29 RX ADMIN — Medication 100 MILLIGRAM(S): at 06:05

## 2019-03-29 RX ADMIN — MORPHINE SULFATE 2 MILLIGRAM(S): 50 CAPSULE, EXTENDED RELEASE ORAL at 18:43

## 2019-03-29 RX ADMIN — Medication 100 MILLIGRAM(S): at 21:12

## 2019-03-29 RX ADMIN — GABAPENTIN 300 MILLIGRAM(S): 400 CAPSULE ORAL at 21:11

## 2019-03-29 RX ADMIN — PANTOPRAZOLE SODIUM 40 MILLIGRAM(S): 20 TABLET, DELAYED RELEASE ORAL at 06:07

## 2019-03-29 RX ADMIN — Medication 15 MILLIGRAM(S): at 06:06

## 2019-03-29 RX ADMIN — GABAPENTIN 300 MILLIGRAM(S): 400 CAPSULE ORAL at 06:05

## 2019-03-29 RX ADMIN — Medication 100 MILLIGRAM(S): at 13:33

## 2019-03-29 RX ADMIN — LIDOCAINE 1 PATCH: 4 CREAM TOPICAL at 21:38

## 2019-03-29 RX ADMIN — Medication 5 MILLIGRAM(S): at 13:33

## 2019-03-29 RX ADMIN — Medication 5 MILLIGRAM(S): at 21:11

## 2019-03-29 RX ADMIN — OXYCODONE AND ACETAMINOPHEN 2 TABLET(S): 5; 325 TABLET ORAL at 06:06

## 2019-03-29 RX ADMIN — PREGABALIN 1000 MICROGRAM(S): 225 CAPSULE ORAL at 12:09

## 2019-03-29 RX ADMIN — OXYCODONE AND ACETAMINOPHEN 2 TABLET(S): 5; 325 TABLET ORAL at 21:51

## 2019-03-29 RX ADMIN — HEPARIN SODIUM 5000 UNIT(S): 5000 INJECTION INTRAVENOUS; SUBCUTANEOUS at 13:33

## 2019-03-29 RX ADMIN — Medication 166.67 MILLIGRAM(S): at 21:37

## 2019-03-29 RX ADMIN — HEPARIN SODIUM 5000 UNIT(S): 5000 INJECTION INTRAVENOUS; SUBCUTANEOUS at 06:05

## 2019-03-29 RX ADMIN — GABAPENTIN 300 MILLIGRAM(S): 400 CAPSULE ORAL at 13:33

## 2019-03-29 NOTE — PROGRESS NOTE ADULT - SUBJECTIVE AND OBJECTIVE BOX
Subjective: 48yFemale with a pmhx of POSTOPERATIVE INFECTION  ^POSTOPERATIVE INFECTION  Handoff  MEWS Score  Herniated disc, cervical  Hypertension  No pertinent past medical history  Postoperative infection, unspecified type, initial encounter  Postoperative infection, unspecified type, initial encounter  Incision and drainage, wound, lumbar  Complex incision and drainage of wound infection  H/O lumbar discectomy  S/P SURGERY PAIN/INFECTION  20    POD # 7    S/P Lumbar Wound Washout    Pt seen and examined at bedside w Dr Christensen.  Pt c/o incisional pain at this time.  Complaining of pain to the left lower back and hip but improved.     Allergies    No Known Allergies    Intolerances        Vital Signs Last 24 Hrs  T(C): 35.6 (29 Mar 2019 05:29), Max: 37.3 (28 Mar 2019 22:05)  T(F): 96.1 (29 Mar 2019 05:29), Max: 99.1 (28 Mar 2019 22:05)  HR: 73 (29 Mar 2019 05:29) (69 - 94)  BP: 120/58 (29 Mar 2019 05:29) (98/54 - 120/58)  BP(mean): --  RR: 18 (29 Mar 2019 05:29) (17 - 18)  SpO2: --      acetaminophen   Tablet .. 650 milliGRAM(s) Oral every 6 hours PRN  cyanocobalamin 1000 MICROGram(s) Oral daily  cyclobenzaprine 10 milliGRAM(s) Oral two times a day PRN  diazepam    Tablet 5 milliGRAM(s) Oral every 8 hours  docusate sodium 100 milliGRAM(s) Oral three times a day  gabapentin 300 milliGRAM(s) Oral every 8 hours  heparin  Injectable 5000 Unit(s) SubCutaneous every 8 hours  ketorolac   Injectable 15 milliGRAM(s) IV Push every 6 hours PRN  labetalol 100 milliGRAM(s) Oral daily  lidocaine   Patch 1 Patch Transdermal every 24 hours  morphine  - Injectable 2 milliGRAM(s) IV Push every 4 hours PRN  multivitamin 1 Tablet(s) Oral daily  ondansetron Injectable 4 milliGRAM(s) IV Push every 6 hours PRN  oxyCODONE    5 mG/acetaminophen 325 mG 2 Tablet(s) Oral every 4 hours PRN  oxyCODONE    5 mG/acetaminophen 325 mG 1 Tablet(s) Oral every 4 hours PRN  pantoprazole    Tablet 40 milliGRAM(s) Oral before breakfast  senna 2 Tablet(s) Oral at bedtime  vancomycin  IVPB 1250 milliGRAM(s) IV Intermittent every 12 hours              Exam:  AAOX3. Verbal function intact  follows commands  Motor: MAEx4   5/5 power in b/l  LE  Sensation intact to fine touch        03-29    141  |  101  |  21<H>  ----------------------------<  99  3.7   |  25  |  1.8<H>    Ca    8.6      29 Mar 2019 06:06        Assessment/Plan: as above  Cr 1.8, repeat bmp 1600  vanco trough 1600  IVF NS@100  Pt/rehab- not a 4a candidate  family not ready to go home, still pain  Ir consult - PICC  d/w attending

## 2019-03-29 NOTE — DIETITIAN INITIAL EVALUATION ADULT. - OTHER INFO
RD assessment as LOS. POD #7, S/P lumbar wound washout. Pt reports good appetite and PO intake, however only eating food brought in from home as she has limited foods here that she is accustomed to. Primarily eats , vegetarian foods. Requests being put on a vegetarian diet here and reviewed available foods she may like such as lentil soup. Pt also requests Ensure Enlive supplement in case unable to receive food from home. NKFA. No recent BM noted.

## 2019-03-30 LAB
ANION GAP SERPL CALC-SCNC: 15 MMOL/L — HIGH (ref 7–14)
BUN SERPL-MCNC: 24 MG/DL — HIGH (ref 10–20)
CALCIUM SERPL-MCNC: 8.3 MG/DL — LOW (ref 8.5–10.1)
CHLORIDE SERPL-SCNC: 101 MMOL/L — SIGNIFICANT CHANGE UP (ref 98–110)
CO2 SERPL-SCNC: 23 MMOL/L — SIGNIFICANT CHANGE UP (ref 17–32)
CREAT SERPL-MCNC: 1.8 MG/DL — HIGH (ref 0.7–1.5)
CULTURE RESULTS: SIGNIFICANT CHANGE UP
GLUCOSE SERPL-MCNC: 110 MG/DL — HIGH (ref 70–99)
POTASSIUM SERPL-MCNC: 3.9 MMOL/L — SIGNIFICANT CHANGE UP (ref 3.5–5)
POTASSIUM SERPL-SCNC: 3.9 MMOL/L — SIGNIFICANT CHANGE UP (ref 3.5–5)
SODIUM SERPL-SCNC: 139 MMOL/L — SIGNIFICANT CHANGE UP (ref 135–146)
SPECIMEN SOURCE: SIGNIFICANT CHANGE UP
VANCOMYCIN FLD-MCNC: 41.6 UG/ML — HIGH (ref 5–10)
VANCOMYCIN TROUGH SERPL-MCNC: 33 UG/ML — HIGH (ref 5–10)

## 2019-03-30 RX ORDER — OXYCODONE AND ACETAMINOPHEN 5; 325 MG/1; MG/1
1 TABLET ORAL EVERY 4 HOURS
Qty: 0 | Refills: 0 | Status: DISCONTINUED | OUTPATIENT
Start: 2019-03-30 | End: 2019-04-05

## 2019-03-30 RX ORDER — OXYCODONE AND ACETAMINOPHEN 5; 325 MG/1; MG/1
2 TABLET ORAL EVERY 4 HOURS
Qty: 0 | Refills: 0 | Status: DISCONTINUED | OUTPATIENT
Start: 2019-03-30 | End: 2019-04-06

## 2019-03-30 RX ORDER — MORPHINE SULFATE 50 MG/1
2 CAPSULE, EXTENDED RELEASE ORAL EVERY 4 HOURS
Qty: 0 | Refills: 0 | Status: DISCONTINUED | OUTPATIENT
Start: 2019-03-30 | End: 2019-04-06

## 2019-03-30 RX ADMIN — Medication 1 TABLET(S): at 12:47

## 2019-03-30 RX ADMIN — HEPARIN SODIUM 5000 UNIT(S): 5000 INJECTION INTRAVENOUS; SUBCUTANEOUS at 13:48

## 2019-03-30 RX ADMIN — OXYCODONE AND ACETAMINOPHEN 2 TABLET(S): 5; 325 TABLET ORAL at 13:47

## 2019-03-30 RX ADMIN — PREGABALIN 1000 MICROGRAM(S): 225 CAPSULE ORAL at 12:47

## 2019-03-30 RX ADMIN — Medication 100 MILLIGRAM(S): at 06:17

## 2019-03-30 RX ADMIN — MORPHINE SULFATE 2 MILLIGRAM(S): 50 CAPSULE, EXTENDED RELEASE ORAL at 14:44

## 2019-03-30 RX ADMIN — Medication 15 MILLIGRAM(S): at 06:31

## 2019-03-30 RX ADMIN — Medication 15 MILLIGRAM(S): at 06:16

## 2019-03-30 RX ADMIN — OXYCODONE AND ACETAMINOPHEN 1 TABLET(S): 5; 325 TABLET ORAL at 17:34

## 2019-03-30 RX ADMIN — GABAPENTIN 300 MILLIGRAM(S): 400 CAPSULE ORAL at 13:48

## 2019-03-30 RX ADMIN — OXYCODONE AND ACETAMINOPHEN 2 TABLET(S): 5; 325 TABLET ORAL at 23:08

## 2019-03-30 RX ADMIN — HEPARIN SODIUM 5000 UNIT(S): 5000 INJECTION INTRAVENOUS; SUBCUTANEOUS at 06:17

## 2019-03-30 RX ADMIN — SENNA PLUS 2 TABLET(S): 8.6 TABLET ORAL at 23:09

## 2019-03-30 RX ADMIN — OXYCODONE AND ACETAMINOPHEN 2 TABLET(S): 5; 325 TABLET ORAL at 23:50

## 2019-03-30 RX ADMIN — GABAPENTIN 300 MILLIGRAM(S): 400 CAPSULE ORAL at 06:17

## 2019-03-30 RX ADMIN — LIDOCAINE 1 PATCH: 4 CREAM TOPICAL at 06:17

## 2019-03-30 RX ADMIN — Medication 166.67 MILLIGRAM(S): at 06:17

## 2019-03-30 RX ADMIN — HEPARIN SODIUM 5000 UNIT(S): 5000 INJECTION INTRAVENOUS; SUBCUTANEOUS at 23:09

## 2019-03-30 RX ADMIN — GABAPENTIN 300 MILLIGRAM(S): 400 CAPSULE ORAL at 23:08

## 2019-03-30 RX ADMIN — PANTOPRAZOLE SODIUM 40 MILLIGRAM(S): 20 TABLET, DELAYED RELEASE ORAL at 06:16

## 2019-03-30 RX ADMIN — Medication 100 MILLIGRAM(S): at 13:48

## 2019-03-30 RX ADMIN — Medication 100 MILLIGRAM(S): at 23:09

## 2019-03-30 NOTE — PROGRESS NOTE ADULT - SUBJECTIVE AND OBJECTIVE BOX
Subjective: 48yFemale with a pmhx of POSTOPERATIVE INFECTION  ^POSTOPERATIVE INFECTION  Handoff  MEWS Score  Herniated disc, cervical  Hypertension  No pertinent past medical history  Postoperative infection, unspecified type, initial encounter  Postoperative infection, unspecified type, initial encounter  Incision and drainage, wound, lumbar  Complex incision and drainage of wound infection  H/O lumbar discectomy  S/P SURGERY PAIN/INFECTION  20      POD # 8    S/P Lumbar Wound Washout    Pt seen and examined at bedside. Pt sts pain to lower back much improved.  Pt sts she no longer has pain radiating down her leg.  Pt c/o feeling overall weakness.  Pt sts she wants to go outside.     Allergies    No Known Allergies    Intolerances        Vital Signs Last 24 Hrs  T(C): 36.5 (30 Mar 2019 05:17), Max: 36.5 (30 Mar 2019 05:17)  T(F): 97.7 (30 Mar 2019 05:17), Max: 97.7 (30 Mar 2019 05:17)  HR: 87 (30 Mar 2019 05:17) (85 - 98)  BP: 113/59 (30 Mar 2019 05:17) (113/59 - 130/63)  BP(mean): --  RR: 17 (30 Mar 2019 05:17) (17 - 19)  SpO2: --      acetaminophen   Tablet .. 650 milliGRAM(s) Oral every 6 hours PRN  cyanocobalamin 1000 MICROGram(s) Oral daily  cyclobenzaprine 10 milliGRAM(s) Oral two times a day PRN  docusate sodium 100 milliGRAM(s) Oral three times a day  gabapentin 300 milliGRAM(s) Oral every 8 hours  heparin  Injectable 5000 Unit(s) SubCutaneous every 8 hours  labetalol 100 milliGRAM(s) Oral daily  lidocaine   Patch 1 Patch Transdermal every 24 hours  multivitamin 1 Tablet(s) Oral daily  ondansetron Injectable 4 milliGRAM(s) IV Push every 6 hours PRN  pantoprazole    Tablet 40 milliGRAM(s) Oral before breakfast  senna 2 Tablet(s) Oral at bedtime  sodium chloride 0.9%. 1000 milliLiter(s) IV Continuous <Continuous>  vancomycin  IVPB 1250 milliGRAM(s) IV Intermittent every 12 hours            Exam:  AAOX3. Verbal function intact  follows commands  Motor: MAEx4  b/l LE strength 5/5  Sensation: intact b/l and equal  wound w sutures intact, no drainage        CBC Full  -  ( 29 Mar 2019 18:48 )  WBC Count : 11.67 K/uL  RBC Count : 4.00 M/uL  Hemoglobin : 11.9 g/dL  Hematocrit : 36.2 %  Platelet Count - Automated : 380 K/uL  Mean Cell Volume : 90.5 fL  Mean Cell Hemoglobin : 29.8 pg  Mean Cell Hemoglobin Concentration : 32.9 g/dL  Auto Neutrophil # : x  Auto Lymphocyte # : x  Auto Monocyte # : x  Auto Eosinophil # : x  Auto Basophil # : x  Auto Neutrophil % : x  Auto Lymphocyte % : x  Auto Monocyte % : x  Auto Eosinophil % : x  Auto Basophil % : x    03-30    139  |  101  |  24<H>  ----------------------------<  110<H>  3.9   |  23  |  1.8<H>    Ca    8.3<L>      30 Mar 2019 06:15        Assessment/Plan: as above  d/c Toradol  Hold Vancomycin doses until Vanco level done (RN informed and order written)  PICC line for antibx  pain control  PT/rehab  will discuss w attg

## 2019-03-31 LAB
ANION GAP SERPL CALC-SCNC: 13 MMOL/L — SIGNIFICANT CHANGE UP (ref 7–14)
BUN SERPL-MCNC: 21 MG/DL — HIGH (ref 10–20)
CALCIUM SERPL-MCNC: 8.9 MG/DL — SIGNIFICANT CHANGE UP (ref 8.5–10.1)
CHLORIDE SERPL-SCNC: 102 MMOL/L — SIGNIFICANT CHANGE UP (ref 98–110)
CO2 SERPL-SCNC: 25 MMOL/L — SIGNIFICANT CHANGE UP (ref 17–32)
CREAT SERPL-MCNC: 1.9 MG/DL — HIGH (ref 0.7–1.5)
GLUCOSE SERPL-MCNC: 106 MG/DL — HIGH (ref 70–99)
MAGNESIUM SERPL-MCNC: 1.9 MG/DL — SIGNIFICANT CHANGE UP (ref 1.8–2.4)
POTASSIUM SERPL-MCNC: 4.7 MMOL/L — SIGNIFICANT CHANGE UP (ref 3.5–5)
POTASSIUM SERPL-SCNC: 4.7 MMOL/L — SIGNIFICANT CHANGE UP (ref 3.5–5)
SODIUM SERPL-SCNC: 140 MMOL/L — SIGNIFICANT CHANGE UP (ref 135–146)
VANCOMYCIN FLD-MCNC: 20.7 UG/ML — HIGH (ref 5–10)

## 2019-03-31 RX ORDER — MAGNESIUM SULFATE 500 MG/ML
1 VIAL (ML) INJECTION ONCE
Qty: 0 | Refills: 0 | Status: COMPLETED | OUTPATIENT
Start: 2019-03-31 | End: 2019-03-31

## 2019-03-31 RX ORDER — POLYETHYLENE GLYCOL 3350 17 G/17G
17 POWDER, FOR SOLUTION ORAL DAILY
Qty: 0 | Refills: 0 | Status: DISCONTINUED | OUTPATIENT
Start: 2019-03-31 | End: 2019-04-10

## 2019-03-31 RX ADMIN — MORPHINE SULFATE 2 MILLIGRAM(S): 50 CAPSULE, EXTENDED RELEASE ORAL at 07:26

## 2019-03-31 RX ADMIN — PANTOPRAZOLE SODIUM 40 MILLIGRAM(S): 20 TABLET, DELAYED RELEASE ORAL at 06:02

## 2019-03-31 RX ADMIN — OXYCODONE AND ACETAMINOPHEN 2 TABLET(S): 5; 325 TABLET ORAL at 15:32

## 2019-03-31 RX ADMIN — GABAPENTIN 300 MILLIGRAM(S): 400 CAPSULE ORAL at 06:02

## 2019-03-31 RX ADMIN — Medication 100 MILLIGRAM(S): at 13:13

## 2019-03-31 RX ADMIN — GABAPENTIN 300 MILLIGRAM(S): 400 CAPSULE ORAL at 22:07

## 2019-03-31 RX ADMIN — OXYCODONE AND ACETAMINOPHEN 2 TABLET(S): 5; 325 TABLET ORAL at 16:30

## 2019-03-31 RX ADMIN — HEPARIN SODIUM 5000 UNIT(S): 5000 INJECTION INTRAVENOUS; SUBCUTANEOUS at 22:06

## 2019-03-31 RX ADMIN — LIDOCAINE 1 PATCH: 4 CREAM TOPICAL at 23:11

## 2019-03-31 RX ADMIN — Medication 100 MILLIGRAM(S): at 22:07

## 2019-03-31 RX ADMIN — OXYCODONE AND ACETAMINOPHEN 2 TABLET(S): 5; 325 TABLET ORAL at 06:03

## 2019-03-31 RX ADMIN — HEPARIN SODIUM 5000 UNIT(S): 5000 INJECTION INTRAVENOUS; SUBCUTANEOUS at 06:02

## 2019-03-31 RX ADMIN — POLYETHYLENE GLYCOL 3350 17 GRAM(S): 17 POWDER, FOR SOLUTION ORAL at 11:18

## 2019-03-31 RX ADMIN — LIDOCAINE 1 PATCH: 4 CREAM TOPICAL at 11:19

## 2019-03-31 RX ADMIN — OXYCODONE AND ACETAMINOPHEN 2 TABLET(S): 5; 325 TABLET ORAL at 23:10

## 2019-03-31 RX ADMIN — MORPHINE SULFATE 2 MILLIGRAM(S): 50 CAPSULE, EXTENDED RELEASE ORAL at 07:42

## 2019-03-31 RX ADMIN — Medication 100 GRAM(S): at 11:18

## 2019-03-31 RX ADMIN — SENNA PLUS 2 TABLET(S): 8.6 TABLET ORAL at 22:07

## 2019-03-31 RX ADMIN — Medication 1 TABLET(S): at 11:20

## 2019-03-31 RX ADMIN — OXYCODONE AND ACETAMINOPHEN 2 TABLET(S): 5; 325 TABLET ORAL at 22:08

## 2019-03-31 RX ADMIN — Medication 100 MILLIGRAM(S): at 06:02

## 2019-03-31 RX ADMIN — GABAPENTIN 300 MILLIGRAM(S): 400 CAPSULE ORAL at 13:13

## 2019-03-31 RX ADMIN — HEPARIN SODIUM 5000 UNIT(S): 5000 INJECTION INTRAVENOUS; SUBCUTANEOUS at 13:13

## 2019-03-31 RX ADMIN — PREGABALIN 1000 MICROGRAM(S): 225 CAPSULE ORAL at 11:20

## 2019-03-31 RX ADMIN — Medication 650 MILLIGRAM(S): at 20:03

## 2019-03-31 NOTE — PROGRESS NOTE ADULT - SUBJECTIVE AND OBJECTIVE BOX
Subjective: 48yFemale with a pmhx of POSTOPERATIVE INFECTION  ^POSTOPERATIVE INFECTION  Handoff  MEWS Score  Herniated disc, cervical  Hypertension  No pertinent past medical history  Postoperative infection, unspecified type, initial encounter  Postoperative infection, unspecified type, initial encounter  Incision and drainage, wound, lumbar  Complex incision and drainage of wound infection  H/O lumbar discectomy  S/P SURGERY PAIN/INFECTION  20      POD # 9    S/P Lumbar Wound Washout    Pt seen and examined at bedside. Pt sts pain she has pain to left lower back.  Pt c/o feeling overall tiredness.  Pt sts she wants to take a shower.     Allergies    No Known Allergies    Intolerances        Vital Signs Last 24 Hrs  T(C): 37.2 (31 Mar 2019 05:31), Max: 37.2 (31 Mar 2019 05:31)  T(F): 98.9 (31 Mar 2019 05:31), Max: 98.9 (31 Mar 2019 05:31)  HR: 95 (31 Mar 2019 05:31) (72 - 95)  BP: 149/69 (31 Mar 2019 05:31) (107/52 - 149/69)  BP(mean): --  RR: 16 (31 Mar 2019 05:31) (16 - 18)  SpO2: --      acetaminophen   Tablet .. 650 milliGRAM(s) Oral every 6 hours PRN  cyanocobalamin 1000 MICROGram(s) Oral daily  cyclobenzaprine 10 milliGRAM(s) Oral two times a day PRN  docusate sodium 100 milliGRAM(s) Oral three times a day  gabapentin 300 milliGRAM(s) Oral every 8 hours  heparin  Injectable 5000 Unit(s) SubCutaneous every 8 hours  labetalol 100 milliGRAM(s) Oral daily  lidocaine   Patch 1 Patch Transdermal every 24 hours  morphine  - Injectable 2 milliGRAM(s) IV Push every 4 hours PRN  multivitamin 1 Tablet(s) Oral daily  ondansetron Injectable 4 milliGRAM(s) IV Push every 6 hours PRN  oxyCODONE    5 mG/acetaminophen 325 mG 1 Tablet(s) Oral every 4 hours PRN  oxyCODONE    5 mG/acetaminophen 325 mG 2 Tablet(s) Oral every 4 hours PRN  pantoprazole    Tablet 40 milliGRAM(s) Oral before breakfast  senna 2 Tablet(s) Oral at bedtime  sodium chloride 0.9%. 1000 milliLiter(s) IV Continuous <Continuous>        Exam:  AAOX3. Verbal function intact  follows commands  Motor: MAEx4  b/l LE strength 5/5  Sensation: intact b/l   wound w sutures intact, no drainage      CBC Full  -  ( 29 Mar 2019 18:48 )  WBC Count : 11.67 K/uL  RBC Count : 4.00 M/uL  Hemoglobin : 11.9 g/dL  Hematocrit : 36.2 %  Platelet Count - Automated : 380 K/uL  Mean Cell Volume : 90.5 fL  Mean Cell Hemoglobin : 29.8 pg  Mean Cell Hemoglobin Concentration : 32.9 g/dL  Auto Neutrophil # : x  Auto Lymphocyte # : x  Auto Monocyte # : x  Auto Eosinophil # : x  Auto Basophil # : x  Auto Neutrophil % : x  Auto Lymphocyte % : x  Auto Monocyte % : x  Auto Eosinophil % : x  Auto Basophil % : x    03-31    140  |  102  |  21<H>  ----------------------------<  106<H>  4.7   |  25  |  1.9<H>    Ca    8.9      31 Mar 2019 05:58  Mg     1.9     03-31        Assessment/Plan: as above  PT/rehab  f/u Vanco level today  resume Vanco once level normalizes  labs in AM- CBC, BMP, Mg, random Vanco level  pain control  will discuss w attg

## 2019-04-01 ENCOUNTER — TRANSCRIPTION ENCOUNTER (OUTPATIENT)
Age: 49
End: 2019-04-01

## 2019-04-01 LAB
ANION GAP SERPL CALC-SCNC: 11 MMOL/L — SIGNIFICANT CHANGE UP (ref 7–14)
BUN SERPL-MCNC: 22 MG/DL — HIGH (ref 10–20)
CALCIUM SERPL-MCNC: 8.6 MG/DL — SIGNIFICANT CHANGE UP (ref 8.5–10.1)
CHLORIDE SERPL-SCNC: 101 MMOL/L — SIGNIFICANT CHANGE UP (ref 98–110)
CO2 SERPL-SCNC: 26 MMOL/L — SIGNIFICANT CHANGE UP (ref 17–32)
CREAT SERPL-MCNC: 1.8 MG/DL — HIGH (ref 0.7–1.5)
CULTURE RESULTS: SIGNIFICANT CHANGE UP
CULTURE RESULTS: SIGNIFICANT CHANGE UP
GLUCOSE SERPL-MCNC: 108 MG/DL — HIGH (ref 70–99)
HCT VFR BLD CALC: 31 % — LOW (ref 37–47)
HGB BLD-MCNC: 10.2 G/DL — LOW (ref 12–16)
MAGNESIUM SERPL-MCNC: 2 MG/DL — SIGNIFICANT CHANGE UP (ref 1.8–2.4)
MCHC RBC-ENTMCNC: 29.6 PG — SIGNIFICANT CHANGE UP (ref 27–31)
MCHC RBC-ENTMCNC: 32.9 G/DL — SIGNIFICANT CHANGE UP (ref 32–37)
MCV RBC AUTO: 89.9 FL — SIGNIFICANT CHANGE UP (ref 81–99)
NRBC # BLD: 0 /100 WBCS — SIGNIFICANT CHANGE UP (ref 0–0)
PLATELET # BLD AUTO: 350 K/UL — SIGNIFICANT CHANGE UP (ref 130–400)
POTASSIUM SERPL-MCNC: 4.6 MMOL/L — SIGNIFICANT CHANGE UP (ref 3.5–5)
POTASSIUM SERPL-SCNC: 4.6 MMOL/L — SIGNIFICANT CHANGE UP (ref 3.5–5)
RBC # BLD: 3.45 M/UL — LOW (ref 4.2–5.4)
RBC # FLD: 12.7 % — SIGNIFICANT CHANGE UP (ref 11.5–14.5)
SODIUM SERPL-SCNC: 138 MMOL/L — SIGNIFICANT CHANGE UP (ref 135–146)
SPECIMEN SOURCE: SIGNIFICANT CHANGE UP
SPECIMEN SOURCE: SIGNIFICANT CHANGE UP
WBC # BLD: 6.83 K/UL — SIGNIFICANT CHANGE UP (ref 4.8–10.8)
WBC # FLD AUTO: 6.83 K/UL — SIGNIFICANT CHANGE UP (ref 4.8–10.8)

## 2019-04-01 PROCEDURE — 36573 INSJ PICC RS&I 5 YR+: CPT

## 2019-04-01 PROCEDURE — 71045 X-RAY EXAM CHEST 1 VIEW: CPT | Mod: 26

## 2019-04-01 RX ORDER — DIPHENHYDRAMINE HCL 50 MG
50 CAPSULE ORAL ONCE
Qty: 0 | Refills: 0 | Status: COMPLETED | OUTPATIENT
Start: 2019-04-01 | End: 2019-04-01

## 2019-04-01 RX ORDER — BACITRACIN ZINC 500 UNIT/G
1 OINTMENT IN PACKET (EA) TOPICAL DAILY
Qty: 0 | Refills: 0 | Status: DISCONTINUED | OUTPATIENT
Start: 2019-04-01 | End: 2019-04-10

## 2019-04-01 RX ADMIN — PREGABALIN 1000 MICROGRAM(S): 225 CAPSULE ORAL at 12:10

## 2019-04-01 RX ADMIN — Medication 100 MILLIGRAM(S): at 06:17

## 2019-04-01 RX ADMIN — GABAPENTIN 300 MILLIGRAM(S): 400 CAPSULE ORAL at 22:08

## 2019-04-01 RX ADMIN — LIDOCAINE 1 PATCH: 4 CREAM TOPICAL at 12:08

## 2019-04-01 RX ADMIN — Medication 50 MILLIGRAM(S): at 22:08

## 2019-04-01 RX ADMIN — HEPARIN SODIUM 5000 UNIT(S): 5000 INJECTION INTRAVENOUS; SUBCUTANEOUS at 12:09

## 2019-04-01 RX ADMIN — Medication 1 TABLET(S): at 12:08

## 2019-04-01 RX ADMIN — Medication 100 MILLIGRAM(S): at 12:08

## 2019-04-01 RX ADMIN — GABAPENTIN 300 MILLIGRAM(S): 400 CAPSULE ORAL at 12:08

## 2019-04-01 RX ADMIN — OXYCODONE AND ACETAMINOPHEN 2 TABLET(S): 5; 325 TABLET ORAL at 22:06

## 2019-04-01 RX ADMIN — GABAPENTIN 300 MILLIGRAM(S): 400 CAPSULE ORAL at 06:17

## 2019-04-01 RX ADMIN — HEPARIN SODIUM 5000 UNIT(S): 5000 INJECTION INTRAVENOUS; SUBCUTANEOUS at 06:17

## 2019-04-01 RX ADMIN — MORPHINE SULFATE 2 MILLIGRAM(S): 50 CAPSULE, EXTENDED RELEASE ORAL at 12:38

## 2019-04-01 RX ADMIN — MORPHINE SULFATE 2 MILLIGRAM(S): 50 CAPSULE, EXTENDED RELEASE ORAL at 06:42

## 2019-04-01 RX ADMIN — CYCLOBENZAPRINE HYDROCHLORIDE 10 MILLIGRAM(S): 10 TABLET, FILM COATED ORAL at 12:08

## 2019-04-01 RX ADMIN — PANTOPRAZOLE SODIUM 40 MILLIGRAM(S): 20 TABLET, DELAYED RELEASE ORAL at 06:17

## 2019-04-01 RX ADMIN — SENNA PLUS 2 TABLET(S): 8.6 TABLET ORAL at 22:08

## 2019-04-01 RX ADMIN — HEPARIN SODIUM 5000 UNIT(S): 5000 INJECTION INTRAVENOUS; SUBCUTANEOUS at 22:08

## 2019-04-01 RX ADMIN — POLYETHYLENE GLYCOL 3350 17 GRAM(S): 17 POWDER, FOR SOLUTION ORAL at 12:09

## 2019-04-01 RX ADMIN — Medication 100 MILLIGRAM(S): at 22:08

## 2019-04-01 RX ADMIN — OXYCODONE AND ACETAMINOPHEN 2 TABLET(S): 5; 325 TABLET ORAL at 17:31

## 2019-04-01 NOTE — DISCHARGE NOTE NURSING/CASE MANAGEMENT/SOCIAL WORK - NSDCDPATPORTLINK_GEN_ALL_CORE
You can access the [facility], offered by [facility], by registering with the following website: [email or web address]

## 2019-04-01 NOTE — PROGRESS NOTE ADULT - SUBJECTIVE AND OBJECTIVE BOX
RN called to see pt, today she developed a maculopapular rash over upper extremities, chest, back and buttocks, non itchy. Rash is red, slightly raised and maculopapular in appearance. Pt not in any distress, pt didn't notice rash until PCA noticed it. Pt did not receive any new medication today and did not eat any new foods. Benadryl 50 mg po ordered, will reassess. UA and cxr ordered as per Dr. Christensen for low grade temp evaluation

## 2019-04-01 NOTE — PROCEDURE NOTE - NSPOSTCAREGUIDE_GEN_A_CORE
Instructed patient/caregiver to follow-up with primary care physician/Care for catheter as per unit/ICU protocols/Keep the cast/splint/dressing clean and dry/Verbal/written post procedure instructions were given to patient/caregiver/Instructed patient/caregiver regarding signs and symptoms of infection

## 2019-04-01 NOTE — PROGRESS NOTE ADULT - ASSESSMENT
· Assessment		  48yF    Herniated disc, cervical  Hypertension  s/p 2/2019 laminectomy    Admitted with f/c POSTOPERATIVE INFECTION with incisional subcutaneous abscess  Sepsis ruled out on admission  WBC 6.9  S/p OR 3/22  Bcx 322, 23 , 24  with coNS,   Bcx 3/26 NTD  WCx Staph species  Vanco level 25    - Vanc level 20 this AM  - Continue to hold vanc, check level 4/2 AM  - If <20, start Vanc 1.25 q24h (once Cr normalizes, will increase to 1.25 q12h)  - Trend Cr  - Cleared for PICC  - Will need 4-6 week IV abx   - ID follow-up [id number] [phone]

## 2019-04-01 NOTE — PROGRESS NOTE ADULT - SUBJECTIVE AND OBJECTIVE BOX
Subjective: 48yFemale with a pmhx of POSTOPERATIVE INFECTION  ^POSTOPERATIVE INFECTION  Handoff  MEWS Score  Herniated disc, cervical  Hypertension  No pertinent past medical history  Postoperative infection, unspecified type, initial encounter  Postoperative infection, unspecified type, initial encounter  Incision and drainage, wound, lumbar  Complex incision and drainage of wound infection  H/O lumbar discectomy  S/P SURGERY PAIN/INFECTION  20    POD # 9    S/P Lumbar Wound Washout    Pt seen and examined at bedside. Pt sts pain she has pain to left lower back.  Pt c/o feeling overall tiredness.        Allergies    No Known Allergies    Intolerances        Vital Signs Last 24 Hrs  T(C): 35.9 (01 Apr 2019 06:48), Max: 37.7 (31 Mar 2019 14:37)  T(F): 96.6 (01 Apr 2019 06:48), Max: 99.8 (31 Mar 2019 14:37)  HR: 70 (01 Apr 2019 06:48) (70 - 94)  BP: 141/81 (01 Apr 2019 06:48) (84/46 - 141/81)  BP(mean): --  RR: 18 (01 Apr 2019 06:48) (18 - 18)  SpO2: --      acetaminophen   Tablet .. 650 milliGRAM(s) Oral every 6 hours PRN  bisacodyl Suppository 10 milliGRAM(s) Rectal daily PRN  cyanocobalamin 1000 MICROGram(s) Oral daily  cyclobenzaprine 10 milliGRAM(s) Oral two times a day PRN  docusate sodium 100 milliGRAM(s) Oral three times a day  gabapentin 300 milliGRAM(s) Oral every 8 hours  heparin  Injectable 5000 Unit(s) SubCutaneous every 8 hours  labetalol 100 milliGRAM(s) Oral daily  lidocaine   Patch 1 Patch Transdermal every 24 hours  morphine  - Injectable 2 milliGRAM(s) IV Push every 4 hours PRN  multivitamin 1 Tablet(s) Oral daily  ondansetron Injectable 4 milliGRAM(s) IV Push every 6 hours PRN  oxyCODONE    5 mG/acetaminophen 325 mG 1 Tablet(s) Oral every 4 hours PRN  oxyCODONE    5 mG/acetaminophen 325 mG 2 Tablet(s) Oral every 4 hours PRN  pantoprazole    Tablet 40 milliGRAM(s) Oral before breakfast  polyethylene glycol 3350 17 Gram(s) Oral daily  senna 2 Tablet(s) Oral at bedtime  sodium chloride 0.9%. 1000 milliLiter(s) IV Continuous <Continuous>            Exam:  AAOX3. Verbal function intact  follows commands  Motor: MAEx4  b/l LE strength 5/5  Sensation: intact b/l   wound w sutures intact, no drainage        CBC Full  -  ( 01 Apr 2019 05:47 )  WBC Count : 6.83 K/uL  RBC Count : 3.45 M/uL  Hemoglobin : 10.2 g/dL  Hematocrit : 31.0 %  Platelet Count - Automated : 350 K/uL  Mean Cell Volume : 89.9 fL  Mean Cell Hemoglobin : 29.6 pg  Mean Cell Hemoglobin Concentration : 32.9 g/dL      04-01    138  |  101  |  22<H>  ----------------------------<  108<H>  4.6   |  26  |  1.8<H>    Ca    8.6      01 Apr 2019 05:47  Mg     2.0     04-01              Wound:  healing well        Assessment/Plan: as above  PICC line  am labs  vanco trough for am   cont to follow CR  if level 4/2 <20 will restart vanco 1.25 q24h  if creatnine goes back to baseline, will restart vanco at 1.25 q12h  cont PT/rehab  d/w attending

## 2019-04-01 NOTE — PROGRESS NOTE ADULT - SUBJECTIVE AND OBJECTIVE BOX
KAMI, DANIEL  48y, Female      OVERNIGHT EVENTS:  Vancomycin Level, Random: 20.7:  (03.31.19 @ 11:59)  cr downtrending      ROS negative except as per above    VITALS:  T(F): 96.6, Max: 99.8 (03-31-19 @ 14:37)  HR: 70  BP: 141/81  RR: 18Vital Signs Last 24 Hrs  T(C): 35.9 (01 Apr 2019 06:48), Max: 37.7 (31 Mar 2019 14:37)  T(F): 96.6 (01 Apr 2019 06:48), Max: 99.8 (31 Mar 2019 14:37)  HR: 70 (01 Apr 2019 06:48) (70 - 94)  BP: 141/81 (01 Apr 2019 06:48) (84/46 - 141/81)  BP(mean): --  RR: 18 (01 Apr 2019 06:48) (18 - 18)  SpO2: --    PHYSICAL EXAM  Gen: Awake and alert, non-toxic appearing, NAD  HEENT: NCAT. EOMI. MMM.   Neck: Supple, no cervical LAD  CV: RRR, no murmurs  Lungs: CTAB, no w/r/r  Abd: Soft. NTND  Extr: wwp, no edema  Skin: no rash  Neuro: No focal deficits  Lines: clean      TESTS & MEASUREMENTS:                        10.2   6.83  )-----------( 350      ( 01 Apr 2019 05:47 )             31.0     04-01    138  |  101  |  22<H>  ----------------------------<  108<H>  4.6   |  26  |  1.8<H>    Ca    8.6      01 Apr 2019 05:47  Mg     2.0     04-01          Culture - Blood (collected 03-29-19 @ 06:06)  Source: .Blood None  Preliminary Report (03-30-19 @ 14:00):    No growth to date.    Culture - Blood (collected 03-28-19 @ 05:51)  Source: .Blood None  Preliminary Report (03-29-19 @ 15:01):    No growth to date.    Culture - Blood (collected 03-27-19 @ 06:32)  Source: .Blood None  Preliminary Report (03-28-19 @ 14:00):    No growth to date.    Culture - Blood (collected 03-26-19 @ 19:56)  Source: .Blood None  Final Report (04-01-19 @ 02:18):    No growth at 5 days.          RADIOLOGY & ADDITIONAL TESTS:    ANTIBIOTICS:  cefepime   IVPB   100 mL/Hr IV Intermittent (03-25-19 @ 05:47)   100 mL/Hr IV Intermittent (03-24-19 @ 22:09)   100 mL/Hr IV Intermittent (03-24-19 @ 14:09)   100 mL/Hr IV Intermittent (03-24-19 @ 05:46)   100 mL/Hr IV Intermittent (03-23-19 @ 21:40)   100 mL/Hr IV Intermittent (03-23-19 @ 13:43)   100 mL/Hr IV Intermittent (03-23-19 @ 05:50)   100 mL/Hr IV Intermittent (03-23-19 @ 01:03)    vancomycin  IVPB   166.67 mL/Hr IV Intermittent (03-30-19 @ 06:17)   166.67 mL/Hr IV Intermittent (03-29-19 @ 21:37)    vancomycin  IVPB   166.67 mL/Hr IV Intermittent (03-28-19 @ 06:03)   166.67 mL/Hr IV Intermittent (03-27-19 @ 21:30)   166.67 mL/Hr IV Intermittent (03-27-19 @ 14:15)   166.67 mL/Hr IV Intermittent (03-27-19 @ 05:12)   166.67 mL/Hr IV Intermittent (03-26-19 @ 21:53)   166.67 mL/Hr IV Intermittent (03-26-19 @ 16:59)    vancomycin  IVPB   250 mL/Hr IV Intermittent (03-26-19 @ 06:24)   250 mL/Hr IV Intermittent (03-25-19 @ 22:08)   250 mL/Hr IV Intermittent (03-25-19 @ 05:51)   250 mL/Hr IV Intermittent (03-24-19 @ 17:08)   250 mL/Hr IV Intermittent (03-24-19 @ 06:29)   250 mL/Hr IV Intermittent (03-23-19 @ 17:14)   250 mL/Hr IV Intermittent (03-23-19 @ 05:50)

## 2019-04-02 LAB
ALBUMIN SERPL ELPH-MCNC: 2.9 G/DL — LOW (ref 3.5–5.2)
ALP SERPL-CCNC: 197 U/L — HIGH (ref 30–115)
ALT FLD-CCNC: 80 U/L — HIGH (ref 0–41)
ANION GAP SERPL CALC-SCNC: 15 MMOL/L — HIGH (ref 7–14)
APPEARANCE UR: ABNORMAL
AST SERPL-CCNC: 39 U/L — SIGNIFICANT CHANGE UP (ref 0–41)
BASOPHILS # BLD AUTO: 0.03 K/UL — SIGNIFICANT CHANGE UP (ref 0–0.2)
BASOPHILS NFR BLD AUTO: 0.4 % — SIGNIFICANT CHANGE UP (ref 0–1)
BILIRUB DIRECT SERPL-MCNC: <0.2 MG/DL — SIGNIFICANT CHANGE UP (ref 0–0.2)
BILIRUB INDIRECT FLD-MCNC: SIGNIFICANT CHANGE UP MG/DL (ref 0.2–1.2)
BILIRUB SERPL-MCNC: <0.2 MG/DL — SIGNIFICANT CHANGE UP (ref 0.2–1.2)
BILIRUB UR-MCNC: NEGATIVE — SIGNIFICANT CHANGE UP
BUN SERPL-MCNC: 22 MG/DL — HIGH (ref 10–20)
CALCIUM SERPL-MCNC: 8.5 MG/DL — SIGNIFICANT CHANGE UP (ref 8.5–10.1)
CHLORIDE SERPL-SCNC: 99 MMOL/L — SIGNIFICANT CHANGE UP (ref 98–110)
CO2 SERPL-SCNC: 26 MMOL/L — SIGNIFICANT CHANGE UP (ref 17–32)
COLOR SPEC: YELLOW — SIGNIFICANT CHANGE UP
CREAT SERPL-MCNC: 2 MG/DL — HIGH (ref 0.7–1.5)
CULTURE RESULTS: SIGNIFICANT CHANGE UP
DIFF PNL FLD: ABNORMAL
EOSINOPHIL # BLD AUTO: 0.2 K/UL — SIGNIFICANT CHANGE UP (ref 0–0.7)
EOSINOPHIL NFR BLD AUTO: 2.5 % — SIGNIFICANT CHANGE UP (ref 0–8)
EPI CELLS # UR: ABNORMAL /HPF
GLUCOSE SERPL-MCNC: 111 MG/DL — HIGH (ref 70–99)
GLUCOSE UR QL: NEGATIVE MG/DL — SIGNIFICANT CHANGE UP
GRAN CASTS # UR COMP ASSIST: ABNORMAL /LPF
HCT VFR BLD CALC: 32.8 % — LOW (ref 37–47)
HCT VFR BLD CALC: 33.2 % — LOW (ref 37–47)
HGB BLD-MCNC: 10.7 G/DL — LOW (ref 12–16)
HGB BLD-MCNC: 10.8 G/DL — LOW (ref 12–16)
IMM GRANULOCYTES NFR BLD AUTO: 0.9 % — HIGH (ref 0.1–0.3)
KETONES UR-MCNC: NEGATIVE — SIGNIFICANT CHANGE UP
LEUKOCYTE ESTERASE UR-ACNC: NEGATIVE — SIGNIFICANT CHANGE UP
LYMPHOCYTES # BLD AUTO: 2.31 K/UL — SIGNIFICANT CHANGE UP (ref 1.2–3.4)
LYMPHOCYTES # BLD AUTO: 29.2 % — SIGNIFICANT CHANGE UP (ref 20.5–51.1)
MAGNESIUM SERPL-MCNC: 1.8 MG/DL — SIGNIFICANT CHANGE UP (ref 1.8–2.4)
MCHC RBC-ENTMCNC: 29.3 PG — SIGNIFICANT CHANGE UP (ref 27–31)
MCHC RBC-ENTMCNC: 29.6 PG — SIGNIFICANT CHANGE UP (ref 27–31)
MCHC RBC-ENTMCNC: 32.5 G/DL — SIGNIFICANT CHANGE UP (ref 32–37)
MCHC RBC-ENTMCNC: 32.6 G/DL — SIGNIFICANT CHANGE UP (ref 32–37)
MCV RBC AUTO: 90 FL — SIGNIFICANT CHANGE UP (ref 81–99)
MCV RBC AUTO: 90.9 FL — SIGNIFICANT CHANGE UP (ref 81–99)
MONOCYTES # BLD AUTO: 1.13 K/UL — HIGH (ref 0.1–0.6)
MONOCYTES NFR BLD AUTO: 14.3 % — HIGH (ref 1.7–9.3)
NEUTROPHILS # BLD AUTO: 4.16 K/UL — SIGNIFICANT CHANGE UP (ref 1.4–6.5)
NEUTROPHILS NFR BLD AUTO: 52.7 % — SIGNIFICANT CHANGE UP (ref 42.2–75.2)
NITRITE UR-MCNC: NEGATIVE — SIGNIFICANT CHANGE UP
NRBC # BLD: 0 /100 WBCS — SIGNIFICANT CHANGE UP (ref 0–0)
NRBC # BLD: 0 /100 WBCS — SIGNIFICANT CHANGE UP (ref 0–0)
PH UR: 6.5 — SIGNIFICANT CHANGE UP (ref 5–8)
PHOSPHATE SERPL-MCNC: 5.9 MG/DL — HIGH (ref 2.1–4.9)
PLATELET # BLD AUTO: 401 K/UL — HIGH (ref 130–400)
PLATELET # BLD AUTO: 415 K/UL — HIGH (ref 130–400)
POTASSIUM SERPL-MCNC: 4.9 MMOL/L — SIGNIFICANT CHANGE UP (ref 3.5–5)
POTASSIUM SERPL-SCNC: 4.9 MMOL/L — SIGNIFICANT CHANGE UP (ref 3.5–5)
PROT SERPL-MCNC: 5.8 G/DL — LOW (ref 6–8)
PROT UR-MCNC: ABNORMAL MG/DL
RBC # BLD: 3.61 M/UL — LOW (ref 4.2–5.4)
RBC # BLD: 3.69 M/UL — LOW (ref 4.2–5.4)
RBC # FLD: 12.8 % — SIGNIFICANT CHANGE UP (ref 11.5–14.5)
RBC # FLD: 13 % — SIGNIFICANT CHANGE UP (ref 11.5–14.5)
RBC CASTS # UR COMP ASSIST: SIGNIFICANT CHANGE UP /HPF
SODIUM SERPL-SCNC: 140 MMOL/L — SIGNIFICANT CHANGE UP (ref 135–146)
SP GR SPEC: 1.01 — SIGNIFICANT CHANGE UP (ref 1.01–1.03)
SPECIMEN SOURCE: SIGNIFICANT CHANGE UP
UROBILINOGEN FLD QL: 0.2 MG/DL — SIGNIFICANT CHANGE UP (ref 0.2–0.2)
VANCOMYCIN FLD-MCNC: 7.4 UG/ML — SIGNIFICANT CHANGE UP (ref 5–10)
WBC # BLD: 7.77 K/UL — SIGNIFICANT CHANGE UP (ref 4.8–10.8)
WBC # BLD: 7.9 K/UL — SIGNIFICANT CHANGE UP (ref 4.8–10.8)
WBC # FLD AUTO: 7.77 K/UL — SIGNIFICANT CHANGE UP (ref 4.8–10.8)
WBC # FLD AUTO: 7.9 K/UL — SIGNIFICANT CHANGE UP (ref 4.8–10.8)
WBC UR QL: ABNORMAL /HPF

## 2019-04-02 RX ORDER — VANCOMYCIN HCL 1 G
1250 VIAL (EA) INTRAVENOUS ONCE
Qty: 0 | Refills: 0 | Status: COMPLETED | OUTPATIENT
Start: 2019-04-02 | End: 2019-04-02

## 2019-04-02 RX ADMIN — PANTOPRAZOLE SODIUM 40 MILLIGRAM(S): 20 TABLET, DELAYED RELEASE ORAL at 07:02

## 2019-04-02 RX ADMIN — OXYCODONE AND ACETAMINOPHEN 2 TABLET(S): 5; 325 TABLET ORAL at 18:49

## 2019-04-02 RX ADMIN — OXYCODONE AND ACETAMINOPHEN 2 TABLET(S): 5; 325 TABLET ORAL at 16:47

## 2019-04-02 RX ADMIN — HEPARIN SODIUM 5000 UNIT(S): 5000 INJECTION INTRAVENOUS; SUBCUTANEOUS at 21:27

## 2019-04-02 RX ADMIN — SENNA PLUS 2 TABLET(S): 8.6 TABLET ORAL at 21:26

## 2019-04-02 RX ADMIN — OXYCODONE AND ACETAMINOPHEN 2 TABLET(S): 5; 325 TABLET ORAL at 08:18

## 2019-04-02 RX ADMIN — Medication 100 MILLIGRAM(S): at 13:51

## 2019-04-02 RX ADMIN — PREGABALIN 1000 MICROGRAM(S): 225 CAPSULE ORAL at 11:59

## 2019-04-02 RX ADMIN — Medication 1 APPLICATION(S): at 11:59

## 2019-04-02 RX ADMIN — LIDOCAINE 1 PATCH: 4 CREAM TOPICAL at 18:59

## 2019-04-02 RX ADMIN — Medication 100 MILLIGRAM(S): at 21:26

## 2019-04-02 RX ADMIN — HEPARIN SODIUM 5000 UNIT(S): 5000 INJECTION INTRAVENOUS; SUBCUTANEOUS at 13:51

## 2019-04-02 RX ADMIN — Medication 166.67 MILLIGRAM(S): at 22:43

## 2019-04-02 RX ADMIN — GABAPENTIN 300 MILLIGRAM(S): 400 CAPSULE ORAL at 13:51

## 2019-04-02 RX ADMIN — GABAPENTIN 300 MILLIGRAM(S): 400 CAPSULE ORAL at 21:26

## 2019-04-02 RX ADMIN — LIDOCAINE 1 PATCH: 4 CREAM TOPICAL at 09:03

## 2019-04-02 RX ADMIN — OXYCODONE AND ACETAMINOPHEN 2 TABLET(S): 5; 325 TABLET ORAL at 09:58

## 2019-04-02 RX ADMIN — LIDOCAINE 1 PATCH: 4 CREAM TOPICAL at 21:27

## 2019-04-02 RX ADMIN — GABAPENTIN 300 MILLIGRAM(S): 400 CAPSULE ORAL at 07:02

## 2019-04-02 RX ADMIN — Medication 100 MILLIGRAM(S): at 07:02

## 2019-04-02 RX ADMIN — HEPARIN SODIUM 5000 UNIT(S): 5000 INJECTION INTRAVENOUS; SUBCUTANEOUS at 07:02

## 2019-04-02 RX ADMIN — CYCLOBENZAPRINE HYDROCHLORIDE 10 MILLIGRAM(S): 10 TABLET, FILM COATED ORAL at 09:05

## 2019-04-02 RX ADMIN — CYCLOBENZAPRINE HYDROCHLORIDE 10 MILLIGRAM(S): 10 TABLET, FILM COATED ORAL at 21:26

## 2019-04-02 RX ADMIN — Medication 1 TABLET(S): at 12:00

## 2019-04-02 NOTE — PROGRESS NOTE ADULT - ASSESSMENT
· Assessment		  48yF    Herniated disc, cervical  Hypertension  s/p 2/2019 laminectomy    Admitted with f/c POSTOPERATIVE INFECTION with incisional subcutaneous abscess  Sepsis ruled out on admission  WBC 6.9  S/p OR 3/22  Bcx 322, 23 , 24  with coNS, Bcx 3/26 NTD  WCx also with Staph  TTE no vegetations  Vanco level 20    - Repeat bcx given fever  - Continue to hold vanc, check level 4/2 AM  - If <20, start Vanc 1.25 q24h (once Cr normalizes, will increase to 1.25 q12h)  - Trend Cr  - Will need 4-6 week IV abx   - ID follow-up [id number] [phone] · Assessment		  48yF    Herniated disc, cervical  Hypertension  s/p 2/2019 laminectomy    Admitted with f/c POSTOPERATIVE INFECTION with incisional subcutaneous abscess  Sepsis ruled out on admission  WBC 6.9  S/p OR 3/22  Bcx 322, 23 , 24  with coNS, Bcx 3/26 NTD  WCx also with Staph  TTE no vegetations  Vanco level 20  New rash, unclear etiology    - As rash, ADD on differential on CBC to eval for eos, check LFTs  - Repeat bcx given fever  - Continue to hold vanc, check level 4/2 AM  - If <20, start Vanc 1.25 q24h (once Cr normalizes, will increase to 1.25 q12h)  - Trend Cr  - Will need 4-6 week IV abx   - ID follow-up [id number] [phone]

## 2019-04-02 NOTE — PROGRESS NOTE ADULT - SUBJECTIVE AND OBJECTIVE BOX
Subjective: 48yFemale with a pmhx of POSTOPERATIVE INFECTION  ^POSTOPERATIVE INFECTION  Handoff  MEWS Score  Herniated disc, cervical  Hypertension  No pertinent past medical history  Postoperative infection, unspecified type, initial encounter  Postoperative infection, unspecified type, initial encounter  Incision and drainage, wound, lumbar  Complex incision and drainage of wound infection  H/O lumbar discectomy  S/P SURGERY PAIN/INFECTION  20    POD # 12    S/P Lumbar Wound Washout    Pt seen and examined at bedside w Dr Christensen. Pt sts she has pain to left lower back.     Allergies    No Known Allergies    Intolerances        Vital Signs Last 24 Hrs  T(C): 36.7 (02 Apr 2019 06:30), Max: 38.1 (01 Apr 2019 15:44)  T(F): 98.1 (02 Apr 2019 06:30), Max: 100.6 (01 Apr 2019 15:44)  HR: 82 (02 Apr 2019 06:30) (82 - 100)  BP: 109/57 (02 Apr 2019 06:30) (109/57 - 170/77)  BP(mean): --  RR: 18 (02 Apr 2019 06:30) (18 - 18)  SpO2: --      acetaminophen   Tablet .. 650 milliGRAM(s) Oral every 6 hours PRN  BACItracin   Ointment 1 Application(s) Topical daily  bisacodyl Suppository 10 milliGRAM(s) Rectal daily PRN  cyanocobalamin 1000 MICROGram(s) Oral daily  cyclobenzaprine 10 milliGRAM(s) Oral two times a day PRN  docusate sodium 100 milliGRAM(s) Oral three times a day  gabapentin 300 milliGRAM(s) Oral every 8 hours  heparin  Injectable 5000 Unit(s) SubCutaneous every 8 hours  labetalol 100 milliGRAM(s) Oral daily  lidocaine   Patch 1 Patch Transdermal every 24 hours  morphine  - Injectable 2 milliGRAM(s) IV Push every 4 hours PRN  multivitamin 1 Tablet(s) Oral daily  ondansetron Injectable 4 milliGRAM(s) IV Push every 6 hours PRN  oxyCODONE    5 mG/acetaminophen 325 mG 1 Tablet(s) Oral every 4 hours PRN  oxyCODONE    5 mG/acetaminophen 325 mG 2 Tablet(s) Oral every 4 hours PRN  pantoprazole    Tablet 40 milliGRAM(s) Oral before breakfast  polyethylene glycol 3350 17 Gram(s) Oral daily  senna 2 Tablet(s) Oral at bedtime  sodium chloride 0.9%. 1000 milliLiter(s) IV Continuous <Continuous>        04-01-19 @ 07:01  -  04-02-19 @ 07:00  --------------------------------------------------------  IN: 0 mL / OUT: 1 mL / NET: -1 mL    04-02-19 @ 07:01  -  04-02-19 @ 10:18  --------------------------------------------------------  IN: 0 mL / OUT: 1 mL / NET: -1 mL          Exam:  AAOX3. Verbal function intact  follows commands  Motor: MAEx4  b/l LE strength 5/5  Sensation: intact b/l           CBC Full  -  ( 02 Apr 2019 06:22 )  WBC Count : 7.77 K/uL  RBC Count : 3.69 M/uL  Hemoglobin : 10.8 g/dL  Hematocrit : 33.2 %  Platelet Count - Automated : 401 K/uL  Mean Cell Volume : 90.0 fL  Mean Cell Hemoglobin : 29.3 pg  Mean Cell Hemoglobin Concentration : 32.5 g/dL  Auto Neutrophil # : x  Auto Lymphocyte # : x  Auto Monocyte # : x  Auto Eosinophil # : x  Auto Basophil # : x  Auto Neutrophil % : x  Auto Lymphocyte % : x  Auto Monocyte % : x  Auto Eosinophil % : x  Auto Basophil % : x    04-02    140  |  99  |  22<H>  ----------------------------<  111<H>  4.9   |  26  |  2.0<H>    Ca    8.5      02 Apr 2019 06:22  Phos  5.9     04-02  Mg     1.8     04-02              Wound: wound w sutures intact, no drainage      Assessment/Plan: as above  Lidoderm patch to affected area  f/u Vanco level today  if level  <20 will restart vanco 1.25 q24h  if creatnine goes back to baseline, will restart vanco at 1.25 q12h  BMP tomorrow  LSO brace for comfort  PT/rehab  pain control  discussed w attg

## 2019-04-02 NOTE — PROGRESS NOTE ADULT - SUBJECTIVE AND OBJECTIVE BOX
KAMI, DANIEL  48y, Female      OVERNIGHT EVENTS:  febrile 100.6  Cr 2  pending vanc level    ROS negative except as per above    VITALS:  T(F): 98.1, Max: 100.6 (19 @ 15:44)  HR: 84  BP: 114/62  RR: 16Vital Signs Last 24 Hrs  T(C): 36.7 (2019 06:30), Max: 38.1 (2019 15:44)  T(F): 98.1 (2019 06:30), Max: 100.6 (2019 15:44)  HR: 84 (2019 12:02) (82 - 100)  BP: 114/62 (2019 12:02) (109/57 - 170/77)  BP(mean): --  RR: 16 (2019 12:02) (16 - 18)  SpO2: --    PHYSICAL EXAM  Gen: Awake and alert, non-toxic appearing, NAD  HEENT: NCAT. EOMI. MMM.   Neck: Supple, no cervical LAD  CV: RRR, no murmurs  Lungs: CTAB, no w/r/r  Abd: Soft. NTND  Extr: wwp, no edema  Skin: no rash  Neuro: No focal deficits  Lines: PICC clean    TESTS & MEASUREMENTS:                        10.8   7.77  )-----------( 401      ( 2019 06:22 )             33.2     04-    140  |  99  |  22<H>  ----------------------------<  111<H>  4.9   |  26  |  2.0<H>    Ca    8.5      2019 06:22  Phos  5.9     -02  Mg     1.8     04-02          Culture - Blood (collected 19 @ 06:06)  Source: .Blood None  Preliminary Report (19 @ 14:00):    No growth to date.    Culture - Blood (collected 19 @ 05:51)  Source: .Blood None  Preliminary Report (19 @ 15:01):    No growth to date.    Culture - Blood (collected 19 @ 06:32)  Source: .Blood None  Final Report (19 @ 14:01):    No growth at 5 days.    Culture - Blood (collected 19 @ 19:56)  Source: .Blood None  Final Report (19 @ 02:18):    No growth at 5 days.      Urinalysis Basic - ( 2019 07:20 )    Color: Yellow / Appearance: Cloudy / S.015 / pH: x  Gluc: x / Ketone: Negative  / Bili: Negative / Urobili: 0.2 mg/dL   Blood: x / Protein: Trace mg/dL / Nitrite: Negative   Leuk Esterase: Negative / RBC: 1-2 /HPF / WBC 6-10 /HPF   Sq Epi: x / Non Sq Epi: Occasional /HPF / Bacteria: x        RADIOLOGY & ADDITIONAL TESTS:    ANTIBIOTICS:  cefepime   IVPB   100 mL/Hr IV Intermittent (19 @ 05:47)   100 mL/Hr IV Intermittent (19 @ 22:09)   100 mL/Hr IV Intermittent (19 @ 14:09)   100 mL/Hr IV Intermittent (19 @ 05:46)   100 mL/Hr IV Intermittent (19 @ 21:40)   100 mL/Hr IV Intermittent (19 @ 13:43)   100 mL/Hr IV Intermittent (19 @ 05:50)   100 mL/Hr IV Intermittent (19 @ 01:03)    vancomycin  IVPB   166.67 mL/Hr IV Intermittent (19 @ 06:17)   166.67 mL/Hr IV Intermittent (19 @ 21:37)    vancomycin  IVPB   166.67 mL/Hr IV Intermittent (19 @ 06:03)   166.67 mL/Hr IV Intermittent (19 @ 21:30)   166.67 mL/Hr IV Intermittent (19 @ 14:15)   166.67 mL/Hr IV Intermittent (19 @ 05:12)   166.67 mL/Hr IV Intermittent (19 @ 21:53)   166.67 mL/Hr IV Intermittent (19 @ 16:59)    vancomycin  IVPB   250 mL/Hr IV Intermittent (19 @ 06:24)   250 mL/Hr IV Intermittent (19 @ 22:08)   250 mL/Hr IV Intermittent (19 @ 05:51)   250 mL/Hr IV Intermittent (19 @ 17:08)   250 mL/Hr IV Intermittent (19 @ 06:29)   250 mL/Hr IV Intermittent (19 @ 17:14)   250 mL/Hr IV Intermittent (19 @ 05:50) KAMI, DANIEL  48y, Female      OVERNIGHT EVENTS:  febrile 100.6  Cr 2  pending vanc level  new rash on torso nonpruritic     ROS negative except as per above    VITALS:  T(F): 98.1, Max: 100.6 (19 @ 15:44)  HR: 84  BP: 114/62  RR: 16Vital Signs Last 24 Hrs  T(C): 36.7 (2019 06:30), Max: 38.1 (2019 15:44)  T(F): 98.1 (2019 06:30), Max: 100.6 (2019 15:44)  HR: 84 (2019 12:02) (82 - 100)  BP: 114/62 (2019 12:02) (109/57 - 170/77)  BP(mean): --  RR: 16 (2019 12:02) (16 - 18)  SpO2: --    PHYSICAL EXAM  Gen: Awake and alert, non-toxic appearing, NAD  HEENT: NCAT. EOMI. MMM.   Neck: Supple, no cervical LAD  CV: RRR, no murmurs  Lungs: CTAB, no w/r/r  Abd: Soft. NTND  Extr: wwp, no edema  Skin: no rash  Neuro: No focal deficits  Lines: PICC clean    TESTS & MEASUREMENTS:                        10.8   7.77  )-----------( 401      ( 2019 06:22 )             33.2     04-    140  |  99  |  22<H>  ----------------------------<  111<H>  4.9   |  26  |  2.0<H>    Ca    8.5      2019 06:22  Phos  5.9     04-02  Mg     1.8     04-02          Culture - Blood (collected 19 @ 06:06)  Source: .Blood None  Preliminary Report (19 @ 14:00):    No growth to date.    Culture - Blood (collected 19 @ 05:51)  Source: .Blood None  Preliminary Report (19 @ 15:01):    No growth to date.    Culture - Blood (collected 19 @ 06:32)  Source: .Blood None  Final Report (19 @ 14:01):    No growth at 5 days.    Culture - Blood (collected 19 @ 19:56)  Source: .Blood None  Final Report (19 @ 02:18):    No growth at 5 days.      Urinalysis Basic - ( 2019 07:20 )    Color: Yellow / Appearance: Cloudy / S.015 / pH: x  Gluc: x / Ketone: Negative  / Bili: Negative / Urobili: 0.2 mg/dL   Blood: x / Protein: Trace mg/dL / Nitrite: Negative   Leuk Esterase: Negative / RBC: 1-2 /HPF / WBC 6-10 /HPF   Sq Epi: x / Non Sq Epi: Occasional /HPF / Bacteria: x        RADIOLOGY & ADDITIONAL TESTS:    ANTIBIOTICS:  cefepime   IVPB   100 mL/Hr IV Intermittent (19 @ 05:47)   100 mL/Hr IV Intermittent (19 @ 22:09)   100 mL/Hr IV Intermittent (19 @ 14:09)   100 mL/Hr IV Intermittent (19 @ 05:46)   100 mL/Hr IV Intermittent (19 @ 21:40)   100 mL/Hr IV Intermittent (19 @ 13:43)   100 mL/Hr IV Intermittent (19 @ 05:50)   100 mL/Hr IV Intermittent (19 @ 01:03)    vancomycin  IVPB   166.67 mL/Hr IV Intermittent (19 @ 06:17)   166.67 mL/Hr IV Intermittent (19 @ 21:37)    vancomycin  IVPB   166.67 mL/Hr IV Intermittent (19 @ 06:03)   166.67 mL/Hr IV Intermittent (19 @ 21:30)   166.67 mL/Hr IV Intermittent (19 @ 14:15)   166.67 mL/Hr IV Intermittent (19 @ 05:12)   166.67 mL/Hr IV Intermittent (19 @ 21:53)   166.67 mL/Hr IV Intermittent (19 @ 16:59)    vancomycin  IVPB   250 mL/Hr IV Intermittent (19 @ 06:24)   250 mL/Hr IV Intermittent (03-25-19 @ 22:08)   250 mL/Hr IV Intermittent (19 @ 05:51)   250 mL/Hr IV Intermittent (19 @ 17:08)   250 mL/Hr IV Intermittent (19 @ 06:29)   250 mL/Hr IV Intermittent (19 @ 17:14)   250 mL/Hr IV Intermittent (19 @ 05:50) KAMI, DANIEL  48y, Female      OVERNIGHT EVENTS:  febrile 100.6  Cr 2  pending vanc level  new rash on torso nonpruritic     ROS negative except as per above    VITALS:  T(F): 98.1, Max: 100.6 (19 @ 15:44)  HR: 84  BP: 114/62  RR: 16Vital Signs Last 24 Hrs  T(C): 36.7 (2019 06:30), Max: 38.1 (2019 15:44)  T(F): 98.1 (2019 06:30), Max: 100.6 (2019 15:44)  HR: 84 (2019 12:02) (82 - 100)  BP: 114/62 (2019 12:02) (109/57 - 170/77)  BP(mean): --  RR: 16 (2019 12:02) (16 - 18)  SpO2: --    PHYSICAL EXAM  Gen: Awake and alert, non-toxic appearing, NAD  HEENT: NCAT. EOMI. MMM.   Neck: Supple, no cervical LAD  CV: RRR, no murmurs  Lungs: CTAB, no w/r/r  Abd: Soft. NTND  Extr: wwp, no edema  Skin: erythematous rash on torso  Neuro: No focal deficits  Lines: PICC clean    TESTS & MEASUREMENTS:                        10.8   7.77  )-----------( 401      ( 2019 06:22 )             33.2     04-02    140  |  99  |  22<H>  ----------------------------<  111<H>  4.9   |  26  |  2.0<H>    Ca    8.5      2019 06:22  Phos  5.9     04-02  Mg     1.8     04-02          Culture - Blood (collected 19 @ 06:06)  Source: .Blood None  Preliminary Report (19 @ 14:00):    No growth to date.    Culture - Blood (collected 19 @ 05:51)  Source: .Blood None  Preliminary Report (19 @ 15:01):    No growth to date.    Culture - Blood (collected 19 @ 06:32)  Source: .Blood None  Final Report (19 @ 14:01):    No growth at 5 days.    Culture - Blood (collected 19 @ 19:56)  Source: .Blood None  Final Report (19 @ 02:18):    No growth at 5 days.      Urinalysis Basic - ( 2019 07:20 )    Color: Yellow / Appearance: Cloudy / S.015 / pH: x  Gluc: x / Ketone: Negative  / Bili: Negative / Urobili: 0.2 mg/dL   Blood: x / Protein: Trace mg/dL / Nitrite: Negative   Leuk Esterase: Negative / RBC: 1-2 /HPF / WBC 6-10 /HPF   Sq Epi: x / Non Sq Epi: Occasional /HPF / Bacteria: x        RADIOLOGY & ADDITIONAL TESTS:    ANTIBIOTICS:  cefepime   IVPB   100 mL/Hr IV Intermittent (19 @ 05:47)   100 mL/Hr IV Intermittent (19 @ 22:09)   100 mL/Hr IV Intermittent (19 @ 14:09)   100 mL/Hr IV Intermittent (19 @ 05:46)   100 mL/Hr IV Intermittent (19 @ 21:40)   100 mL/Hr IV Intermittent (19 @ 13:43)   100 mL/Hr IV Intermittent (19 @ 05:50)   100 mL/Hr IV Intermittent (19 @ 01:03)    vancomycin  IVPB   166.67 mL/Hr IV Intermittent (19 @ 06:17)   166.67 mL/Hr IV Intermittent (19 @ 21:37)    vancomycin  IVPB   166.67 mL/Hr IV Intermittent (19 @ 06:03)   166.67 mL/Hr IV Intermittent (19 @ 21:30)   166.67 mL/Hr IV Intermittent (19 @ 14:15)   166.67 mL/Hr IV Intermittent (19 @ 05:12)   166.67 mL/Hr IV Intermittent (19 @ 21:53)   166.67 mL/Hr IV Intermittent (19 @ 16:59)    vancomycin  IVPB   250 mL/Hr IV Intermittent (19 @ 06:24)   250 mL/Hr IV Intermittent (19 @ 22:08)   250 mL/Hr IV Intermittent (19 @ 05:51)   250 mL/Hr IV Intermittent (19 @ 17:08)   250 mL/Hr IV Intermittent (19 @ 06:29)   250 mL/Hr IV Intermittent (19 @ 17:14)   250 mL/Hr IV Intermittent (19 @ 05:50)

## 2019-04-02 NOTE — CHART NOTE - NSCHARTNOTEFT_GEN_A_CORE
Registered Dietitian Follow-Up     Patient Profile Reviewed                           Yes [X]   No []     Nutrition History Previously Obtained        Yes [X]  No []       Pertinent Subjective Information: Vegetarian diet order still pending and patient still continues to rely mostly on foods from home (prefers [place] foods). However eating well with foods she gets from home, and reports she ate a cheese sandwich for lunch today. I spoke w/ CA covering to ensure that pt receives a vegetarian diet, even if diet order is not activated. I will follow up in 3 days to ensure that pt is receiving multiple vegetarian options.      Pertinent Medical Interventions: POD # 12 S/P Lumbar Wound Washout     Diet order: Regular diet     Anthropometrics:  - Ht.  - Wt. 72.6 kg 3/22- no new wt  - %wt change  - BMI  - IBW     Pertinent Lab Data: 4/2: Na 140, K 4.9, BUN 22, Cr 2.0  - cr trending up- will monitor     Pertinent Meds: heparin, morphine, oxycodone, docusate, multivit ,ondansetron, senna     Physical Findings:  - Appearance: alert/oriented; no edema  - GI function: +BM 4/1  - Tubes:  - Oral/Mouth cavity: no problems noted  - Skin: Intact     Nutrition Requirements  Weight Used: 72.6 kg     ESTIMATED NEEDS ongoing from 3/29:  Calorie: 8664-1643 kcals/day (MSJ x 1.2-1.3)  Protein: 73-87 g/day (1-1.2 g/kg ABW)  Fluids: 1ml/kcal       Nutrient Intake: Consuming 100% of meals, however likely not eating 3 meals/day 2/2 food preferences and not receiving a vegetarian diet menu here        [] Previous Nutrition Diagnosis: Predicted suboptimal energy intake (ongoing)            [] Ongoing          [] Resolved     Nutrition Intervention: meals and snacks     Goal/Expected Outcome: Pt to receive vegetarian diet by f/u in 3 days     Indicator/Monitoring: RD to monitor diet order, energy intake    RECS:  1) Change diet to vegeterian   - also discussed w/ CA covering in case diet order is not activated

## 2019-04-03 LAB
ANION GAP SERPL CALC-SCNC: 13 MMOL/L — SIGNIFICANT CHANGE UP (ref 7–14)
ANION GAP SERPL CALC-SCNC: 14 MMOL/L — SIGNIFICANT CHANGE UP (ref 7–14)
ANION GAP SERPL CALC-SCNC: 17 MMOL/L — HIGH (ref 7–14)
BUN SERPL-MCNC: 20 MG/DL — SIGNIFICANT CHANGE UP (ref 10–20)
BUN SERPL-MCNC: 20 MG/DL — SIGNIFICANT CHANGE UP (ref 10–20)
BUN SERPL-MCNC: 21 MG/DL — HIGH (ref 10–20)
CALCIUM SERPL-MCNC: 8.5 MG/DL — SIGNIFICANT CHANGE UP (ref 8.5–10.1)
CALCIUM SERPL-MCNC: 8.7 MG/DL — SIGNIFICANT CHANGE UP (ref 8.5–10.1)
CALCIUM SERPL-MCNC: 8.9 MG/DL — SIGNIFICANT CHANGE UP (ref 8.5–10.1)
CHLORIDE SERPL-SCNC: 97 MMOL/L — LOW (ref 98–110)
CHLORIDE SERPL-SCNC: 97 MMOL/L — LOW (ref 98–110)
CHLORIDE SERPL-SCNC: 98 MMOL/L — SIGNIFICANT CHANGE UP (ref 98–110)
CK SERPL-CCNC: 53 U/L — SIGNIFICANT CHANGE UP (ref 0–225)
CO2 SERPL-SCNC: 24 MMOL/L — SIGNIFICANT CHANGE UP (ref 17–32)
CO2 SERPL-SCNC: 26 MMOL/L — SIGNIFICANT CHANGE UP (ref 17–32)
CO2 SERPL-SCNC: 29 MMOL/L — SIGNIFICANT CHANGE UP (ref 17–32)
CREAT SERPL-MCNC: 1.8 MG/DL — HIGH (ref 0.7–1.5)
CREAT SERPL-MCNC: 1.8 MG/DL — HIGH (ref 0.7–1.5)
CREAT SERPL-MCNC: 1.9 MG/DL — HIGH (ref 0.7–1.5)
CULTURE RESULTS: SIGNIFICANT CHANGE UP
ERYTHROCYTE [SEDIMENTATION RATE] IN BLOOD: >140 MM/HR — HIGH (ref 0–20)
GLUCOSE SERPL-MCNC: 107 MG/DL — HIGH (ref 70–99)
GLUCOSE SERPL-MCNC: 114 MG/DL — HIGH (ref 70–99)
GLUCOSE SERPL-MCNC: 115 MG/DL — HIGH (ref 70–99)
MAGNESIUM SERPL-MCNC: 1.7 MG/DL — LOW (ref 1.8–2.4)
MAGNESIUM SERPL-MCNC: 1.7 MG/DL — LOW (ref 1.8–2.4)
PHOSPHATE SERPL-MCNC: 4.4 MG/DL — SIGNIFICANT CHANGE UP (ref 2.1–4.9)
PHOSPHATE SERPL-MCNC: 5 MG/DL — HIGH (ref 2.1–4.9)
POTASSIUM SERPL-MCNC: 4.4 MMOL/L — SIGNIFICANT CHANGE UP (ref 3.5–5)
POTASSIUM SERPL-MCNC: 5.1 MMOL/L — HIGH (ref 3.5–5)
POTASSIUM SERPL-MCNC: 5.4 MMOL/L — HIGH (ref 3.5–5)
POTASSIUM SERPL-SCNC: 4.4 MMOL/L — SIGNIFICANT CHANGE UP (ref 3.5–5)
POTASSIUM SERPL-SCNC: 5.1 MMOL/L — HIGH (ref 3.5–5)
POTASSIUM SERPL-SCNC: 5.4 MMOL/L — HIGH (ref 3.5–5)
SODIUM SERPL-SCNC: 137 MMOL/L — SIGNIFICANT CHANGE UP (ref 135–146)
SODIUM SERPL-SCNC: 138 MMOL/L — SIGNIFICANT CHANGE UP (ref 135–146)
SODIUM SERPL-SCNC: 140 MMOL/L — SIGNIFICANT CHANGE UP (ref 135–146)
SPECIMEN SOURCE: SIGNIFICANT CHANGE UP
VANCOMYCIN FLD-MCNC: 17.8 UG/ML — HIGH (ref 5–10)
VANCOMYCIN FLD-MCNC: 21.2 UG/ML — HIGH (ref 5–10)

## 2019-04-03 PROCEDURE — 93970 EXTREMITY STUDY: CPT | Mod: 26

## 2019-04-03 PROCEDURE — 71045 X-RAY EXAM CHEST 1 VIEW: CPT | Mod: 26

## 2019-04-03 RX ORDER — NYSTATIN CREAM 100000 [USP'U]/G
1 CREAM TOPICAL
Qty: 0 | Refills: 0 | Status: DISCONTINUED | OUTPATIENT
Start: 2019-04-03 | End: 2019-04-10

## 2019-04-03 RX ORDER — DAPTOMYCIN 500 MG/10ML
440 INJECTION, POWDER, LYOPHILIZED, FOR SOLUTION INTRAVENOUS EVERY 24 HOURS
Qty: 0 | Refills: 0 | Status: DISCONTINUED | OUTPATIENT
Start: 2019-04-03 | End: 2019-04-10

## 2019-04-03 RX ADMIN — OXYCODONE AND ACETAMINOPHEN 2 TABLET(S): 5; 325 TABLET ORAL at 21:26

## 2019-04-03 RX ADMIN — Medication 100 MILLIGRAM(S): at 13:43

## 2019-04-03 RX ADMIN — Medication 1 TABLET(S): at 11:44

## 2019-04-03 RX ADMIN — SENNA PLUS 2 TABLET(S): 8.6 TABLET ORAL at 21:26

## 2019-04-03 RX ADMIN — Medication 100 MILLIGRAM(S): at 08:00

## 2019-04-03 RX ADMIN — OXYCODONE AND ACETAMINOPHEN 2 TABLET(S): 5; 325 TABLET ORAL at 22:30

## 2019-04-03 RX ADMIN — Medication 650 MILLIGRAM(S): at 16:45

## 2019-04-03 RX ADMIN — LIDOCAINE 1 PATCH: 4 CREAM TOPICAL at 09:39

## 2019-04-03 RX ADMIN — DAPTOMYCIN 117.6 MILLIGRAM(S): 500 INJECTION, POWDER, LYOPHILIZED, FOR SOLUTION INTRAVENOUS at 14:46

## 2019-04-03 RX ADMIN — NYSTATIN CREAM 1 APPLICATION(S): 100000 CREAM TOPICAL at 17:14

## 2019-04-03 RX ADMIN — PREGABALIN 1000 MICROGRAM(S): 225 CAPSULE ORAL at 11:44

## 2019-04-03 RX ADMIN — PANTOPRAZOLE SODIUM 40 MILLIGRAM(S): 20 TABLET, DELAYED RELEASE ORAL at 06:32

## 2019-04-03 RX ADMIN — Medication 1 APPLICATION(S): at 11:45

## 2019-04-03 RX ADMIN — Medication 100 MILLIGRAM(S): at 06:32

## 2019-04-03 RX ADMIN — Medication 100 MILLIGRAM(S): at 21:26

## 2019-04-03 RX ADMIN — LIDOCAINE 1 PATCH: 4 CREAM TOPICAL at 21:00

## 2019-04-03 RX ADMIN — HEPARIN SODIUM 5000 UNIT(S): 5000 INJECTION INTRAVENOUS; SUBCUTANEOUS at 13:43

## 2019-04-03 RX ADMIN — OXYCODONE AND ACETAMINOPHEN 2 TABLET(S): 5; 325 TABLET ORAL at 11:45

## 2019-04-03 RX ADMIN — GABAPENTIN 300 MILLIGRAM(S): 400 CAPSULE ORAL at 06:32

## 2019-04-03 RX ADMIN — HEPARIN SODIUM 5000 UNIT(S): 5000 INJECTION INTRAVENOUS; SUBCUTANEOUS at 21:26

## 2019-04-03 RX ADMIN — GABAPENTIN 300 MILLIGRAM(S): 400 CAPSULE ORAL at 21:26

## 2019-04-03 RX ADMIN — GABAPENTIN 300 MILLIGRAM(S): 400 CAPSULE ORAL at 13:43

## 2019-04-03 RX ADMIN — OXYCODONE AND ACETAMINOPHEN 2 TABLET(S): 5; 325 TABLET ORAL at 12:00

## 2019-04-03 RX ADMIN — Medication 650 MILLIGRAM(S): at 17:55

## 2019-04-03 RX ADMIN — POLYETHYLENE GLYCOL 3350 17 GRAM(S): 17 POWDER, FOR SOLUTION ORAL at 11:44

## 2019-04-03 RX ADMIN — OXYCODONE AND ACETAMINOPHEN 2 TABLET(S): 5; 325 TABLET ORAL at 02:21

## 2019-04-03 RX ADMIN — SODIUM CHLORIDE 100 MILLILITER(S): 9 INJECTION INTRAMUSCULAR; INTRAVENOUS; SUBCUTANEOUS at 17:18

## 2019-04-03 RX ADMIN — HEPARIN SODIUM 5000 UNIT(S): 5000 INJECTION INTRAVENOUS; SUBCUTANEOUS at 06:33

## 2019-04-03 NOTE — CONSULT NOTE ADULT - SUBJECTIVE AND OBJECTIVE BOX
HPI:  48 year old female with hx of L4- S1 laminectomy in feb 2019,. pt did well postop .  Last Tuesday pt went to Dr Christensen for drainage of incision and they cleaned it out pt felt better over the past few days she has felt worse , pt with subjective fevers . A lot of pain in her LB ., at present time pt does not report any radicular pain . Incision site was cleaned by Neurosurgery PA , no drainage at present time noted , small pin hole opening at the bottom of incision . (22 Mar 2019 08:45)      PAST MEDICAL & SURGICAL HISTORY:  Herniated disc, cervical  Hypertension  H/O lumbar discectomy  Rosacea    CONSULT:  Chart reviewed. Pt previously on Cefepime until 3/25. Vancomycin changed to Daptomycin past 24hrs.      present in room   Pt reports only back pain ; pressed further -does admit to chills. Reports noting redness on chest shortly after PICC line placed. Denies pain or itching    Previous  Vital Signs Last 24 Hrs  T(C): 38.1 (03 Apr 2019 11:45), Max: 38.9 (03 Apr 2019 02:00)  T(F): 100.6 (03 Apr 2019 11:45), Max: 102 (03 Apr 2019 02:00)  HR: 89 (03 Apr 2019 08:00) (89 - 113)  BP: 144/75 (03 Apr 2019 08:00) (101/59 - 144/75)  RR: 18 (03 Apr 2019 06:02) (18 - 18)                            10.7   7.90  )-----------( 415      ( 02 Apr 2019 14:06 )             32.8     EXAM:   pt awake. alert in no acute distress but tremulous/ having chills   Face- central erythema - nose and medial cheeks (  states this is usual due to rosacea )            sclera clear ; no oral mucosal involvement   CVS reg  Skin - generalized fine reddened maculo - papular rash - chest,  abdomen sl early peeling , extremities ;           no palmar or plantar findings ; no discrete blisters or open wounds         - medial thighs - desquamation of pigmented skin, no open wounds ; vulva- no ulceration/ blisters  sl left leg swelling

## 2019-04-03 NOTE — CONSULT NOTE ADULT - ASSESSMENT
On abx for post laminectomy infection  Also has developed LUIS    Generalized rash past 48 hrs with no significant acute progression and fevers   No mucosal involvement or open lesions   Poss SJS  Rec:  hydration and close monitoring   If condition progresses  would transfer to Burn Unit for definitive treatment   Skin biopsy for confirmation if condition worsens

## 2019-04-03 NOTE — PROGRESS NOTE ADULT - ASSESSMENT
· Assessment		  48yF    Herniated disc, cervical  Hypertension  s/p 2/2019 laminectomy    Admitted with f/c POSTOPERATIVE INFECTION with incisional subcutaneous abscess  Sepsis ruled out on admission  WBC 6.9  S/p OR 3/22  Bcx 322, 23 , 24  with coNS, Bcx 3/26 NTD  WCx also with Staph  TTE no vegetations  Course c/b LUIS renal vanc toxicity (possible component of interstitial nephritis) and likely delayed hypersensitivity to vanc causing rash and fever    - Daptomycin 6mg/kg q24h  - Add on CPK to AM labs for baseline and check CPK weekly to eval for Dapto-related myopathy  - Trend Cr  - Will need 4-6 week IV abx via PICC

## 2019-04-03 NOTE — PROGRESS NOTE ADULT - SUBJECTIVE AND OBJECTIVE BOX
Subjective: 48yFemale with a pmhx of POSTOPERATIVE INFECTION  ^POSTOPERATIVE INFECTION  Handoff  MEWS Score  Herniated disc, cervical  Hypertension  No pertinent past medical history  Postoperative infection, unspecified type, initial encounter  Postoperative infection, unspecified type, initial encounter  Incision and drainage, wound, lumbar  Complex incision and drainage of wound infection  H/O lumbar discectomy  S/P SURGERY PAIN/INFECTION  20    POD # 13    S/P Lumbar Wound Washout    Pt seen and examined at bedside w Dr Higuera.  Pt sts she's feeling better today.  Ambulated.  Fevers overnight.     Allergies    No Known Allergies    Intolerances        Vital Signs Last 24 Hrs  T(C): 38.1 (03 Apr 2019 11:45), Max: 38.9 (03 Apr 2019 02:00)  T(F): 100.6 (03 Apr 2019 11:45), Max: 102 (03 Apr 2019 02:00)  HR: 89 (03 Apr 2019 08:00) (89 - 113)  BP: 144/75 (03 Apr 2019 08:00) (101/59 - 144/75)  BP(mean): --  RR: 18 (03 Apr 2019 06:02) (16 - 18)  SpO2: --      acetaminophen   Tablet .. 650 milliGRAM(s) Oral every 6 hours PRN  BACItracin   Ointment 1 Application(s) Topical daily  bisacodyl Suppository 10 milliGRAM(s) Rectal daily PRN  cyanocobalamin 1000 MICROGram(s) Oral daily  cyclobenzaprine 10 milliGRAM(s) Oral two times a day PRN  DAPTOmycin IVPB 440 milliGRAM(s) IV Intermittent every 24 hours  docusate sodium 100 milliGRAM(s) Oral three times a day  gabapentin 300 milliGRAM(s) Oral every 8 hours  heparin  Injectable 5000 Unit(s) SubCutaneous every 8 hours  labetalol 100 milliGRAM(s) Oral daily  lidocaine   Patch 1 Patch Transdermal every 24 hours  morphine  - Injectable 2 milliGRAM(s) IV Push every 4 hours PRN  multivitamin 1 Tablet(s) Oral daily  ondansetron Injectable 4 milliGRAM(s) IV Push every 6 hours PRN  oxyCODONE    5 mG/acetaminophen 325 mG 1 Tablet(s) Oral every 4 hours PRN  oxyCODONE    5 mG/acetaminophen 325 mG 2 Tablet(s) Oral every 4 hours PRN  pantoprazole    Tablet 40 milliGRAM(s) Oral before breakfast  polyethylene glycol 3350 17 Gram(s) Oral daily  senna 2 Tablet(s) Oral at bedtime  sodium chloride 0.9%. 1000 milliLiter(s) IV Continuous <Continuous>        04-02-19 @ 07:01  -  04-03-19 @ 07:00  --------------------------------------------------------  IN: 0 mL / OUT: 1 mL / NET: -1 mL          Exam:  AAOX3. Verbal function intact  follows commands  diffuse maculopapular rash overlying arms, trunk, legs  Motor: Motor: MAEx4  b/l LE strength 5/5  LLE edema  Sensation: intact       CBC Full  -  ( 02 Apr 2019 14:06 )  WBC Count : 7.90 K/uL  RBC Count : 3.61 M/uL  Hemoglobin : 10.7 g/dL  Hematocrit : 32.8 %  Platelet Count - Automated : 415 K/uL  Mean Cell Volume : 90.9 fL  Mean Cell Hemoglobin : 29.6 pg  Mean Cell Hemoglobin Concentration : 32.6 g/dL  Auto Neutrophil # : 4.16 K/uL  Auto Lymphocyte # : 2.31 K/uL  Auto Monocyte # : 1.13 K/uL  Auto Eosinophil # : 0.20 K/uL  Auto Basophil # : 0.03 K/uL  Auto Neutrophil % : 52.7 %  Auto Lymphocyte % : 29.2 %  Auto Monocyte % : 14.3 %  Auto Eosinophil % : 2.5 %  Auto Basophil % : 0.4 %    04-03    140  |  97<L>  |  20  ----------------------------<  107<H>  5.4<H>   |  29  |  1.8<H>    Ca    8.9      03 Apr 2019 12:01  Phos  5.0     04-03  Mg     1.7     04-03    TPro  5.8<L>  /  Alb  2.9<L>  /  TBili  <0.2  /  DBili  <0.2  /  AST  39  /  ALT  80<H>  /  AlkPhos  197<H>  04-02            Assessment/Plan: as above  DVT sono  change antibx to Daptomycin per ID  will need antibx 4-6 weeks  CXR today  Burn consult  obtain ESR, CRP, CPK, C3, C4  cont PT/rehab  discussed w attg

## 2019-04-03 NOTE — CONSULT NOTE ADULT - SUBJECTIVE AND OBJECTIVE BOX
49 y/o female with hx of L5-S1 Lumbar microdiscectomy. Was admitted on  with back pain radiating to leg and found to have new L4-L5 disc herniation.  Pt underwent L4-S1 Left lumbar discectomy on 2019. In mid March pt went to Dr Christensen for drainage of incision and they cleaned it out pt felt better over the past few days she has felt worse , with subjective fevers . A lot of pain in her LB, on admission pt did not report any radicular pain . Incision site was cleaned by Neurosurgery PA , no drainage at present st that time, small pin hole opening at the bottom of incision. Blood and wound culture grew Methicillin staph epidermitis.  Patient was started on Vanco 1gm q12h which was increased to 1250 q8h on . Dose was held over the weekend when her Vanco trough came back as 33 and on  she developed LUIS when her Cr jo ann to 1.8. Vanco level was drawn and on  was 7 so one more dose of Vanco was given after which her Trough went to 21. Patient also has a diffuse rash all over her body. Patient spiked a fever of 102F last night.     PAST MEDICAL & SURGICAL HISTORY:  Herniated disc, cervical  Hypertension  H/O lumbar discectomy    Allergies:  No Known Allergies    Home Medications Reviewed  Hospital Medications:   MEDICATIONS  (STANDING):  BACItracin   Ointment 1 Application(s) Topical daily  cyanocobalamin 1000 MICROGram(s) Oral daily  docusate sodium 100 milliGRAM(s) Oral three times a day  gabapentin 300 milliGRAM(s) Oral every 8 hours  heparin  Injectable 5000 Unit(s) SubCutaneous every 8 hours  labetalol 100 milliGRAM(s) Oral daily  lidocaine   Patch 1 Patch Transdermal every 24 hours  multivitamin 1 Tablet(s) Oral daily  pantoprazole    Tablet 40 milliGRAM(s) Oral before breakfast  polyethylene glycol 3350 17 Gram(s) Oral daily  senna 2 Tablet(s) Oral at bedtime  sodium chloride 0.9%. 1000 milliLiter(s) (100 mL/Hr) IV Continuous <Continuous>      SOCIAL HISTORY:  Denies ETOH,Smoking,   FAMILY HISTORY:        REVIEW OF SYSTEMS:  CONSTITUTIONAL: No weakness, fevers or chills  EYES/ENT: No visual changes;  No vertigo or throat pain   NECK: No pain or stiffness  RESPIRATORY: No cough, wheezing, hemoptysis; No shortness of breath  CARDIOVASCULAR: No chest pain or palpitations.  GASTROINTESTINAL: No abdominal or epigastric pain. No nausea, vomiting, or hematemesis; No diarrhea or constipation. No melena or hematochezia.  GENITOURINARY: No dysuria, frequency, foamy urine, urinary urgency, incontinence or hematuria  NEUROLOGICAL: No numbness or weakness  SKIN: No itching, burning, rashes, or lesions   VASCULAR: No bilateral lower extremity edema.   All other review of systems is negative unless indicated above.    VITALS:  T(F): 98.4 (19 @ 06:02), Max: 102 (19 @ 02:00)  HR: 89 (19 @ 08:00)  BP: 144/75 (19 @ 08:00)  RR: 18 (19 @ 06:02)  SpO2: --     @ 07:01  -  03 @ 07:00  --------------------------------------------------------  IN: 0 mL / OUT: 1 mL / NET: -1 mL        I&O's Detail    2019 07:01  -  2019 07:00  --------------------------------------------------------  IN:  Total IN: 0 mL    OUT:    Voided: 1 mL  Total OUT: 1 mL    Total NET: -1 mL      PHYSICAL EXAM:  Constitutional: NAD  HEENT: anicteric sclera, oropharynx clear, MMM  Neck: No JVD  Respiratory: CTAB, no wheezes, rales or rhonchi  Cardiovascular: S1, S2, RRR  Gastrointestinal: BS+, soft, NT/ND  Extremities: No cyanosis or clubbing. No peripheral edema  Neurological: A/O x 3, no focal deficits  Psychiatric: Normal mood, normal affect  : No CVA tenderness. No heard.   Skin: Diffuse rash  Vascular Access:    LABS:      137  |  98  |  20  ----------------------------<  114<H>  5.1<H>   |  26  |  1.9<H>    Ca    8.5      2019 06:07  Phos  5.0     04-  Mg     1.7     04-    TPro  5.8<L>  /  Alb  2.9<L>  /  TBili  <0.2  /  DBili  <0.2  /  AST  39  /  ALT  80<H>  /  AlkPhos  197<H>      Creatinine Trend: 1.9 <--, 2.0 <--, 1.8 <--, 1.9 <--, 1.8 <--, 1.9 <--, 1.8 <--, 0.6 <--, 0.5 <--, 0.5 <--, 0.5 <--, 0.5 <--, 0.5 <--                        10.7   7.90  )-----------( 415      ( 2019 14:06 )             32.8     Urine Studies:  Urinalysis Basic - ( 2019 07:20 )    Color: Yellow / Appearance: Cloudy / S.015 / pH:   Gluc:  / Ketone: Negative  / Bili: Negative / Urobili: 0.2 mg/dL   Blood:  / Protein: Trace mg/dL / Nitrite: Negative   Leuk Esterase: Negative / RBC: 1-2 /HPF / WBC 6-10 /HPF   Sq Epi:  / Non Sq Epi: Occasional /HPF / Bacteria:       RADIOLOGY & ADDITIONAL STUDIES:    < from: Xray Chest 1 View AP/PA (19 @ 22:30) >  Impression:      No radiographic evidence of acute pulmonary disease.    < end of copied text >

## 2019-04-03 NOTE — CONSULT NOTE ADULT - ASSESSMENT
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   -IV fluids LUIS due to Vancomycin induced nephrotoxicity vs AIN  -Patient was also on toradol which might have contributed to nephrotoxicity  -As per discussion with ID antibiotics will be switched to Daptomycin.   -Check compliment levels to rule out infectious glomerulonephritis.   -Cr level is staying stable. Monitor creatinine.

## 2019-04-03 NOTE — PROGRESS NOTE ADULT - SUBJECTIVE AND OBJECTIVE BOX
KAMI, DANIEL  48y, Female      OVERNIGHT EVENTS:  febrile 102  Pt denies HA, rhinorrhea, sore throat, cough, SOB, abd pain, diarrhea, n/v, dysuria, rash, arthralgias.  worsening rash now extending to LE;  no mucosal involvement  given vanc 1.25 mg x 1 22:43 last night as level was 8 in AM   Vancomycin Level, Random: 21.2 (19 @ 06:07)--> thus more of a peak    ROS negative except as per above    VITALS:  T(F): 98.4, Max: 102 (-19 @ 02:00)  HR: 89  BP: 144/75  RR: 18Vital Signs Last 24 Hrs  T(C): 36.9 (2019 06:02), Max: 38.9 (2019 02:00)  T(F): 98.4 (2019 06:02), Max: 102 (2019 02:00)  HR: 89 (2019 08:00) (84 - 113)  BP: 144/75 (2019 08:00) (101/59 - 144/75)  BP(mean): --  RR: 18 (2019 06:02) (16 - 18)  SpO2: --    PHYSICAL EXAM  Gen: Awake and alert  HEENT: NCAT. EOMI. MMM.   Neck: Supple, no cervical LAD  CV: RRR, no murmurs  Lungs: CTAB, no w/r/r  Abd: Soft. NTND  Extr: wwp, no edema  Skin: erythematous rash on torso now extending to UE/LE, no mucosal involvement  Neuro: No focal deficits  Lines: PICC clean      TESTS & MEASUREMENTS:                        10.7   7.90  )-----------( 415      ( 2019 14:06 )             32.8     04-03    137  |  98  |  20  ----------------------------<  114<H>  5.1<H>   |  26  |  1.9<H>    Ca    8.5      2019 06:07  Phos  5.0     04-03  Mg     1.7     04-03    TPro  5.8<L>  /  Alb  2.9<L>  /  TBili  <0.2  /  DBili  <0.2  /  AST  39  /  ALT  80<H>  /  AlkPhos  197<H>      LIVER FUNCTIONS - ( 2019 14:06 )  Alb: 2.9 g/dL / Pro: 5.8 g/dL / ALK PHOS: 197 U/L / ALT: 80 U/L / AST: 39 U/L / GGT: x             Culture - Blood (collected 19 @ 06:06)  Source: .Blood None  Preliminary Report (19 @ 14:00):    No growth to date.    Culture - Blood (collected 19 @ 05:51)  Source: .Blood None  Final Report (19 @ 15:00):    No growth at 5 days.      Urinalysis Basic - ( 2019 07:20 )    Color: Yellow / Appearance: Cloudy / S.015 / pH: x  Gluc: x / Ketone: Negative  / Bili: Negative / Urobili: 0.2 mg/dL   Blood: x / Protein: Trace mg/dL / Nitrite: Negative   Leuk Esterase: Negative / RBC: 1-2 /HPF / WBC 6-10 /HPF   Sq Epi: x / Non Sq Epi: Occasional /HPF / Bacteria: x        RADIOLOGY & ADDITIONAL TESTS:    ANTIBIOTICS:  cefepime   IVPB   100 mL/Hr IV Intermittent (19 @ 05:47)   100 mL/Hr IV Intermittent (19 @ 22:09)   100 mL/Hr IV Intermittent (19 @ 14:09)   100 mL/Hr IV Intermittent (19 @ 05:46)   100 mL/Hr IV Intermittent (19 @ 21:40)   100 mL/Hr IV Intermittent (19 @ 13:43)   100 mL/Hr IV Intermittent (19 @ 05:50)   100 mL/Hr IV Intermittent (19 @ 01:03)    vancomycin  IVPB   166.67 mL/Hr IV Intermittent (19 @ 06:17)   166.67 mL/Hr IV Intermittent (19 @ 21:37)    vancomycin  IVPB   166.67 mL/Hr IV Intermittent (19 @ 22:43)    vancomycin  IVPB   166.67 mL/Hr IV Intermittent (19 @ 06:03)   166.67 mL/Hr IV Intermittent (19 @ 21:30)   166.67 mL/Hr IV Intermittent (19 @ 14:15)   166.67 mL/Hr IV Intermittent (19 @ 05:12)   166.67 mL/Hr IV Intermittent (19 @ 21:53)   166.67 mL/Hr IV Intermittent (19 @ 16:59)    vancomycin  IVPB   250 mL/Hr IV Intermittent (19 @ 06:24)   250 mL/Hr IV Intermittent (19 @ 22:08)   250 mL/Hr IV Intermittent (19 @ 05:51)   250 mL/Hr IV Intermittent (19 @ 17:08)   250 mL/Hr IV Intermittent (19 @ 06:29)   250 mL/Hr IV Intermittent (19 @ 17:14)   250 mL/Hr IV Intermittent (19 @ 05:50)

## 2019-04-04 LAB
ANION GAP SERPL CALC-SCNC: 11 MMOL/L — SIGNIFICANT CHANGE UP (ref 7–14)
ANION GAP SERPL CALC-SCNC: 14 MMOL/L — SIGNIFICANT CHANGE UP (ref 7–14)
APTT BLD: 41.3 SEC — HIGH (ref 27–39.2)
BUN SERPL-MCNC: 18 MG/DL — SIGNIFICANT CHANGE UP (ref 10–20)
BUN SERPL-MCNC: 18 MG/DL — SIGNIFICANT CHANGE UP (ref 10–20)
C3 SERPL-MCNC: 184 MG/DL — HIGH (ref 81–157)
C4 SERPL-MCNC: 35 MG/DL — SIGNIFICANT CHANGE UP (ref 13–39)
CALCIUM SERPL-MCNC: 8.4 MG/DL — LOW (ref 8.5–10.1)
CALCIUM SERPL-MCNC: 8.4 MG/DL — LOW (ref 8.5–10.1)
CHLORIDE SERPL-SCNC: 100 MMOL/L — SIGNIFICANT CHANGE UP (ref 98–110)
CHLORIDE SERPL-SCNC: 99 MMOL/L — SIGNIFICANT CHANGE UP (ref 98–110)
CO2 SERPL-SCNC: 24 MMOL/L — SIGNIFICANT CHANGE UP (ref 17–32)
CO2 SERPL-SCNC: 27 MMOL/L — SIGNIFICANT CHANGE UP (ref 17–32)
CREAT SERPL-MCNC: 1.6 MG/DL — HIGH (ref 0.7–1.5)
CREAT SERPL-MCNC: 1.7 MG/DL — HIGH (ref 0.7–1.5)
CRP SERPL-MCNC: 9.26 MG/DL — HIGH (ref 0–0.4)
GLUCOSE SERPL-MCNC: 140 MG/DL — HIGH (ref 70–99)
GLUCOSE SERPL-MCNC: 99 MG/DL — SIGNIFICANT CHANGE UP (ref 70–99)
HCT VFR BLD CALC: 29.9 % — LOW (ref 37–47)
HCT VFR BLD CALC: 31.7 % — LOW (ref 37–47)
HGB BLD-MCNC: 10.5 G/DL — LOW (ref 12–16)
HGB BLD-MCNC: 9.9 G/DL — LOW (ref 12–16)
INR BLD: 1.15 RATIO — SIGNIFICANT CHANGE UP (ref 0.65–1.3)
MAGNESIUM SERPL-MCNC: 1.6 MG/DL — LOW (ref 1.8–2.4)
MAGNESIUM SERPL-MCNC: 1.6 MG/DL — LOW (ref 1.8–2.4)
MCHC RBC-ENTMCNC: 29.5 PG — SIGNIFICANT CHANGE UP (ref 27–31)
MCHC RBC-ENTMCNC: 29.7 PG — SIGNIFICANT CHANGE UP (ref 27–31)
MCHC RBC-ENTMCNC: 33.1 G/DL — SIGNIFICANT CHANGE UP (ref 32–37)
MCHC RBC-ENTMCNC: 33.1 G/DL — SIGNIFICANT CHANGE UP (ref 32–37)
MCV RBC AUTO: 89 FL — SIGNIFICANT CHANGE UP (ref 81–99)
MCV RBC AUTO: 89.5 FL — SIGNIFICANT CHANGE UP (ref 81–99)
NRBC # BLD: 0 /100 WBCS — SIGNIFICANT CHANGE UP (ref 0–0)
NRBC # BLD: 0 /100 WBCS — SIGNIFICANT CHANGE UP (ref 0–0)
PHOSPHATE SERPL-MCNC: 4 MG/DL — SIGNIFICANT CHANGE UP (ref 2.1–4.9)
PHOSPHATE SERPL-MCNC: 4.4 MG/DL — SIGNIFICANT CHANGE UP (ref 2.1–4.9)
PLATELET # BLD AUTO: 371 K/UL — SIGNIFICANT CHANGE UP (ref 130–400)
PLATELET # BLD AUTO: 400 K/UL — SIGNIFICANT CHANGE UP (ref 130–400)
POTASSIUM SERPL-MCNC: 4.7 MMOL/L — SIGNIFICANT CHANGE UP (ref 3.5–5)
POTASSIUM SERPL-MCNC: 4.8 MMOL/L — SIGNIFICANT CHANGE UP (ref 3.5–5)
POTASSIUM SERPL-SCNC: 4.7 MMOL/L — SIGNIFICANT CHANGE UP (ref 3.5–5)
POTASSIUM SERPL-SCNC: 4.8 MMOL/L — SIGNIFICANT CHANGE UP (ref 3.5–5)
PROTHROM AB SERPL-ACNC: 13.2 SEC — HIGH (ref 9.95–12.87)
RBC # BLD: 3.36 M/UL — LOW (ref 4.2–5.4)
RBC # BLD: 3.54 M/UL — LOW (ref 4.2–5.4)
RBC # FLD: 12.8 % — SIGNIFICANT CHANGE UP (ref 11.5–14.5)
RBC # FLD: 12.9 % — SIGNIFICANT CHANGE UP (ref 11.5–14.5)
SODIUM SERPL-SCNC: 137 MMOL/L — SIGNIFICANT CHANGE UP (ref 135–146)
SODIUM SERPL-SCNC: 138 MMOL/L — SIGNIFICANT CHANGE UP (ref 135–146)
WBC # BLD: 6.75 K/UL — SIGNIFICANT CHANGE UP (ref 4.8–10.8)
WBC # BLD: 7.29 K/UL — SIGNIFICANT CHANGE UP (ref 4.8–10.8)
WBC # FLD AUTO: 6.75 K/UL — SIGNIFICANT CHANGE UP (ref 4.8–10.8)
WBC # FLD AUTO: 7.29 K/UL — SIGNIFICANT CHANGE UP (ref 4.8–10.8)

## 2019-04-04 RX ADMIN — Medication 100 MILLIGRAM(S): at 21:26

## 2019-04-04 RX ADMIN — NYSTATIN CREAM 1 APPLICATION(S): 100000 CREAM TOPICAL at 06:41

## 2019-04-04 RX ADMIN — PREGABALIN 1000 MICROGRAM(S): 225 CAPSULE ORAL at 12:23

## 2019-04-04 RX ADMIN — GABAPENTIN 300 MILLIGRAM(S): 400 CAPSULE ORAL at 21:26

## 2019-04-04 RX ADMIN — HEPARIN SODIUM 5000 UNIT(S): 5000 INJECTION INTRAVENOUS; SUBCUTANEOUS at 14:25

## 2019-04-04 RX ADMIN — GABAPENTIN 300 MILLIGRAM(S): 400 CAPSULE ORAL at 14:26

## 2019-04-04 RX ADMIN — HEPARIN SODIUM 5000 UNIT(S): 5000 INJECTION INTRAVENOUS; SUBCUTANEOUS at 06:40

## 2019-04-04 RX ADMIN — OXYCODONE AND ACETAMINOPHEN 2 TABLET(S): 5; 325 TABLET ORAL at 12:09

## 2019-04-04 RX ADMIN — PANTOPRAZOLE SODIUM 40 MILLIGRAM(S): 20 TABLET, DELAYED RELEASE ORAL at 06:40

## 2019-04-04 RX ADMIN — NYSTATIN CREAM 1 APPLICATION(S): 100000 CREAM TOPICAL at 17:21

## 2019-04-04 RX ADMIN — OXYCODONE AND ACETAMINOPHEN 2 TABLET(S): 5; 325 TABLET ORAL at 10:53

## 2019-04-04 RX ADMIN — Medication 650 MILLIGRAM(S): at 22:47

## 2019-04-04 RX ADMIN — GABAPENTIN 300 MILLIGRAM(S): 400 CAPSULE ORAL at 06:40

## 2019-04-04 RX ADMIN — Medication 1 APPLICATION(S): at 12:23

## 2019-04-04 RX ADMIN — MORPHINE SULFATE 2 MILLIGRAM(S): 50 CAPSULE, EXTENDED RELEASE ORAL at 21:00

## 2019-04-04 RX ADMIN — MORPHINE SULFATE 2 MILLIGRAM(S): 50 CAPSULE, EXTENDED RELEASE ORAL at 20:45

## 2019-04-04 RX ADMIN — DAPTOMYCIN 117.6 MILLIGRAM(S): 500 INJECTION, POWDER, LYOPHILIZED, FOR SOLUTION INTRAVENOUS at 14:24

## 2019-04-04 RX ADMIN — SENNA PLUS 2 TABLET(S): 8.6 TABLET ORAL at 21:26

## 2019-04-04 RX ADMIN — Medication 650 MILLIGRAM(S): at 01:37

## 2019-04-04 RX ADMIN — Medication 100 MILLIGRAM(S): at 06:40

## 2019-04-04 RX ADMIN — Medication 650 MILLIGRAM(S): at 00:21

## 2019-04-04 RX ADMIN — Medication 650 MILLIGRAM(S): at 23:17

## 2019-04-04 RX ADMIN — Medication 100 MILLIGRAM(S): at 14:25

## 2019-04-04 RX ADMIN — LIDOCAINE 1 PATCH: 4 CREAM TOPICAL at 00:14

## 2019-04-04 RX ADMIN — HEPARIN SODIUM 5000 UNIT(S): 5000 INJECTION INTRAVENOUS; SUBCUTANEOUS at 21:27

## 2019-04-04 RX ADMIN — OXYCODONE AND ACETAMINOPHEN 2 TABLET(S): 5; 325 TABLET ORAL at 23:59

## 2019-04-04 RX ADMIN — LIDOCAINE 1 PATCH: 4 CREAM TOPICAL at 12:22

## 2019-04-04 RX ADMIN — Medication 1 TABLET(S): at 12:24

## 2019-04-04 RX ADMIN — LIDOCAINE 1 PATCH: 4 CREAM TOPICAL at 17:15

## 2019-04-04 NOTE — CONSULT NOTE ADULT - CONSULT REQUESTED DATE/TIME
03-Apr-2019 09:13
03-Apr-2019 12:05
04-Apr-2019 09:15
24-Mar-2019 00:00
27-Mar-2019 16:29
25-Mar-2019 10:41

## 2019-04-04 NOTE — PROGRESS NOTE ADULT - SUBJECTIVE AND OBJECTIVE BOX
KAMIDANIEL CUEVAS  48y, Female      OVERNIGHT EVENTS:  febrile tm 102.5  continued rash somewhat improving per patient  no labs today    ROS negative except as per above    VITALS:  T(F): 98.8, Max: 102.5 (04-04-19 @ 00:15)  HR: 94  BP: 113/62  RR: 17Vital Signs Last 24 Hrs  T(C): 37.1 (04 Apr 2019 12:51), Max: 39.2 (04 Apr 2019 00:15)  T(F): 98.8 (04 Apr 2019 12:51), Max: 102.5 (04 Apr 2019 00:15)  HR: 94 (04 Apr 2019 12:51) (71 - 107)  BP: 113/62 (04 Apr 2019 12:51) (93/62 - 149/67)  BP(mean): --  RR: 17 (04 Apr 2019 12:51) (17 - 18)  SpO2: --    PHYSICAL EXAM  Gen: Awake and alert  HEENT: NCAT. EOMI. MMM. flushed cheeks  Neck: Supple, no cervical LAD  CV: RRR, no murmurs  Lungs: CTAB, no w/r/r  Abd: Soft. NTND  Extr: wwp, no edema  Skin: erythematous rash on torso UE/LE somewhat decreased, no mucosal involvement  Neuro: No focal deficits  Lines: PICC clean      TESTS & MEASUREMENTS:                        10.7   7.90  )-----------( 415      ( 02 Apr 2019 14:06 )             32.8     04-03    138  |  97<L>  |  21<H>  ----------------------------<  115<H>  4.4   |  24  |  1.8<H>    Ca    8.7      03 Apr 2019 18:05  Phos  4.4     04-03  Mg     1.7     04-03    TPro  5.8<L>  /  Alb  2.9<L>  /  TBili  <0.2  /  DBili  <0.2  /  AST  39  /  ALT  80<H>  /  AlkPhos  197<H>  04-02    LIVER FUNCTIONS - ( 02 Apr 2019 14:06 )  Alb: 2.9 g/dL / Pro: 5.8 g/dL / ALK PHOS: 197 U/L / ALT: 80 U/L / AST: 39 U/L / GGT: x             Culture - Blood (collected 04-02-19 @ 20:00)  Source: .Blood Blood  Preliminary Report (04-04-19 @ 02:02):    No growth to date.    Culture - Blood (collected 03-29-19 @ 06:06)  Source: .Blood None  Final Report (04-03-19 @ 14:01):    No growth at 5 days.          RADIOLOGY & ADDITIONAL TESTS:    ANTIBIOTICS:  cefepime   IVPB   100 mL/Hr IV Intermittent (03-25-19 @ 05:47)   100 mL/Hr IV Intermittent (03-24-19 @ 22:09)   100 mL/Hr IV Intermittent (03-24-19 @ 14:09)   100 mL/Hr IV Intermittent (03-24-19 @ 05:46)   100 mL/Hr IV Intermittent (03-23-19 @ 21:40)   100 mL/Hr IV Intermittent (03-23-19 @ 13:43)   100 mL/Hr IV Intermittent (03-23-19 @ 05:50)   100 mL/Hr IV Intermittent (03-23-19 @ 01:03)    DAPTOmycin IVPB   117.6 mL/Hr IV Intermittent (04-03-19 @ 14:46)    vancomycin  IVPB   166.67 mL/Hr IV Intermittent (03-30-19 @ 06:17)   166.67 mL/Hr IV Intermittent (03-29-19 @ 21:37)    vancomycin  IVPB   166.67 mL/Hr IV Intermittent (04-02-19 @ 22:43)    vancomycin  IVPB   166.67 mL/Hr IV Intermittent (03-28-19 @ 06:03)   166.67 mL/Hr IV Intermittent (03-27-19 @ 21:30)   166.67 mL/Hr IV Intermittent (03-27-19 @ 14:15)   166.67 mL/Hr IV Intermittent (03-27-19 @ 05:12)   166.67 mL/Hr IV Intermittent (03-26-19 @ 21:53)   166.67 mL/Hr IV Intermittent (03-26-19 @ 16:59)    vancomycin  IVPB   250 mL/Hr IV Intermittent (03-26-19 @ 06:24)   250 mL/Hr IV Intermittent (03-25-19 @ 22:08)   250 mL/Hr IV Intermittent (03-25-19 @ 05:51)   250 mL/Hr IV Intermittent (03-24-19 @ 17:08)   250 mL/Hr IV Intermittent (03-24-19 @ 06:29)   250 mL/Hr IV Intermittent (03-23-19 @ 17:14)   250 mL/Hr IV Intermittent (03-23-19 @ 05:50)        DAPTOmycin IVPB 440 milliGRAM(s) IV Intermittent every 24 hours

## 2019-04-04 NOTE — PROGRESS NOTE ADULT - SUBJECTIVE AND OBJECTIVE BOX
Subjective: 48yFemale with a pmhx of POSTOPERATIVE INFECTION  ^POSTOPERATIVE INFECTION  Handoff  MEWS Score  Herniated disc, cervical  Hypertension  No pertinent past medical history  Postoperative infection, unspecified type, initial encounter  Postoperative infection, unspecified type, initial encounter  Incision and drainage, wound, lumbar  Complex incision and drainage of wound infection  H/O lumbar discectomy  S/P SURGERY PAIN/INFECTION  20    POD # 14    S/P Lumbar Wound Washout    Pt seen and examined at bedside w Dr Christensen. Pt had fevers to 101 and 102 overnight. Pt feels tired and weak and somtimes chills      Allergies    No Known Allergies    Intolerances        Vital Signs Last 24 Hrs  T(C): 37.1 (04 Apr 2019 12:51), Max: 39.2 (04 Apr 2019 00:15)  T(F): 98.8 (04 Apr 2019 12:51), Max: 102.5 (04 Apr 2019 00:15)  HR: 94 (04 Apr 2019 12:51) (71 - 107)  BP: 113/62 (04 Apr 2019 12:51) (93/62 - 149/67)  BP(mean): --  RR: 17 (04 Apr 2019 12:51) (17 - 18)  SpO2: --      acetaminophen   Tablet .. 650 milliGRAM(s) Oral every 6 hours PRN  BACItracin   Ointment 1 Application(s) Topical daily  bisacodyl Suppository 10 milliGRAM(s) Rectal daily PRN  cyanocobalamin 1000 MICROGram(s) Oral daily  cyclobenzaprine 10 milliGRAM(s) Oral two times a day PRN  DAPTOmycin IVPB 440 milliGRAM(s) IV Intermittent every 24 hours  docusate sodium 100 milliGRAM(s) Oral three times a day  gabapentin 300 milliGRAM(s) Oral every 8 hours  heparin  Injectable 5000 Unit(s) SubCutaneous every 8 hours  labetalol 100 milliGRAM(s) Oral daily  lidocaine   Patch 1 Patch Transdermal every 24 hours  morphine  - Injectable 2 milliGRAM(s) IV Push every 4 hours PRN  multivitamin 1 Tablet(s) Oral daily  nystatin Powder 1 Application(s) Topical two times a day  ondansetron Injectable 4 milliGRAM(s) IV Push every 6 hours PRN  oxyCODONE    5 mG/acetaminophen 325 mG 1 Tablet(s) Oral every 4 hours PRN  oxyCODONE    5 mG/acetaminophen 325 mG 2 Tablet(s) Oral every 4 hours PRN  pantoprazole    Tablet 40 milliGRAM(s) Oral before breakfast  polyethylene glycol 3350 17 Gram(s) Oral daily  senna 2 Tablet(s) Oral at bedtime  sodium chloride 0.9%. 1000 milliLiter(s) IV Continuous <Continuous>        04-03-19 @ 07:01  -  04-04-19 @ 07:00  --------------------------------------------------------  IN: 0 mL / OUT: 2 mL / NET: -2 mL          Exam:  AAOX3. Verbal function intact  follows commands  diffuse maculopapular rash overlying arms, trunk, legs  Motor: Motor: MAEx4  b/l LE strength 5/5  LLE edema  Sensation: intact       CBC Full  -  ( 04 Apr 2019 04:30 )  WBC Count : 6.75 K/uL  RBC Count : 3.54 M/uL  Hemoglobin : 10.5 g/dL  Hematocrit : 31.7 %  Platelet Count - Automated : 400 K/uL  Mean Cell Volume : 89.5 fL  Mean Cell Hemoglobin : 29.7 pg  Mean Cell Hemoglobin Concentration : 33.1 g/dL    04-04    138  |  100  |  18  ----------------------------<  140<H>  4.7   |  27  |  1.7<H>    Ca    8.4<L>      04 Apr 2019 04:30  Phos  4.0     04-04  Mg     1.6     04-04    TPro  5.8<L>  /  Alb  2.9<L>  /  TBili  <0.2  /  DBili  <0.2  /  AST  39  /  ALT  80<H>  /  AlkPhos  197<H>  04-02          Imaging:  < from: Xray Chest 1 View-PORTABLE IMMEDIATE (04.03.19 @ 11:21) >  Impression:      No radiographic evidence of acute cardiopulmonary disease.          CASA CAMPBELL M.D., ATTENDING RADIOLOGIST  This document has been electronically signed. Apr  3 2019 11:43AM    < end of copied text >    < from: [facility] Duplex Lower Ext Vein Scan, Bilat (04.03.19 @ 10:51) >  Impression:    No evidence of deep venous thrombosis or superficial thrombophlebitis in   the bilateral lower extremities.    ICD-10: M79.89        CASA LORENZO M.D., ATTENDING VASCULAR  This document has been electronically signed. Apr  3 2019 11:41AM        < end of copied text >        Assessment/Plan: as above  full body gallium scan   Cr 1.7, nephrology following  ID following, said to continue dapto  burn following for possible SJS  d/w attending

## 2019-04-04 NOTE — CONSULT NOTE ADULT - SUBJECTIVE AND OBJECTIVE BOX
49 y/o female with hx of L5-S1 Lumbar microdiscectomy. Was admitted on  with back pain radiating to leg and found to have new L4-L5 disc herniation.  Pt underwent L4-S1 Left lumbar discectomy on 2019. In mid March pt went to Dr Christensen for drainage of incision and they cleaned it out pt felt better over the past few days she has felt worse , with subjective fevers . A lot of pain in her LB, on admission pt did not report any radicular pain . Incision site was cleaned by Neurosurgery PA , no drainage at present st that time, small pin hole opening at the bottom of incision. Blood and wound culture grew Methicillin resistant staph epidermitis.  Patient was started on Vanco 1gm q12h which was increased to 1250 q8h on . Dose was held over the weekend when her Vanco trough came back as 33 and on  she developed LUIS when her Cr jo ann to 1.8. Vanco level was drawn and on  was 7 so one more dose of Vanco was given after which her Trough went to 21.      PAST MEDICAL & SURGICAL HISTORY:  Herniated disc, cervical  Hypertension  H/O lumbar discectomy    Allergies:  No Known Allergies    Home Medications Reviewed  Hospital Medications:   MEDICATIONS  (STANDING):  BACItracin   Ointment 1 Application(s) Topical daily  cyanocobalamin 1000 MICROGram(s) Oral daily  DAPTOmycin IVPB 440 milliGRAM(s) IV Intermittent every 24 hours  docusate sodium 100 milliGRAM(s) Oral three times a day  gabapentin 300 milliGRAM(s) Oral every 8 hours  heparin  Injectable 5000 Unit(s) SubCutaneous every 8 hours  labetalol 100 milliGRAM(s) Oral daily  lidocaine   Patch 1 Patch Transdermal every 24 hours  multivitamin 1 Tablet(s) Oral daily  nystatin Powder 1 Application(s) Topical two times a day  pantoprazole    Tablet 40 milliGRAM(s) Oral before breakfast  polyethylene glycol 3350 17 Gram(s) Oral daily  senna 2 Tablet(s) Oral at bedtime  sodium chloride 0.9%. 1000 milliLiter(s) (100 mL/Hr) IV Continuous <Continuous>      SOCIAL HISTORY:  Denies ETOH,Smoking,   FAMILY HISTORY:        REVIEW OF SYSTEMS:  CONSTITUTIONAL: No weakness, fevers or chills  EYES/ENT: No visual changes;  No vertigo or throat pain   NECK: No pain or stiffness  RESPIRATORY: No cough, wheezing, hemoptysis; No shortness of breath  CARDIOVASCULAR: No chest pain or palpitations.  GASTROINTESTINAL: No abdominal or epigastric pain. No nausea, vomiting, or hematemesis; No diarrhea or constipation. No melena or hematochezia.  GENITOURINARY: No dysuria, frequency, foamy urine, urinary urgency, incontinence or hematuria  NEUROLOGICAL: No numbness or weakness  SKIN: No itching, burning, rashes, or lesions   VASCULAR: No bilateral lower extremity edema.   All other review of systems is negative unless indicated above.    VITALS:  T(F): 95.3 (19 @ 06:57), Max: 102.5 (19 @ 00:15)  HR: 71 (19 @ 06:57)  BP: 93/62 (19 @ 06:57)  RR: 18 (19 @ 06:57)  SpO2: --     @ 07:01  -   @ 07:00  --------------------------------------------------------  IN: 0 mL / OUT: 2 mL / NET: -2 mL            I&O's Detail    2019 07:01  -  2019 07:00  --------------------------------------------------------  IN:  Total IN: 0 mL    OUT:    Voided: 2 mL  Total OUT: 2 mL    Total NET: -2 mL        Creatine Kinase, Serum: 53 U/L (19 @ 18:05)      PHYSICAL EXAM:  Constitutional: NAD  HEENT: anicteric sclera, oropharynx clear, MMM  Neck: No JVD  Respiratory: CTAB, no wheezes, rales or rhonchi  Cardiovascular: S1, S2, RRR  Gastrointestinal: BS+, soft, NT/ND  Extremities: No cyanosis or clubbing. No peripheral edema  Neurological: A/O x 3, no focal deficits  Psychiatric: Normal mood, normal affect  : No CVA tenderness. No heard.   Skin: No rashes  Vascular Access:    LABS:      138  |  97<L>  |  21<H>  ----------------------------<  115<H>  4.4   |  24  |  1.8<H>    Ca    8.7      2019 18:05  Phos  4.4     -  Mg     1.7     -    TPro  5.8<L>  /  Alb  2.9<L>  /  TBili  <0.2  /  DBili  <0.2  /  AST  39  /  ALT  80<H>  /  AlkPhos  197<H>      Creatinine Trend: 1.8 <--, 1.8 <--, 1.9 <--, 2.0 <--, 1.8 <--, 1.9 <--, 1.8 <--, 1.9 <--, 1.8 <--, 0.6 <--, 0.5 <--, 0.5 <--, 0.5 <--, 0.5 <--, 0.5 <--                        10.7   7.90  )-----------( 415      ( 2019 14:06 )             32.8     Urine Studies:  Urinalysis Basic - ( 2019 07:20 )    Color: Yellow / Appearance: Cloudy / S.015 / pH:   Gluc:  / Ketone: Negative  / Bili: Negative / Urobili: 0.2 mg/dL   Blood:  / Protein: Trace mg/dL / Nitrite: Negative   Leuk Esterase: Negative / RBC: 1-2 /HPF / WBC 6-10 /HPF   Sq Epi:  / Non Sq Epi: Occasional /HPF / Bacteria:       RADIOLOGY & ADDITIONAL STUDIES:      < from: Xray Chest 1 View-PORTABLE IMMEDIATE (19 @ 11:21) >  Impression:      No radiographic evidence of acute cardiopulmonary disease.    < end of copied text > 49 y/o female with hx of L5-S1 Lumbar microdiscectomy. Was admitted on  with back pain radiating to leg and found to have new L4-L5 disc herniation.  Pt underwent L4-S1 Left lumbar discectomy on 2019. In mid March pt went to Dr Christensen for drainage of incision and they cleaned it out pt felt better over the past few days she has felt worse , with subjective fevers . A lot of pain in her LB, on admission pt did not report any radicular pain . Incision site was cleaned by Neurosurgery PA , no drainage at present st that time, small pin hole opening at the bottom of incision. Blood and wound culture grew Methicillin resistant staph epidermitis.  Patient was started on Vanco 1gm q12h which was increased to 1250 q8h on . Dose was held over the weekend when her Vanco trough came back as 33 and on  she developed LUIS when her Cr jo ann to 1.8. Vanco level was drawn and on  was 7 so one more dose of Vanco was given after which her Trough went to 21.      PAST MEDICAL & SURGICAL HISTORY:  Herniated disc, cervical  Hypertension  H/O lumbar discectomy    Allergies:  No Known Allergies    Home Medications Reviewed  Hospital Medications:   MEDICATIONS  (STANDING):  BACItracin   Ointment 1 Application(s) Topical daily  cyanocobalamin 1000 MICROGram(s) Oral daily  DAPTOmycin IVPB 440 milliGRAM(s) IV Intermittent every 24 hours  docusate sodium 100 milliGRAM(s) Oral three times a day  gabapentin 300 milliGRAM(s) Oral every 8 hours  heparin  Injectable 5000 Unit(s) SubCutaneous every 8 hours  labetalol 100 milliGRAM(s) Oral daily  lidocaine   Patch 1 Patch Transdermal every 24 hours  multivitamin 1 Tablet(s) Oral daily  nystatin Powder 1 Application(s) Topical two times a day  pantoprazole    Tablet 40 milliGRAM(s) Oral before breakfast  polyethylene glycol 3350 17 Gram(s) Oral daily  senna 2 Tablet(s) Oral at bedtime  sodium chloride 0.9%. 1000 milliLiter(s) (100 mL/Hr) IV Continuous <Continuous>      SOCIAL HISTORY:  Denies ETOH,Smoking,   FAMILY HISTORY:        REVIEW OF SYSTEMS:  CONSTITUTIONAL: generalized weakness with fevers and chills  RESPIRATORY: No cough, wheezing, hemoptysis; No shortness of breath  CARDIOVASCULAR: No chest pain or palpitations.  GASTROINTESTINAL: No abdominal or epigastric pain  GENITOURINARY: No dysuria  NEUROLOGICAL: No numbness or weakness  SKIN: diffuse maculopapular rash from chest down to feet. Improving   VASCULAR: LLE swelling 1+  All other review of systems is negative unless indicated above.    VITALS:  T(F): 95.3 (19 @ 06:57), Max: 102.5 (19 @ 00:15)  HR: 71 (19 @ 06:57)  BP: 93/62 (19 @ 06:57)  RR: 18 (19 @ 06:57)  SpO2: --     @ 07:01  -   @ 07:00  --------------------------------------------------------  IN: 0 mL / OUT: 2 mL / NET: -2 mL            I&O's Detail    2019 07:01  -  2019 07:00  --------------------------------------------------------  IN:  Total IN: 0 mL    OUT:    Voided: 2 mL  Total OUT: 2 mL    Total NET: -2 mL        Creatine Kinase, Serum: 53 U/L (19 @ 18:05)      PHYSICAL EXAM:  Constitutional: NAD  HEENT: anicteric sclera  Neck: No JVD  Respiratory: CTAB, no wheezes, rales or rhonchi  Cardiovascular: S1, S2, tachycardia   Gastrointestinal: soft, NT/ND  Extremities: No cyanosis or clubbing. Mild edema in LLE  Neurological: A/O x 3, no focal deficits  Psychiatric: Normal mood, normal affect  : No heard.   Skin: Diffuse maculopapular rash from neck to feet now improved from yesterday       LABS:      138  |  97<L>  |  21<H>  ----------------------------<  115<H>  4.4   |  24  |  1.8<H>    Ca    8.7      2019 18:05  Phos  4.4     04-03  Mg     1.7     04-03    TPro  5.8<L>  /  Alb  2.9<L>  /  TBili  <0.2  /  DBili  <0.2  /  AST  39  /  ALT  80<H>  /  AlkPhos  197<H>  -    Creatinine Trend: 1.8 <--, 1.8 <--, 1.9 <--, 2.0 <--, 1.8 <--, 1.9 <--, 1.8 <--, 1.9 <--, 1.8 <--, 0.6 <--, 0.5 <--, 0.5 <--, 0.5 <--, 0.5 <--, 0.5 <--                        10.7   7.90  )-----------( 415      ( 2019 14:06 )             32.8     Urine Studies:  Urinalysis Basic - ( 2019 07:20 )    Color: Yellow / Appearance: Cloudy / S.015 / pH:   Gluc:  / Ketone: Negative  / Bili: Negative / Urobili: 0.2 mg/dL   Blood:  / Protein: Trace mg/dL / Nitrite: Negative   Leuk Esterase: Negative / RBC: 1-2 /HPF / WBC 6-10 /HPF   Sq Epi:  / Non Sq Epi: Occasional /HPF / Bacteria:       RADIOLOGY & ADDITIONAL STUDIES:      < from: Xray Chest 1 View-PORTABLE IMMEDIATE (19 @ 11:21) >  Impression:      No radiographic evidence of acute cardiopulmonary disease.    < end of copied text >

## 2019-04-04 NOTE — PROGRESS NOTE ADULT - ASSESSMENT
On abx for post laminectomy infection  Also has developed LUIS    Generalized rash past 72 hrs - decreased erythema ; with no significant acute progression and fevers - s  No mucosal involvement or open lesions   Poss SJS- less likely   Rec:  hydration and close monitoring   If condition progresses  would transfer to Burn Unit for definitive treatment   Skin biopsy for confirmation if condition worsens

## 2019-04-04 NOTE — PROGRESS NOTE ADULT - ASSESSMENT
· Assessment		  48yF    Herniated disc, cervical  Hypertension  s/p 2/2019 laminectomy    Admitted with f/c POSTOPERATIVE INFECTION with incisional subcutaneous abscess  Sepsis ruled out on admission  WBC 6.9  S/p OR 3/22  Bcx 322, 23 , 24  with coNS, Bcx 3/26 NTD  WCx also with Staph  TTE no vegetations  Course c/b LUIS renal vanc toxicity (possible component of interstitial nephritis) and likely delayed hypersensitivity to vanc causing rash and fever    - Daptomycin 6mg/kg q24h  - Add on CPK to AM labs for baseline and check CPK weekly to eval for Dapto-related myopathy  - Trend Cr  - Will need 4-6 week IV abx via PICC 48yF    Herniated disc, cervical  Hypertension  s/p 2/2019 laminectomy    Admitted with f/c POSTOPERATIVE INFECTION with incisional subcutaneous abscess found to have s. epi in the wound and bacteremia  Sepsis ruled out on admission  S/p OR 3/22  Bcx 3/22, 23 , 24  with coNS, Bcx 3/26 NTD  WCx also with Staph  TTE no vegetations  Course c/b LUIS renal vanc toxicity (possible component of interstitial nephritis) and likely delayed hypersensitivity to vanc causing rash and fever    - Daptomycin 6mg/kg q24h  - Could send ferritin to r/o Stills as other etiology fever/rash but unlikely as this occurred in the hospital and likely 2/2 vanc   - Trend Cr  - Will need 4-6 week IV abx via PICC

## 2019-04-04 NOTE — CONSULT NOTE ADULT - ATTENDING COMMENTS
Above discussed with pt and  and Neurosurgery Service. Concerns addressed
Pt seen and examiend  Cr has been stable  today's pending  avoid NSAIDS renal toxins
Pt seen and examined  above note reviewed   LUIS ? Vanc toxicity? + toradol  Less likely PIGN (no hematuria)  check C3 C4  trend Cr

## 2019-04-04 NOTE — CONSULT NOTE ADULT - CONSULT REASON
Increasing creatinine while on Vanco
LUIS
Low back pain after surgery
Rash r/o SJS
lumbar infection
gait dysfunction

## 2019-04-05 LAB
ANION GAP SERPL CALC-SCNC: 10 MMOL/L — SIGNIFICANT CHANGE UP (ref 7–14)
BUN SERPL-MCNC: 17 MG/DL — SIGNIFICANT CHANGE UP (ref 10–20)
CALCIUM SERPL-MCNC: 8 MG/DL — LOW (ref 8.5–10.1)
CHLORIDE SERPL-SCNC: 105 MMOL/L — SIGNIFICANT CHANGE UP (ref 98–110)
CO2 SERPL-SCNC: 25 MMOL/L — SIGNIFICANT CHANGE UP (ref 17–32)
CREAT SERPL-MCNC: 1.5 MG/DL — SIGNIFICANT CHANGE UP (ref 0.7–1.5)
GLUCOSE SERPL-MCNC: 112 MG/DL — HIGH (ref 70–99)
POTASSIUM SERPL-MCNC: 4.4 MMOL/L — SIGNIFICANT CHANGE UP (ref 3.5–5)
POTASSIUM SERPL-SCNC: 4.4 MMOL/L — SIGNIFICANT CHANGE UP (ref 3.5–5)
SODIUM SERPL-SCNC: 140 MMOL/L — SIGNIFICANT CHANGE UP (ref 135–146)

## 2019-04-05 PROCEDURE — 78806: CPT | Mod: 26

## 2019-04-05 RX ADMIN — NYSTATIN CREAM 1 APPLICATION(S): 100000 CREAM TOPICAL at 06:40

## 2019-04-05 RX ADMIN — OXYCODONE AND ACETAMINOPHEN 2 TABLET(S): 5; 325 TABLET ORAL at 09:52

## 2019-04-05 RX ADMIN — Medication 100 MILLIGRAM(S): at 06:40

## 2019-04-05 RX ADMIN — SODIUM CHLORIDE 100 MILLILITER(S): 9 INJECTION INTRAMUSCULAR; INTRAVENOUS; SUBCUTANEOUS at 13:53

## 2019-04-05 RX ADMIN — Medication 100 MILLIGRAM(S): at 13:54

## 2019-04-05 RX ADMIN — GABAPENTIN 300 MILLIGRAM(S): 400 CAPSULE ORAL at 13:53

## 2019-04-05 RX ADMIN — OXYCODONE AND ACETAMINOPHEN 1 TABLET(S): 5; 325 TABLET ORAL at 17:36

## 2019-04-05 RX ADMIN — OXYCODONE AND ACETAMINOPHEN 2 TABLET(S): 5; 325 TABLET ORAL at 00:29

## 2019-04-05 RX ADMIN — PREGABALIN 1000 MICROGRAM(S): 225 CAPSULE ORAL at 13:57

## 2019-04-05 RX ADMIN — SENNA PLUS 2 TABLET(S): 8.6 TABLET ORAL at 22:03

## 2019-04-05 RX ADMIN — DAPTOMYCIN 117.6 MILLIGRAM(S): 500 INJECTION, POWDER, LYOPHILIZED, FOR SOLUTION INTRAVENOUS at 13:51

## 2019-04-05 RX ADMIN — GABAPENTIN 300 MILLIGRAM(S): 400 CAPSULE ORAL at 06:40

## 2019-04-05 RX ADMIN — Medication 100 MILLIGRAM(S): at 22:03

## 2019-04-05 RX ADMIN — Medication 1 APPLICATION(S): at 13:57

## 2019-04-05 RX ADMIN — GABAPENTIN 300 MILLIGRAM(S): 400 CAPSULE ORAL at 22:03

## 2019-04-05 RX ADMIN — HEPARIN SODIUM 5000 UNIT(S): 5000 INJECTION INTRAVENOUS; SUBCUTANEOUS at 22:04

## 2019-04-05 RX ADMIN — HEPARIN SODIUM 5000 UNIT(S): 5000 INJECTION INTRAVENOUS; SUBCUTANEOUS at 06:40

## 2019-04-05 RX ADMIN — LIDOCAINE 1 PATCH: 4 CREAM TOPICAL at 13:55

## 2019-04-05 RX ADMIN — Medication 1 TABLET(S): at 13:53

## 2019-04-05 RX ADMIN — MORPHINE SULFATE 2 MILLIGRAM(S): 50 CAPSULE, EXTENDED RELEASE ORAL at 18:23

## 2019-04-05 RX ADMIN — PANTOPRAZOLE SODIUM 40 MILLIGRAM(S): 20 TABLET, DELAYED RELEASE ORAL at 06:40

## 2019-04-05 RX ADMIN — HEPARIN SODIUM 5000 UNIT(S): 5000 INJECTION INTRAVENOUS; SUBCUTANEOUS at 13:53

## 2019-04-05 RX ADMIN — LIDOCAINE 1 PATCH: 4 CREAM TOPICAL at 01:26

## 2019-04-05 RX ADMIN — Medication 650 MILLIGRAM(S): at 19:24

## 2019-04-05 RX ADMIN — OXYCODONE AND ACETAMINOPHEN 2 TABLET(S): 5; 325 TABLET ORAL at 22:03

## 2019-04-05 RX ADMIN — OXYCODONE AND ACETAMINOPHEN 2 TABLET(S): 5; 325 TABLET ORAL at 10:27

## 2019-04-05 NOTE — PROGRESS NOTE ADULT - SUBJECTIVE AND OBJECTIVE BOX
DANIEL KAMI  [id number]      Current Issues: Pt seen and examined at bedside, had fevers to 103 overnight. Pt feels better this morning, was walking around the floor this morning.     HPI:  48 year old female with hx of L4- S1 laminectomy in feb 2019,. pt did well postop .  Last Tuesday pt went to Dr Christensen for drainage of incision and they cleaned it out pt felt better over the past few days she has felt worse , pt with subjective fevers . A lot of pain in her LB ., at present time pt does not report any radicular pain . Incision site was cleaned by Neurosurgery PA , no drainage at present time noted , small pin hole opening at the bottom of incision . (22 Mar 2019 08:45)      PAST MEDICAL & SURGICAL HISTORY:  Herniated disc, cervical  Hypertension  H/O lumbar discectomy    Allergies    No Known Allergies    Home Medications:  biotin 2.5 mg oral tablet: 1 tab(s) orally once a day (22 Mar 2019 08:56)  clindamycin 300 mg oral capsule: 1 cap(s) orally every 6 hours (22 Mar 2019 08:56)  docusate sodium 100 mg oral capsule: 1 cap(s) orally 3 times a day (22 Mar 2019 08:54)  labetalol 100 mg oral tablet: 1 tab(s) orally once a day (22 Mar 2019 08:56)  senna oral tablet: 2 tab(s) orally once a day (at bedtime) (22 Mar 2019 08:54)  Vitamin B12 1000 mcg oral tablet: 1 tab(s) orally once a day (22 Mar 2019 08:56)  Vitamin D3 1000 intl units oral capsule: 1 cap(s) orally once a day (22 Mar 2019 08:56)      MEDICATIONS:  Antibiotics:  DAPTOmycin IVPB 440 milliGRAM(s) IV Intermittent every 24 hours    Neuro:  acetaminophen   Tablet .. 650 milliGRAM(s) Oral every 6 hours PRN  cyclobenzaprine 10 milliGRAM(s) Oral two times a day PRN  gabapentin 300 milliGRAM(s) Oral every 8 hours  morphine  - Injectable 2 milliGRAM(s) IV Push every 4 hours PRN  ondansetron Injectable 4 milliGRAM(s) IV Push every 6 hours PRN  oxyCODONE    5 mG/acetaminophen 325 mG 1 Tablet(s) Oral every 4 hours PRN  oxyCODONE    5 mG/acetaminophen 325 mG 2 Tablet(s) Oral every 4 hours PRN    Anticoagulation:  heparin  Injectable 5000 Unit(s) SubCutaneous every 8 hours    OTHER:  BACItracin   Ointment 1 Application(s) Topical daily  bisacodyl Suppository 10 milliGRAM(s) Rectal daily PRN  docusate sodium 100 milliGRAM(s) Oral three times a day  labetalol 100 milliGRAM(s) Oral daily  lidocaine   Patch 1 Patch Transdermal every 24 hours  nystatin Powder 1 Application(s) Topical two times a day  pantoprazole    Tablet 40 milliGRAM(s) Oral before breakfast  polyethylene glycol 3350 17 Gram(s) Oral daily  senna 2 Tablet(s) Oral at bedtime    IVF:  cyanocobalamin 1000 MICROGram(s) Oral daily  multivitamin 1 Tablet(s) Oral daily  sodium chloride 0.9%. 1000 milliLiter(s) IV Continuous <Continuous>      Vital Signs Last 24 Hrs  T(C): 35.6 (05 Apr 2019 05:28), Max: 39.4 (04 Apr 2019 22:43)  T(F): 96 (05 Apr 2019 05:28), Max: 103 (04 Apr 2019 22:43)  HR: 72 (05 Apr 2019 05:28) (72 - 114)  BP: 125/58 (05 Apr 2019 05:28) (113/62 - 170/81)  RR: 18 (05 Apr 2019 05:28) (16 - 18)    PHYSICAL EXAM:  AAOX3. Verbal function intact  Follows commands  diffuse maculopapular rash overlying arms, trunk, legs   Motor: MAEx4, 5/5 power in b/l UE and LE  Sensation: intact to touch in all extremities        LABS:                        9.9    7.29  )-----------( 371      ( 04 Apr 2019 16:00 )             29.9     04-05    140  |  105  |  17  ----------------------------<  112<H>  4.4   |  25  |  1.5    Ca    8.0<L>      05 Apr 2019 07:38  Phos  4.4     04-04  Mg     1.6     04-04      PT/INR - ( 04 Apr 2019 16:00 )   PT: 13.20 sec;   INR: 1.15 ratio         PTT - ( 04 Apr 2019 16:00 )  PTT:41.3 sec    CULTURES:  Culture Results:   No growth to date. (04-02 @ 20:00)  Culture Results:   No growth at 5 days. (03-29 @ 06:06)      Plan:  F/u full body gallium scan   Creatinine 1.5 improving   will d/w attending             .

## 2019-04-05 NOTE — PROGRESS NOTE ADULT - SUBJECTIVE AND OBJECTIVE BOX
Nephrology progress note    Patient was seen and examined, events over the last 24 h noted .  Cr improving  nonoliguric  Fever o/n     Allergies:  No Known Allergies    Hospital Medications:   MEDICATIONS  (STANDING):  BACItracin   Ointment 1 Application(s) Topical daily  cyanocobalamin 1000 MICROGram(s) Oral daily  DAPTOmycin IVPB 440 milliGRAM(s) IV Intermittent every 24 hours  docusate sodium 100 milliGRAM(s) Oral three times a day  gabapentin 300 milliGRAM(s) Oral every 8 hours  heparin  Injectable 5000 Unit(s) SubCutaneous every 8 hours  labetalol 100 milliGRAM(s) Oral daily  lidocaine   Patch 1 Patch Transdermal every 24 hours  multivitamin 1 Tablet(s) Oral daily  nystatin Powder 1 Application(s) Topical two times a day  pantoprazole    Tablet 40 milliGRAM(s) Oral before breakfast  polyethylene glycol 3350 17 Gram(s) Oral daily  senna 2 Tablet(s) Oral at bedtime  sodium chloride 0.9%. 1000 milliLiter(s) (100 mL/Hr) IV Continuous <Continuous>        VITALS:  T(F): 96 (19 @ 05:28), Max: 103 (19 @ 22:43)  HR: 72 (19 @ 05:28)  BP: 125/58 (19 @ 05:28)  RR: 18 (19 @ 05:28)  SpO2: --  Wt(kg): --     @ 07:01  -   @ 07:00  --------------------------------------------------------  IN: 0 mL / OUT: 2 mL / NET: -2 mL     @ 07:01  -   @ 07:00  --------------------------------------------------------  IN: 1250 mL / OUT: 1 mL / NET: 1249 mL          PHYSICAL EXAM:  Constitutional: NAD  HEENT: anicteric sclera, oropharynx clear, MMM  Neck: No JVD  Respiratory: CTAB, no wheezes, rales or rhonchi  Cardiovascular: S1, S2, RRR  Gastrointestinal: BS+, soft, NT/ND  Extremities: No cyanosis or clubbing. No peripheral edema  :  No heard.   Skin: No rashes    LABS:      140  |  105  |  17  ----------------------------<  112<H>  4.4   |  25  |  1.5    Ca    8.0<L>      2019 07:38  Phos  4.4     04-04  Mg     1.6     04-04                            9.9    7.29  )-----------( 371      ( 2019 16:00 )             29.9       Urine Studies:  Urinalysis Basic - ( 2019 07:20 )    Color: Yellow / Appearance: Cloudy / S.015 / pH:   Gluc:  / Ketone: Negative  / Bili: Negative / Urobili: 0.2 mg/dL   Blood:  / Protein: Trace mg/dL / Nitrite: Negative   Leuk Esterase: Negative / RBC: 1-2 /HPF / WBC 6-10 /HPF   Sq Epi:  / Non Sq Epi: Occasional /HPF / Bacteria:         RADIOLOGY & ADDITIONAL STUDIES:

## 2019-04-05 NOTE — PROGRESS NOTE ADULT - SUBJECTIVE AND OBJECTIVE BOX
KAMIDANIEL  48y, Female      OVERNIGHT EVENTS:  tm 103  decreased rash  back pain  pending gallium  Cr downtrending     ROS negative except as per above    VITALS:  T(F): 97.3, Max: 103 (04-04-19 @ 22:43)  HR: 73  BP: 104/52  RR: 18Vital Signs Last 24 Hrs  T(C): 36.3 (05 Apr 2019 14:41), Max: 39.4 (04 Apr 2019 22:43)  T(F): 97.3 (05 Apr 2019 14:41), Max: 103 (04 Apr 2019 22:43)  HR: 73 (05 Apr 2019 14:41) (72 - 114)  BP: 104/52 (05 Apr 2019 14:41) (104/52 - 170/81)  BP(mean): --  RR: 18 (05 Apr 2019 14:41) (16 - 18)  SpO2: --    PHYSICAL EXAM  Gen: Awake and alert  HEENT: NCAT. EOMI. MMM.  Neck: Supple, no cervical LAD  CV: RRR, no murmurs  Lungs: CTAB, no w/r/r  Abd: Soft. NTND  Extr: wwp, no edema  Skin: decreased erythematous rash on torso UE/LE  no mucosal involvement  Neuro: No focal deficits  Lines: PICC clean        TESTS & MEASUREMENTS:                        9.9    7.29  )-----------( 371      ( 04 Apr 2019 16:00 )             29.9     04-05    140  |  105  |  17  ----------------------------<  112<H>  4.4   |  25  |  1.5    Ca    8.0<L>      05 Apr 2019 07:38  Phos  4.4     04-04  Mg     1.6     04-04          Culture - Blood (collected 04-02-19 @ 20:00)  Source: .Blood Blood  Preliminary Report (04-04-19 @ 02:02):    No growth to date.          RADIOLOGY & ADDITIONAL TESTS:    ANTIBIOTICS:  cefepime   IVPB   100 mL/Hr IV Intermittent (03-25-19 @ 05:47)   100 mL/Hr IV Intermittent (03-24-19 @ 22:09)   100 mL/Hr IV Intermittent (03-24-19 @ 14:09)   100 mL/Hr IV Intermittent (03-24-19 @ 05:46)   100 mL/Hr IV Intermittent (03-23-19 @ 21:40)   100 mL/Hr IV Intermittent (03-23-19 @ 13:43)   100 mL/Hr IV Intermittent (03-23-19 @ 05:50)   100 mL/Hr IV Intermittent (03-23-19 @ 01:03)    DAPTOmycin IVPB   117.6 mL/Hr IV Intermittent (04-05-19 @ 13:51)   117.6 mL/Hr IV Intermittent (04-04-19 @ 14:24)   117.6 mL/Hr IV Intermittent (04-03-19 @ 14:46)    vancomycin  IVPB   166.67 mL/Hr IV Intermittent (03-30-19 @ 06:17)   166.67 mL/Hr IV Intermittent (03-29-19 @ 21:37)    vancomycin  IVPB   166.67 mL/Hr IV Intermittent (04-02-19 @ 22:43)    vancomycin  IVPB   166.67 mL/Hr IV Intermittent (03-28-19 @ 06:03)   166.67 mL/Hr IV Intermittent (03-27-19 @ 21:30)   166.67 mL/Hr IV Intermittent (03-27-19 @ 14:15)   166.67 mL/Hr IV Intermittent (03-27-19 @ 05:12)   166.67 mL/Hr IV Intermittent (03-26-19 @ 21:53)   166.67 mL/Hr IV Intermittent (03-26-19 @ 16:59)    vancomycin  IVPB   250 mL/Hr IV Intermittent (03-26-19 @ 06:24)   250 mL/Hr IV Intermittent (03-25-19 @ 22:08)   250 mL/Hr IV Intermittent (03-25-19 @ 05:51)   250 mL/Hr IV Intermittent (03-24-19 @ 17:08)   250 mL/Hr IV Intermittent (03-24-19 @ 06:29)   250 mL/Hr IV Intermittent (03-23-19 @ 17:14)   250 mL/Hr IV Intermittent (03-23-19 @ 05:50)        DAPTOmycin IVPB 440 milliGRAM(s) IV Intermittent every 24 hours

## 2019-04-05 NOTE — PROGRESS NOTE ADULT - ASSESSMENT
48yF    Herniated disc, cervical  Hypertension  s/p 2/2019 laminectomy    Admitted with f/c POSTOPERATIVE INFECTION with incisional subcutaneous abscess found to have s. epi in the wound and bacteremia  Sepsis ruled out on admission  S/p OR 3/22  Bcx 3/22, 23 , 24  with coNS, Bcx 3/26 NTD  WCx also with Staph  TTE no vegetations  Course c/b LUIS renal vanc toxicity (possible component of interstitial nephritis) and likely delayed hypersensitivity to vanc causing rash and fever    - Daptomycin 6mg/kg q24h  - Could send ferritin to r/o Stills as other etiology fever/rash but unlikely as this occurred in the hospital and likely 2/2 vanc   - Trend Cr  - F/u Gallium  - Will need 4-6 week IV abx via PICC

## 2019-04-05 NOTE — PROGRESS NOTE ADULT - ASSESSMENT
48y F w/ , HTN  Herniated disc, s/p cervical laminectomy 2/2019     Admitted with f/c POSTOPERATIVE INFECTION with incisional subcutaneous abscess found to have s. epi in the wound and bacteremia  S/p OR 3/22  Bcx 3/22, 23 , 24  with coNS, Bcx 3/26 NTD    Course c/b LUIS likely due to  vanc toxicity (also received NSAIDS)      - LUIS resolving Cr slightyl downtrending   - No need for IVF encourage PO intake   - Gallium scan planned   erythema improving, seen by burn "less likely SJS"

## 2019-04-06 LAB
ANION GAP SERPL CALC-SCNC: 14 MMOL/L — SIGNIFICANT CHANGE UP (ref 7–14)
BUN SERPL-MCNC: 14 MG/DL — SIGNIFICANT CHANGE UP (ref 10–20)
CALCIUM SERPL-MCNC: 8.3 MG/DL — LOW (ref 8.5–10.1)
CHLORIDE SERPL-SCNC: 105 MMOL/L — SIGNIFICANT CHANGE UP (ref 98–110)
CK SERPL-CCNC: 48 U/L — SIGNIFICANT CHANGE UP (ref 0–225)
CO2 SERPL-SCNC: 23 MMOL/L — SIGNIFICANT CHANGE UP (ref 17–32)
CREAT SERPL-MCNC: 1.3 MG/DL — SIGNIFICANT CHANGE UP (ref 0.7–1.5)
ERYTHROCYTE [SEDIMENTATION RATE] IN BLOOD: 106 MM/HR — HIGH (ref 0–20)
GLUCOSE SERPL-MCNC: 92 MG/DL — SIGNIFICANT CHANGE UP (ref 70–99)
HCT VFR BLD CALC: 31.7 % — LOW (ref 37–47)
HGB BLD-MCNC: 10.1 G/DL — LOW (ref 12–16)
MCHC RBC-ENTMCNC: 28.9 PG — SIGNIFICANT CHANGE UP (ref 27–31)
MCHC RBC-ENTMCNC: 31.9 G/DL — LOW (ref 32–37)
MCV RBC AUTO: 90.8 FL — SIGNIFICANT CHANGE UP (ref 81–99)
NRBC # BLD: 0 /100 WBCS — SIGNIFICANT CHANGE UP (ref 0–0)
PLATELET # BLD AUTO: 379 K/UL — SIGNIFICANT CHANGE UP (ref 130–400)
POTASSIUM SERPL-MCNC: 4.5 MMOL/L — SIGNIFICANT CHANGE UP (ref 3.5–5)
POTASSIUM SERPL-SCNC: 4.5 MMOL/L — SIGNIFICANT CHANGE UP (ref 3.5–5)
RBC # BLD: 3.49 M/UL — LOW (ref 4.2–5.4)
RBC # FLD: 12.7 % — SIGNIFICANT CHANGE UP (ref 11.5–14.5)
SODIUM SERPL-SCNC: 142 MMOL/L — SIGNIFICANT CHANGE UP (ref 135–146)
WBC # BLD: 7.25 K/UL — SIGNIFICANT CHANGE UP (ref 4.8–10.8)
WBC # FLD AUTO: 7.25 K/UL — SIGNIFICANT CHANGE UP (ref 4.8–10.8)

## 2019-04-06 PROCEDURE — 78807: CPT | Mod: 26

## 2019-04-06 RX ADMIN — SENNA PLUS 2 TABLET(S): 8.6 TABLET ORAL at 22:25

## 2019-04-06 RX ADMIN — Medication 1 TABLET(S): at 11:50

## 2019-04-06 RX ADMIN — OXYCODONE AND ACETAMINOPHEN 2 TABLET(S): 5; 325 TABLET ORAL at 14:00

## 2019-04-06 RX ADMIN — PREGABALIN 1000 MICROGRAM(S): 225 CAPSULE ORAL at 11:50

## 2019-04-06 RX ADMIN — GABAPENTIN 300 MILLIGRAM(S): 400 CAPSULE ORAL at 22:25

## 2019-04-06 RX ADMIN — Medication 1 APPLICATION(S): at 11:50

## 2019-04-06 RX ADMIN — OXYCODONE AND ACETAMINOPHEN 1 TABLET(S): 5; 325 TABLET ORAL at 13:35

## 2019-04-06 RX ADMIN — PANTOPRAZOLE SODIUM 40 MILLIGRAM(S): 20 TABLET, DELAYED RELEASE ORAL at 06:33

## 2019-04-06 RX ADMIN — HEPARIN SODIUM 5000 UNIT(S): 5000 INJECTION INTRAVENOUS; SUBCUTANEOUS at 15:16

## 2019-04-06 RX ADMIN — OXYCODONE AND ACETAMINOPHEN 2 TABLET(S): 5; 325 TABLET ORAL at 23:58

## 2019-04-06 RX ADMIN — DAPTOMYCIN 117.6 MILLIGRAM(S): 500 INJECTION, POWDER, LYOPHILIZED, FOR SOLUTION INTRAVENOUS at 15:17

## 2019-04-06 RX ADMIN — GABAPENTIN 300 MILLIGRAM(S): 400 CAPSULE ORAL at 06:34

## 2019-04-06 RX ADMIN — OXYCODONE AND ACETAMINOPHEN 2 TABLET(S): 5; 325 TABLET ORAL at 22:59

## 2019-04-06 RX ADMIN — GABAPENTIN 300 MILLIGRAM(S): 400 CAPSULE ORAL at 15:16

## 2019-04-06 RX ADMIN — Medication 100 MILLIGRAM(S): at 06:33

## 2019-04-06 RX ADMIN — Medication 100 MILLIGRAM(S): at 22:25

## 2019-04-06 RX ADMIN — OXYCODONE AND ACETAMINOPHEN 2 TABLET(S): 5; 325 TABLET ORAL at 00:40

## 2019-04-06 RX ADMIN — NYSTATIN CREAM 1 APPLICATION(S): 100000 CREAM TOPICAL at 18:07

## 2019-04-06 RX ADMIN — LIDOCAINE 1 PATCH: 4 CREAM TOPICAL at 11:50

## 2019-04-06 RX ADMIN — MORPHINE SULFATE 2 MILLIGRAM(S): 50 CAPSULE, EXTENDED RELEASE ORAL at 05:43

## 2019-04-06 RX ADMIN — HEPARIN SODIUM 5000 UNIT(S): 5000 INJECTION INTRAVENOUS; SUBCUTANEOUS at 22:25

## 2019-04-06 RX ADMIN — Medication 100 MILLIGRAM(S): at 15:16

## 2019-04-06 RX ADMIN — LIDOCAINE 1 PATCH: 4 CREAM TOPICAL at 23:58

## 2019-04-06 RX ADMIN — CYCLOBENZAPRINE HYDROCHLORIDE 10 MILLIGRAM(S): 10 TABLET, FILM COATED ORAL at 07:56

## 2019-04-06 RX ADMIN — Medication 100 MILLIGRAM(S): at 06:34

## 2019-04-06 RX ADMIN — OXYCODONE AND ACETAMINOPHEN 2 TABLET(S): 5; 325 TABLET ORAL at 07:35

## 2019-04-06 RX ADMIN — HEPARIN SODIUM 5000 UNIT(S): 5000 INJECTION INTRAVENOUS; SUBCUTANEOUS at 06:34

## 2019-04-06 RX ADMIN — OXYCODONE AND ACETAMINOPHEN 2 TABLET(S): 5; 325 TABLET ORAL at 08:00

## 2019-04-06 RX ADMIN — NYSTATIN CREAM 1 APPLICATION(S): 100000 CREAM TOPICAL at 06:34

## 2019-04-06 NOTE — PROGRESS NOTE ADULT - SUBJECTIVE AND OBJECTIVE BOX
POD # 16    S/P Lumbar wound washout     pt seen and examined at bedside c/o back pain that fells "tight " like shes having a spasm      Vital Signs Last 24 Hrs  T(C): 37.4 (06 Apr 2019 14:43), Max: 38.3 (05 Apr 2019 22:22)  T(F): 99.3 (06 Apr 2019 14:43), Max: 100.9 (05 Apr 2019 22:22)  HR: 81 (06 Apr 2019 14:43) (81 - 99)  BP: 101/57 (06 Apr 2019 14:43) (101/57 - 137/76)  BP(mean): --  RR: 17 (06 Apr 2019 14:43) (16 - 18)  SpO2: --    PHYSICAL EXAM:    Alert, PERRL  MAEX4   MS equal bilaterally     incision clean dry intact       MEDICATIONS:  Antibiotics:  DAPTOmycin IVPB 440 milliGRAM(s) IV Intermittent every 24 hours    Neuro:  acetaminophen   Tablet .. 650 milliGRAM(s) Oral every 6 hours PRN  cyclobenzaprine 10 milliGRAM(s) Oral two times a day PRN  gabapentin 300 milliGRAM(s) Oral every 8 hours  morphine  - Injectable 2 milliGRAM(s) IV Push every 4 hours PRN  ondansetron Injectable 4 milliGRAM(s) IV Push every 6 hours PRN  oxyCODONE    5 mG/acetaminophen 325 mG 1 Tablet(s) Oral every 4 hours PRN  oxyCODONE    5 mG/acetaminophen 325 mG 2 Tablet(s) Oral every 4 hours PRN    Anticoagulation:  heparin  Injectable 5000 Unit(s) SubCutaneous every 8 hours    OTHER:  BACItracin   Ointment 1 Application(s) Topical daily  bisacodyl Suppository 10 milliGRAM(s) Rectal daily PRN  docusate sodium 100 milliGRAM(s) Oral three times a day  labetalol 100 milliGRAM(s) Oral daily  lidocaine   Patch 1 Patch Transdermal every 24 hours  nystatin Powder 1 Application(s) Topical two times a day  pantoprazole    Tablet 40 milliGRAM(s) Oral before breakfast  polyethylene glycol 3350 17 Gram(s) Oral daily  senna 2 Tablet(s) Oral at bedtime    IVF:  cyanocobalamin 1000 MICROGram(s) Oral daily  multivitamin 1 Tablet(s) Oral daily  sodium chloride 0.9%. 1000 milliLiter(s) IV Continuous <Continuous>        LABS:                        10.1   7.25  )-----------( 379      ( 06 Apr 2019 06:45 )             31.7     04-06    142  |  105  |  14  ----------------------------<  92  4.5   |  23  |  1.3    Ca    8.3<L>      06 Apr 2019 06:45      A/P          S/P Lumbar wound washout                f/u gallium scan results                continue antibiotics ,

## 2019-04-06 NOTE — CHART NOTE - NSCHARTNOTEFT_GEN_A_CORE
Registered Dietitian Follow-Up     Patient Profile Reviewed                           Yes [v]   No []     Nutrition History Previously Obtained        Yes [v]  No []       Pertinent Subjective Information: Vegetarian diet order still pending and patient still continues to rely mostly on foods from home (prefers  foods). However eating well with foods she gets from home, and reports she ate a cheese sandwich for lunch today. I spoke w/ CA covering to ensure that pt receives a vegetarian diet, even if diet order is not activated. I will follow up in 3 days to ensure that pt is receiving multiple vegetarian options.         Pertinent Medical Interventions: Pt admitted for wound infection (s/p L4- S1 laminectomy in Feb 2019). ID following, c/w abx. LUIS resolving as per Nephrology. S/p Gallium scan, results pending. Erythema improving per MD, seen by Burn "less likely SJS".          Diet order: Regular diet     Anthropometrics:  - Ht.   - Wt. 72.6 kg (3/22) no new weights.   - %wt change  - BMI  - IBW     Pertinent Lab Data: 4/6 BMP -unremarkable, GFR- 48     Pertinent Meds: IV abx, oxycodone, docusate sodium, multivitamin, 0.9%NS@100ml/hr      Physical Findings:  - Appearance: alert/oriented; no edema  - GI function: +BM 4/2  - Tubes:  - Oral/Mouth cavity:   - Skin: Intact     Nutrition Requirements  Weight Used: 72.6 kg -  ongoing from 3/29:    Calorie: 7138-8105 kcals/day (MSJ x 1.2-1.3)  Protein: 73-87 g/day (1-1.2 g/kg ABW)  Fluids: 1ml/kcal       Nutrient Intake: PO intake in past 3 days varies 0-80%        [v] Previous Nutrition Diagnosis: Predicted suboptimal energy intake             [v] Ongoing          [] Resolved     Nutrition Intervention: Meals and snacks. Supplements.      Goal/Expected Outcome: Pt to receive vegetarian diet by f/u in 3 days     Indicator/Monitoring: RD to monitor diet order, energy intake    Recommended: Change diet to vegeterian.     Pt remains at risk f/u               _ Registered Dietitian Follow-Up     Patient Profile Reviewed                           Yes [v]   No []     Nutrition History Previously Obtained        Yes [v]  No []       Pertinent Subjective Information: Vegetarian diet order still pending and patient still continues to rely mostly on foods from home (prefers  foods). However eating well with foods she gets from home, and reports she ate a cheese sandwich for lunch today. I spoke w/ CA covering to ensure that pt receives a vegetarian diet, even if diet order is not activated. I will follow up in 3 days to ensure that pt is receiving multiple vegetarian options.         Pertinent Medical Interventions: Pt admitted for wound infection (s/p L4- S1 laminectomy in Feb 2019). ID following, c/w abx. LUIS resolving as per Nephrology. S/p Gallium scan, results pending. Erythema improving per MD, seen by Burn "less likely SJS".          Diet order: Regular diet     Anthropometrics:  - Ht. 165.1  - Wt. 72.6 kg (3/22) no new weights.   - %wt change  - BMI 26.6  - IBW     Pertinent Lab Data: 4/6 BMP -unremarkable, GFR- 48     Pertinent Meds: IV abx, oxycodone, docusate sodium, multivitamin, 0.9%NS@100ml/hr      Physical Findings:  - Appearance: alert/oriented; no edema  - GI function: +BM 4/2  - Tubes:  - Oral/Mouth cavity:   - Skin: surgical incision/ wound infection- Burn following     Nutrition Requirements  Weight Used: 72.6 kg -  ongoing from 3/29:    Calorie: 6242-4280 kcals/day (MSJ x 1.2-1.3)  Protein: 73-87 g/day (1-1.2 g/kg ABW)  Fluids: 1ml/kcal       Nutrient Intake: PO intake in past 3 days varies 0-80%        [v] Previous Nutrition Diagnosis: Predicted suboptimal energy intake             [v] Ongoing          [] Resolved     Nutrition Intervention: Meals and snacks. Supplements.      Goal/Expected Outcome: Pt to receive vegetarian diet by f/u in 3 days     Indicator/Monitoring: RD to monitor diet order, energy intake, labs, body composition, NFPF.    Recommended: Please change diet to vegetarian.     Pt remains at risk f/u Registered Dietitian Follow-Up     Patient Profile Reviewed                           Yes [v]   No []     Nutrition History Previously Obtained        Yes [v]  No []       Pertinent Subjective Information: Pt reports poor to fair appetite, family brings in [place] dishes/food from home however pt is not eating much. As per CA appetite is very poor in the past few days. Pt still on regular diet however CA is aware that pt is vegetarian. Pt is willing to drink Ensure Enlive, RD provided 3 different flavors to try. Case/recommendations were discussed w/ Neurosurgery PA.        Pertinent Medical Interventions: Pt admitted for wound infection (s/p L4- S1 laminectomy in Feb 2019). ID following, c/w abx. LUIS resolving as per Nephrology. S/p Gallium scan, results pending. Erythema improving per MD; seen by Burn "less likely SJS".          Diet order: Regular diet     Anthropometrics:  - Ht. 165.1  - Wt. 72.6 kg (3/22) no new weights.   - %wt change  - BMI 26.6  - IBW 56.8kg      Pertinent Lab Data: 4/6 BMP -unremarkable, GFR- 48     Pertinent Meds: IV abx, oxycodone, docusate sodium, multivitamin, 0.9%NS@100ml/hr      Physical Findings:  - Appearance: alert, oriented, OOB to chair; no edema  - GI function: last BM 4/5  - Tubes:  - Oral/Mouth cavity:   - Skin: surgical incision/ wound infection- Burn following     Nutrition Requirements  Weight Used: 72.6 kg -  ongoing from 3/29:    Calorie: 4270-5086 kcals/day (MSJ x 1.2-1.3)  Protein: 73-87 g/day (1-1.2 g/kg ABW)  Fluids: 1ml/kcal       Nutrient Intake: Pt reports poor to fair appetite, PO intake in past 3 days varies 0-80% as per flow sheets. Per CA pt eats very poorly.         [v] Previous Nutrition Diagnosis: Predicted suboptimal energy intake             [v] Ongoing          [] Resolved     Nutrition Intervention: Meals and snacks. Supplements.      Goal/Expected Outcome: PO intake > 50% meals, snacks and PO supplements in in 3 days     Indicator/Monitoring: RD to monitor diet order, energy intake, labs, body composition, NFPF.    Recommended: Please change diet to vegetarian, add Ensure Enlive q 8hrs (1050kcal, 60gm protein), encourage at meal times. If PO intake won't improve w/in 24-48hrs please consider kcal counts x 3 days. Obtain biweekly weights. RD will follow.       Pt remains at risk f/u

## 2019-04-07 PROCEDURE — 72158 MRI LUMBAR SPINE W/O & W/DYE: CPT | Mod: 26

## 2019-04-07 RX ORDER — LIDOCAINE 4 G/100G
1 CREAM TOPICAL EVERY 24 HOURS
Qty: 0 | Refills: 0 | Status: DISCONTINUED | OUTPATIENT
Start: 2019-04-07 | End: 2019-04-10

## 2019-04-07 RX ORDER — OXYCODONE AND ACETAMINOPHEN 5; 325 MG/1; MG/1
2 TABLET ORAL EVERY 4 HOURS
Qty: 0 | Refills: 0 | Status: DISCONTINUED | OUTPATIENT
Start: 2019-04-07 | End: 2019-04-10

## 2019-04-07 RX ORDER — OXYCODONE AND ACETAMINOPHEN 5; 325 MG/1; MG/1
1 TABLET ORAL EVERY 4 HOURS
Qty: 0 | Refills: 0 | Status: DISCONTINUED | OUTPATIENT
Start: 2019-04-07 | End: 2019-04-10

## 2019-04-07 RX ORDER — MORPHINE SULFATE 50 MG/1
2 CAPSULE, EXTENDED RELEASE ORAL ONCE
Qty: 0 | Refills: 0 | Status: DISCONTINUED | OUTPATIENT
Start: 2019-04-07 | End: 2019-04-07

## 2019-04-07 RX ADMIN — PREGABALIN 1000 MICROGRAM(S): 225 CAPSULE ORAL at 13:51

## 2019-04-07 RX ADMIN — HEPARIN SODIUM 5000 UNIT(S): 5000 INJECTION INTRAVENOUS; SUBCUTANEOUS at 07:03

## 2019-04-07 RX ADMIN — OXYCODONE AND ACETAMINOPHEN 2 TABLET(S): 5; 325 TABLET ORAL at 08:01

## 2019-04-07 RX ADMIN — GABAPENTIN 300 MILLIGRAM(S): 400 CAPSULE ORAL at 22:46

## 2019-04-07 RX ADMIN — HEPARIN SODIUM 5000 UNIT(S): 5000 INJECTION INTRAVENOUS; SUBCUTANEOUS at 22:46

## 2019-04-07 RX ADMIN — GABAPENTIN 300 MILLIGRAM(S): 400 CAPSULE ORAL at 07:03

## 2019-04-07 RX ADMIN — NYSTATIN CREAM 1 APPLICATION(S): 100000 CREAM TOPICAL at 07:05

## 2019-04-07 RX ADMIN — Medication 1 TABLET(S): at 13:51

## 2019-04-07 RX ADMIN — Medication 100 MILLIGRAM(S): at 07:03

## 2019-04-07 RX ADMIN — LIDOCAINE 1 PATCH: 4 CREAM TOPICAL at 16:24

## 2019-04-07 RX ADMIN — Medication 100 MILLIGRAM(S): at 13:50

## 2019-04-07 RX ADMIN — OXYCODONE AND ACETAMINOPHEN 2 TABLET(S): 5; 325 TABLET ORAL at 19:16

## 2019-04-07 RX ADMIN — DAPTOMYCIN 117.6 MILLIGRAM(S): 500 INJECTION, POWDER, LYOPHILIZED, FOR SOLUTION INTRAVENOUS at 13:56

## 2019-04-07 RX ADMIN — MORPHINE SULFATE 2 MILLIGRAM(S): 50 CAPSULE, EXTENDED RELEASE ORAL at 10:54

## 2019-04-07 RX ADMIN — NYSTATIN CREAM 1 APPLICATION(S): 100000 CREAM TOPICAL at 19:15

## 2019-04-07 RX ADMIN — GABAPENTIN 300 MILLIGRAM(S): 400 CAPSULE ORAL at 13:50

## 2019-04-07 RX ADMIN — HEPARIN SODIUM 5000 UNIT(S): 5000 INJECTION INTRAVENOUS; SUBCUTANEOUS at 13:57

## 2019-04-07 RX ADMIN — LIDOCAINE 1 PATCH: 4 CREAM TOPICAL at 13:55

## 2019-04-07 RX ADMIN — Medication 1 APPLICATION(S): at 13:53

## 2019-04-07 RX ADMIN — PANTOPRAZOLE SODIUM 40 MILLIGRAM(S): 20 TABLET, DELAYED RELEASE ORAL at 07:03

## 2019-04-07 NOTE — PROGRESS NOTE ADULT - SUBJECTIVE AND OBJECTIVE BOX
Patient is a 48y old  Female who presents with a chief complaint of wound infection (06 Apr 2019 17:31)      INTERVAL HPI/OVERNIGHT EVENTS:    REVIEW OF SYSTEMS:  CONSTITUTIONAL: No fever, weight loss, or fatigue  EYES: No eye pain, visual disturbances, or discharge  ENMT:  No difficulty hearing, tinnitus, vertigo; No sinus or throat pain  NECK: No pain or stiffness  BREASTS: No pain, masses, or nipple discharge  RESPIRATORY: No cough, wheezing, chills or hemoptysis; No shortness of breath  CARDIOVASCULAR: No chest pain, palpitations, dizziness, or leg swelling  GASTROINTESTINAL: No abdominal or epigastric pain. No nausea, vomiting, or hematemesis; No diarrhea or constipation. No melena or hematochezia.  GENITOURINARY: No dysuria, frequency, hematuria, or incontinence  NEUROLOGICAL: No headaches, memory loss, loss of strength, numbness, or tremors  SKIN: No itching, burning, rashes, or lesions   LYMPH NODES: No enlarged glands  ENDOCRINE: No heat or cold intolerance; No hair loss  MUSCULOSKELETAL: No joint pain or swelling; No muscle, back, or extremity pain  PSYCHIATRIC: No depression, anxiety, mood swings, or difficulty sleeping  HEME/LYMPH: No easy bruising, or bleeding gums  ALLERGY AND IMMUNOLOGIC: No hives or eczema      T(C): 37.4 (04-06-19 @ 14:43), Max: 37.4 (04-06-19 @ 14:43)  HR: 97 (04-06-19 @ 23:10) (81 - 97)  BP: 134/66 (04-06-19 @ 23:10) (101/57 - 134/66)  RR: 18 (04-06-19 @ 23:10) (17 - 18)  SpO2: --    PHYSICAL EXAM:  GENERAL: NAD, well-groomed, well-developed  HEAD:  Atraumatic, Normocephalic  EYES: EOMI, PERRLA, conjunctiva and sclera clear  ENMT: No tonsillar erythema, exudates, or enlargement; Moist mucous membranes, Good dentition, No lesions  NECK: Supple, No JVD, Normal thyroid  NERVOUS SYSTEM:  Alert & Oriented X3, Good concentration; Motor Strength 5/5 B/L upper and lower extremities; DTRs 2+ intact and symmetric  CHEST/LUNG: Clear to percussion bilaterally; No rales, rhonchi, wheezing, or rubs  HEART: Regular rate and rhythm; No murmurs, rubs, or gallops  ABDOMEN: Soft, Nontender, Nondistended; Bowel sounds present  EXTREMITIES:  2+ Peripheral Pulses, No clubbing, cyanosis, or edema  LYMPH: No lymphadenopathy noted  SKIN: No rashes or lesions      MEDICATIONS  (STANDING):  BACItracin   Ointment 1 Application(s) Topical daily  cyanocobalamin 1000 MICROGram(s) Oral daily  DAPTOmycin IVPB 440 milliGRAM(s) IV Intermittent every 24 hours  docusate sodium 100 milliGRAM(s) Oral three times a day  gabapentin 300 milliGRAM(s) Oral every 8 hours  heparin  Injectable 5000 Unit(s) SubCutaneous every 8 hours  labetalol 100 milliGRAM(s) Oral daily  lidocaine   Patch 1 Patch Transdermal every 24 hours  multivitamin 1 Tablet(s) Oral daily  nystatin Powder 1 Application(s) Topical two times a day  pantoprazole    Tablet 40 milliGRAM(s) Oral before breakfast  polyethylene glycol 3350 17 Gram(s) Oral daily  senna 2 Tablet(s) Oral at bedtime  sodium chloride 0.9%. 1000 milliLiter(s) (100 mL/Hr) IV Continuous <Continuous>    MEDICATIONS  (PRN):  acetaminophen   Tablet .. 650 milliGRAM(s) Oral every 6 hours PRN Temp greater or equal to 38C (100.4F)  bisacodyl Suppository 10 milliGRAM(s) Rectal daily PRN Constipation  cyclobenzaprine 10 milliGRAM(s) Oral two times a day PRN Muscle Spasm  ondansetron Injectable 4 milliGRAM(s) IV Push every 6 hours PRN Nausea and/or Vomiting        Allergies    No Known Allergies    Intolerances            LABS:                        10.1   7.25  )-----------( 379      ( 06 Apr 2019 06:45 )             31.7     04-06    142  |  105  |  14  ----------------------------<  92  4.5   |  23  |  1.3    Ca    8.3<L>      06 Apr 2019 06:45          CAPILLARY BLOOD GLUCOSE          RADIOLOGY & ADDITIONAL TESTS:    Imaging Personally Reviewed:  [ ] YES  [ ] NO    Consultant(s) Notes Reviewed:  [ ] YES  [ ] NO Patient is seen and examined at the bed side, is afebrile. The staples from wound has been removed, it looks healing. She is still c/o pain. The  Vancomycin induced  rash almost resolved, and tolerating Daptomycin well.         REVIEW OF SYSTEMS: All other review systems are negative        T(C): 37.4 (04-06-19 @ 14:43), Max: 37.4 (04-06-19 @ 14:43)  HR: 97 (04-06-19 @ 23:10) (81 - 97)  BP: 134/66 (04-06-19 @ 23:10) (101/57 - 134/66)  RR: 18 (04-06-19 @ 23:10) (17 - 18)  SpO2: --        PHYSICAL EXAM:  GENERAL: Not in acute distress  CHEST/LUNG: Air entry  bilaterally  HEART: s1 and s2 resent   ABDOMEN:  Nontender and Nondistended  BACK: Lumber incision sites looks healing  EXTREMITIES:  No pedal  edema  CNS: AAOX3        MEDICATIONS  (STANDING):  BACItracin   Ointment 1 Application(s) Topical daily  cyanocobalamin 1000 MICROGram(s) Oral daily  DAPTOmycin IVPB 440 milliGRAM(s) IV Intermittent every 24 hours  docusate sodium 100 milliGRAM(s) Oral three times a day  gabapentin 300 milliGRAM(s) Oral every 8 hours  heparin  Injectable 5000 Unit(s) SubCutaneous every 8 hours  labetalol 100 milliGRAM(s) Oral daily  lidocaine   Patch 1 Patch Transdermal every 24 hours  multivitamin 1 Tablet(s) Oral daily  nystatin Powder 1 Application(s) Topical two times a day  pantoprazole    Tablet 40 milliGRAM(s) Oral before breakfast  polyethylene glycol 3350 17 Gram(s) Oral daily  senna 2 Tablet(s) Oral at bedtime  sodium chloride 0.9%. 1000 milliLiter(s) (100 mL/Hr) IV Continuous <Continuous>    MEDICATIONS  (PRN):  acetaminophen   Tablet .. 650 milliGRAM(s) Oral every 6 hours PRN Temp greater or equal to 38C (100.4F)  bisacodyl Suppository 10 milliGRAM(s) Rectal daily PRN Constipation  cyclobenzaprine 10 milliGRAM(s) Oral two times a day PRN Muscle Spasm  ondansetron Injectable 4 milliGRAM(s) IV Push every 6 hours PRN Nausea and/or Vomiting        Allergies    No Known Allergies        LABS:                        10.1   7.25  )-----------( 379      ( 06 Apr 2019 06:45 )             31.7         04-06    142  |  105  |  14  ----------------------------<  92  4.5   |  23  |  1.3    Ca    8.3<L>      06 Apr 2019 06:45        RADIOLOGY & ADDITIONAL TESTS:    < from: NM Inflammatory Loc Wholebody, Gallium (04.05.19 @ 15:58) >    1. Linear increased uptake is seen in the left lower lung field,   correlate with the recent chest x-ray.    2.. Linear focal increased uptake is seen in the lower lumbar spine.    These findings will be further evaluated in the delayed imaging including   the SPECT study of the chest and lumbar spine will be performed.    < end of copied text >

## 2019-04-07 NOTE — PROGRESS NOTE ADULT - ASSESSMENT
48yF    Herniated disc, cervical  Hypertension  s/p 2/2019 laminectomy    Admitted with f/c POSTOPERATIVE INFECTION with incisional subcutaneous abscess found to have s. epi in the wound and bacteremia  Sepsis ruled out on admission  S/p OR 3/22  Bcx 3/22, 23 , 24  with coNS, Bcx 3/26 NTD  WCx also with Staph  TTE no vegetations  Course c/b LUIS renal vanc toxicity (possible component of interstitial nephritis) and likely delayed hypersensitivity to vanc causing rash and fever    - Daptomycin 6mg/kg q24h  - Could send ferritin to r/o Stills as other etiology fever/rash but unlikely as this occurred in the hospital and likely 2/2 vanc   - Trend Cr  - F/u Gallium  - Will need 4-6 week IV abx via PICC 48yF    Herniated disc, Lumbar  Hypertension  s/p 2/2019 laminectomy    Admitted with f/c POSTOPERATIVE INFECTION with incisional subcutaneous abscess found to have s. epi in the wound and bacteremia  Sepsis ruled out on admission  S/p OR 3/22  Bcx 3/22, 23 , 24  with coNS, Bcx 3/26 NTD  WCx also with Staph  TTE no vegetations  Course c/b LUIS renal vanc toxicity (possible component of interstitial nephritis) and likely delayed hypersensitivity to vanc causing rash and fever      would recommend:    1. Continue  Daptomycin 6mg/kg q24h  2. Pain management as needed  3. Follow up MRI result  4.  Will need 4-6 week IV abx via PICC    d/w patient , family at the bed side and Nursing staff

## 2019-04-07 NOTE — PROGRESS NOTE ADULT - SUBJECTIVE AND OBJECTIVE BOX
Nephrology progress note    Patient is seen and examined, events over the last 24 h noted .    Allergies:  No Known Allergies    Hospital Medications:   MEDICATIONS  (STANDING):  BACItracin   Ointment 1 Application(s) Topical daily  cyanocobalamin 1000 MICROGram(s) Oral daily  DAPTOmycin IVPB 440 milliGRAM(s) IV Intermittent every 24 hours  docusate sodium 100 milliGRAM(s) Oral three times a day  gabapentin 300 milliGRAM(s) Oral every 8 hours  heparin  Injectable 5000 Unit(s) SubCutaneous every 8 hours  labetalol 100 milliGRAM(s) Oral daily  lidocaine   Patch 1 Patch Transdermal every 24 hours  multivitamin 1 Tablet(s) Oral daily  nystatin Powder 1 Application(s) Topical two times a day  pantoprazole    Tablet 40 milliGRAM(s) Oral before breakfast  polyethylene glycol 3350 17 Gram(s) Oral daily  senna 2 Tablet(s) Oral at bedtime  sodium chloride 0.9%. 1000 milliLiter(s) (100 mL/Hr) IV Continuous <Continuous>        VITALS:  T(F): 96 (19 @ 14:00), Max: 100.9 (19 @ 23:10)  HR: 78 (19 @ 14:00)  BP: 115/56 (19 @ 14:00)  RR: 18 (19 @ 14:00)  SpO2: --  Wt(kg): --     @ 07:01  -   @ 07:00  --------------------------------------------------------  IN: 850 mL / OUT: 0 mL / NET: 850 mL          PHYSICAL EXAM:  Constitutional: NAD  HEENT: anicteric sclera, oropharynx clear, MMM  Neck: No JVD  Respiratory: CTAB, no wheezes, rales or rhonchi  Cardiovascular: S1, S2, RRR  Gastrointestinal: BS+, soft, NT/ND  Extremities: No cyanosis or clubbing. No peripheral edema  :  No heard.   Skin: No rashes    LABS:      142  |  105  |  14  ----------------------------<  92  4.5   |  23  |  1.3    Ca    8.3<L>      2019 06:45                            10.1   7.25  )-----------( 379      ( 2019 06:45 )             31.7       Urine Studies:  Urinalysis Basic - ( 2019 07:20 )    Color: Yellow / Appearance: Cloudy / S.015 / pH:   Gluc:  / Ketone: Negative  / Bili: Negative / Urobili: 0.2 mg/dL   Blood:  / Protein: Trace mg/dL / Nitrite: Negative   Leuk Esterase: Negative / RBC: 1-2 /HPF / WBC 6-10 /HPF   Sq Epi:  / Non Sq Epi: Occasional /HPF / Bacteria:         RADIOLOGY & ADDITIONAL STUDIES:

## 2019-04-07 NOTE — PROGRESS NOTE ADULT - SUBJECTIVE AND OBJECTIVE BOX
Subjective: back pain  s/p washout of wound  T(C): 35.6 (04-07-19 @ 07:06), Max: 38.3 (04-06-19 @ 23:10)  HR: 76 (04-07-19 @ 07:06) (76 - 97)  BP: 140/78 (04-07-19 @ 07:06) (134/66 - 140/78)  RR: 18 (04-07-19 @ 07:06) (18 - 18)  SpO2: --  Wt(kg): --    Exam: patient sitting in chair, NAD, GREGORIO seen by Dr Christensen, d/w patient and family MRI results    CBC Full  -  ( 06 Apr 2019 06:45 )  WBC Count : 7.25 K/uL  RBC Count : 3.49 M/uL  Hemoglobin : 10.1 g/dL  Hematocrit : 31.7 %  Platelet Count - Automated : 379 K/uL  Mean Cell Volume : 90.8 fL  Mean Cell Hemoglobin : 28.9 pg  Mean Cell Hemoglobin Concentration : 31.9 g/dL  Auto Neutrophil # : x  Auto Lymphocyte # : x  Auto Monocyte # : x  Auto Eosinophil # : x  Auto Basophil # : x  Auto Neutrophil % : x  Auto Lymphocyte % : x  Auto Monocyte % : x  Auto Eosinophil % : x  Auto Basophil % : x    04-06    142  |  105  |  14  ----------------------------<  92  4.5   |  23  |  1.3    Ca    8.3<L>      06 Apr 2019 06:45      Imaging: < from: MR Lumbar Spine w/wo IV Cont (04.07.19 @ 12:05) >  Impression:    1. Patient again noted to be status post left hemilaminectomy L4-L5 and   L5-S1. Decreased fluid filling the laminectomy defects and the prior exam.    2. Increased significant edema involving the left paraspinal muscles at   the level of L4 and L5 without fluid collection.    3. Development of bone marrow edema and endplate erosions across the   L4-L5 intervertebral disc space with slight interval loss of disc space   height. Findings are suspicious for developing discitis/osteomyelitis at   this level. Slight worsening disc herniation at that level with moderate   spinal canal stenosis.        < end of copied text >      Assessment- as above      Plan: hot packs, lidoderm patch to left back

## 2019-04-07 NOTE — PROGRESS NOTE ADULT - ASSESSMENT
discussed with fellow.    infection post laminectomy. antibiotics noted. creatinine stable. no nephrology intervention at this point.  will sign off. please recall if necessary.

## 2019-04-08 LAB
CULTURE RESULTS: SIGNIFICANT CHANGE UP
SPECIMEN SOURCE: SIGNIFICANT CHANGE UP

## 2019-04-08 RX ADMIN — HEPARIN SODIUM 5000 UNIT(S): 5000 INJECTION INTRAVENOUS; SUBCUTANEOUS at 21:33

## 2019-04-08 RX ADMIN — NYSTATIN CREAM 1 APPLICATION(S): 100000 CREAM TOPICAL at 06:32

## 2019-04-08 RX ADMIN — NYSTATIN CREAM 1 APPLICATION(S): 100000 CREAM TOPICAL at 16:51

## 2019-04-08 RX ADMIN — GABAPENTIN 300 MILLIGRAM(S): 400 CAPSULE ORAL at 21:33

## 2019-04-08 RX ADMIN — LIDOCAINE 1 PATCH: 4 CREAM TOPICAL at 15:46

## 2019-04-08 RX ADMIN — GABAPENTIN 300 MILLIGRAM(S): 400 CAPSULE ORAL at 15:45

## 2019-04-08 RX ADMIN — DAPTOMYCIN 117.6 MILLIGRAM(S): 500 INJECTION, POWDER, LYOPHILIZED, FOR SOLUTION INTRAVENOUS at 15:44

## 2019-04-08 RX ADMIN — GABAPENTIN 300 MILLIGRAM(S): 400 CAPSULE ORAL at 06:29

## 2019-04-08 RX ADMIN — Medication 1 TABLET(S): at 12:34

## 2019-04-08 RX ADMIN — Medication 100 MILLIGRAM(S): at 06:30

## 2019-04-08 RX ADMIN — POLYETHYLENE GLYCOL 3350 17 GRAM(S): 17 POWDER, FOR SOLUTION ORAL at 12:36

## 2019-04-08 RX ADMIN — Medication 1 APPLICATION(S): at 12:34

## 2019-04-08 RX ADMIN — LIDOCAINE 1 PATCH: 4 CREAM TOPICAL at 19:45

## 2019-04-08 RX ADMIN — PANTOPRAZOLE SODIUM 40 MILLIGRAM(S): 20 TABLET, DELAYED RELEASE ORAL at 06:30

## 2019-04-08 RX ADMIN — PREGABALIN 1000 MICROGRAM(S): 225 CAPSULE ORAL at 12:35

## 2019-04-08 RX ADMIN — Medication 100 MILLIGRAM(S): at 21:33

## 2019-04-08 RX ADMIN — HEPARIN SODIUM 5000 UNIT(S): 5000 INJECTION INTRAVENOUS; SUBCUTANEOUS at 15:45

## 2019-04-08 RX ADMIN — HEPARIN SODIUM 5000 UNIT(S): 5000 INJECTION INTRAVENOUS; SUBCUTANEOUS at 06:32

## 2019-04-08 RX ADMIN — SODIUM CHLORIDE 100 MILLILITER(S): 9 INJECTION INTRAMUSCULAR; INTRAVENOUS; SUBCUTANEOUS at 06:37

## 2019-04-08 RX ADMIN — Medication 100 MILLIGRAM(S): at 15:45

## 2019-04-08 RX ADMIN — OXYCODONE AND ACETAMINOPHEN 2 TABLET(S): 5; 325 TABLET ORAL at 10:42

## 2019-04-08 RX ADMIN — SENNA PLUS 2 TABLET(S): 8.6 TABLET ORAL at 21:33

## 2019-04-08 NOTE — PROGRESS NOTE ADULT - SUBJECTIVE AND OBJECTIVE BOX
KAMI, DANIEL  48y, Female      OVERNIGHT EVENTS:  improved rash, Cr  afebrile    ROS negative except as per above    VITALS:  T(F): 97.3, Max: 97.5 (04-07-19 @ 22:20)  HR: 83  BP: 145/71  RR: 16Vital Signs Last 24 Hrs  T(C): 36.3 (08 Apr 2019 05:35), Max: 36.4 (07 Apr 2019 22:20)  T(F): 97.3 (08 Apr 2019 05:35), Max: 97.5 (07 Apr 2019 22:20)  HR: 83 (08 Apr 2019 05:35) (78 - 87)  BP: 145/71 (08 Apr 2019 05:35) (115/56 - 145/71)  BP(mean): --  RR: 16 (08 Apr 2019 05:35) (16 - 18)  SpO2: --    PHYSICAL EXAM  Gen: Awake and alert  HEENT: NCAT. EOMI. MMM.  Neck: Supple, no cervical LAD  CV: RRR, no murmurs  Lungs: CTAB, no w/r/r  Abd: Soft. NTND  Extr: wwp, no edema  Skin: improved rash  Neuro: No focal deficits  Lines: PICC clean    TESTS & MEASUREMENTS:              Culture - Blood (collected 04-04-19 @ 16:00)  Source: .Blood Blood  Preliminary Report (04-06-19 @ 01:01):    No growth to date.    Culture - Blood (collected 04-02-19 @ 20:00)  Source: .Blood Blood  Final Report (04-08-19 @ 02:04):    No growth at 5 days.          RADIOLOGY & ADDITIONAL TESTS:    ANTIBIOTICS:  cefepime   IVPB   100 mL/Hr IV Intermittent (03-25-19 @ 05:47)   100 mL/Hr IV Intermittent (03-24-19 @ 22:09)   100 mL/Hr IV Intermittent (03-24-19 @ 14:09)   100 mL/Hr IV Intermittent (03-24-19 @ 05:46)   100 mL/Hr IV Intermittent (03-23-19 @ 21:40)   100 mL/Hr IV Intermittent (03-23-19 @ 13:43)   100 mL/Hr IV Intermittent (03-23-19 @ 05:50)   100 mL/Hr IV Intermittent (03-23-19 @ 01:03)    DAPTOmycin IVPB   117.6 mL/Hr IV Intermittent (04-07-19 @ 13:56)   117.6 mL/Hr IV Intermittent (04-06-19 @ 15:17)   117.6 mL/Hr IV Intermittent (04-05-19 @ 13:51)   117.6 mL/Hr IV Intermittent (04-04-19 @ 14:24)   117.6 mL/Hr IV Intermittent (04-03-19 @ 14:46)    vancomycin  IVPB   166.67 mL/Hr IV Intermittent (03-30-19 @ 06:17)   166.67 mL/Hr IV Intermittent (03-29-19 @ 21:37)    vancomycin  IVPB   166.67 mL/Hr IV Intermittent (03-28-19 @ 06:03)   166.67 mL/Hr IV Intermittent (03-27-19 @ 21:30)   166.67 mL/Hr IV Intermittent (03-27-19 @ 14:15)   166.67 mL/Hr IV Intermittent (03-27-19 @ 05:12)   166.67 mL/Hr IV Intermittent (03-26-19 @ 21:53)   166.67 mL/Hr IV Intermittent (03-26-19 @ 16:59)    vancomycin  IVPB   250 mL/Hr IV Intermittent (03-26-19 @ 06:24)   250 mL/Hr IV Intermittent (03-25-19 @ 22:08)   250 mL/Hr IV Intermittent (03-25-19 @ 05:51)   250 mL/Hr IV Intermittent (03-24-19 @ 17:08)   250 mL/Hr IV Intermittent (03-24-19 @ 06:29)   250 mL/Hr IV Intermittent (03-23-19 @ 17:14)   250 mL/Hr IV Intermittent (03-23-19 @ 05:50)    vancomycin  IVPB   166.67 mL/Hr IV Intermittent (04-02-19 @ 22:43)        DAPTOmycin IVPB 440 milliGRAM(s) IV Intermittent every 24 hours

## 2019-04-08 NOTE — PROGRESS NOTE ADULT - SUBJECTIVE AND OBJECTIVE BOX
POD #17     S/P Wound Washout    Pt seen and examined at bedside. Pt c/o Left paraspinal pain. Denies radiculopathy. Pt has been ambulating with walker. Sister at bedside.     Vital Signs Last 24 Hrs  T(C): 36.3 (08 Apr 2019 05:35), Max: 36.4 (07 Apr 2019 22:20)  T(F): 97.3 (08 Apr 2019 05:35), Max: 97.5 (07 Apr 2019 22:20)  HR: 83 (08 Apr 2019 05:35) (78 - 87)  BP: 145/71 (08 Apr 2019 05:35) (115/56 - 145/71)  BP(mean): --  RR: 16 (08 Apr 2019 05:35) (16 - 18)  SpO2: --    PHYSICAL EXAM:  Strength 5/5  + Sensation to light touch  Incision C/D/I - healing well    MEDICATIONS:  Antibiotics:  DAPTOmycin IVPB 440 milliGRAM(s) IV Intermittent every 24 hours    Neuro:  acetaminophen   Tablet .. 650 milliGRAM(s) Oral every 6 hours PRN  cyclobenzaprine 10 milliGRAM(s) Oral two times a day PRN  gabapentin 300 milliGRAM(s) Oral every 8 hours  ondansetron Injectable 4 milliGRAM(s) IV Push every 6 hours PRN  oxyCODONE    5 mG/acetaminophen 325 mG 1 Tablet(s) Oral every 4 hours PRN  oxyCODONE    5 mG/acetaminophen 325 mG 2 Tablet(s) Oral every 4 hours PRN    Anticoagulation:  heparin  Injectable 5000 Unit(s) SubCutaneous every 8 hours    OTHER:  BACItracin   Ointment 1 Application(s) Topical daily  bisacodyl Suppository 10 milliGRAM(s) Rectal daily PRN  docusate sodium 100 milliGRAM(s) Oral three times a day  labetalol 100 milliGRAM(s) Oral daily  lidocaine   Patch 1 Patch Transdermal every 24 hours  nystatin Powder 1 Application(s) Topical two times a day  pantoprazole    Tablet 40 milliGRAM(s) Oral before breakfast  polyethylene glycol 3350 17 Gram(s) Oral daily  senna 2 Tablet(s) Oral at bedtime    IVF:  cyanocobalamin 1000 MICROGram(s) Oral daily  multivitamin 1 Tablet(s) Oral daily  sodium chloride 0.9%. 1000 milliLiter(s) IV Continuous <Continuous>    Assessment:  As above    Plan:  SW for D/C Planning  PT  Pain Meds PRN

## 2019-04-08 NOTE — PROGRESS NOTE ADULT - ASSESSMENT
48yF    Herniated disc, cervical  Hypertension  s/p 2/2019 laminectomy    Admitted with f/c POSTOPERATIVE INFECTION with incisional subcutaneous abscess found to have s. epi in the wound and bacteremia  Sepsis ruled out on admission  S/p OR 3/22  Bcx 3/22, 23 , 24  with coNS, Bcx 3/26 NTD  WCx also with Staph  TTE no vegetations  Course c/b LUIS renal vanc toxicity; now improved, (possible component of interstitial nephritis) and likely delayed hypersensitivity to vanc causing rash and fever    Clinically improving    - Daptomycin 6mg/kg q24h  - Will need 6 week IV abx via PICC (end 5/6/19)  - Weekly CBC, BMP, CPK  - ID follow-up [address] [phone]

## 2019-04-09 RX ORDER — DICLOFENAC SODIUM 30 MG/G
2 GEL TOPICAL EVERY 8 HOURS
Qty: 0 | Refills: 0 | Status: DISCONTINUED | OUTPATIENT
Start: 2019-04-09 | End: 2019-04-10

## 2019-04-09 RX ADMIN — Medication 100 MILLIGRAM(S): at 14:23

## 2019-04-09 RX ADMIN — OXYCODONE AND ACETAMINOPHEN 2 TABLET(S): 5; 325 TABLET ORAL at 17:07

## 2019-04-09 RX ADMIN — Medication 1 APPLICATION(S): at 14:35

## 2019-04-09 RX ADMIN — POLYETHYLENE GLYCOL 3350 17 GRAM(S): 17 POWDER, FOR SOLUTION ORAL at 12:13

## 2019-04-09 RX ADMIN — GABAPENTIN 300 MILLIGRAM(S): 400 CAPSULE ORAL at 14:24

## 2019-04-09 RX ADMIN — PANTOPRAZOLE SODIUM 40 MILLIGRAM(S): 20 TABLET, DELAYED RELEASE ORAL at 05:07

## 2019-04-09 RX ADMIN — Medication 100 MILLIGRAM(S): at 05:07

## 2019-04-09 RX ADMIN — DICLOFENAC SODIUM 2 GRAM(S): 30 GEL TOPICAL at 14:22

## 2019-04-09 RX ADMIN — HEPARIN SODIUM 5000 UNIT(S): 5000 INJECTION INTRAVENOUS; SUBCUTANEOUS at 05:07

## 2019-04-09 RX ADMIN — DAPTOMYCIN 117.6 MILLIGRAM(S): 500 INJECTION, POWDER, LYOPHILIZED, FOR SOLUTION INTRAVENOUS at 14:22

## 2019-04-09 RX ADMIN — GABAPENTIN 300 MILLIGRAM(S): 400 CAPSULE ORAL at 05:07

## 2019-04-09 RX ADMIN — LIDOCAINE 1 PATCH: 4 CREAM TOPICAL at 00:06

## 2019-04-09 RX ADMIN — GABAPENTIN 300 MILLIGRAM(S): 400 CAPSULE ORAL at 22:19

## 2019-04-09 RX ADMIN — NYSTATIN CREAM 1 APPLICATION(S): 100000 CREAM TOPICAL at 18:22

## 2019-04-09 RX ADMIN — Medication 100 MILLIGRAM(S): at 22:19

## 2019-04-09 RX ADMIN — Medication 1 TABLET(S): at 12:13

## 2019-04-09 RX ADMIN — DICLOFENAC SODIUM 2 GRAM(S): 30 GEL TOPICAL at 22:21

## 2019-04-09 RX ADMIN — PREGABALIN 1000 MICROGRAM(S): 225 CAPSULE ORAL at 12:12

## 2019-04-09 RX ADMIN — HEPARIN SODIUM 5000 UNIT(S): 5000 INJECTION INTRAVENOUS; SUBCUTANEOUS at 22:19

## 2019-04-09 RX ADMIN — HEPARIN SODIUM 5000 UNIT(S): 5000 INJECTION INTRAVENOUS; SUBCUTANEOUS at 14:24

## 2019-04-09 RX ADMIN — LIDOCAINE 1 PATCH: 4 CREAM TOPICAL at 15:33

## 2019-04-09 NOTE — PROGRESS NOTE ADULT - ASSESSMENT
48yF    Herniated disc, cervical  Hypertension  s/p 2/2019 laminectomy    Admitted with f/c POSTOPERATIVE INFECTION with incisional subcutaneous abscess found to have s. epi in the wound and bacteremia  Sepsis ruled out on admission  S/p OR 3/22  Bcx 3/22, 23 , 24  with coNS, Bcx 3/26 NTD  WCx also with Staph  TTE no vegetations  Course c/b LUIS renal vanc toxicity; now improved, (possible component of interstitial nephritis) and likely delayed hypersensitivity to vanc causing rash and fever    Clinically improving    - Daptomycin 6mg/kg q24h (CPK wnl 4/6)  - Will need at least 6 week IV abx via PICC (end 5/6/19)  - Weekly CBC, BMP, CPK  - ID follow-up [address] [phone] (Pt may try to f/u in [facility])  - Final treatment course dependent on repeat OP MRI, ESR/CRP

## 2019-04-09 NOTE — CHART NOTE - NSCHARTNOTEFT_GEN_A_CORE
Registered Dietitian Follow-Up     Patient Profile Reviewed                           Yes [v]   No []     Nutrition History Previously Obtained        Yes [v]  No []       Pertinent Subjective Information: Pt reports she continues with small appetite/PO intake, family brings in [place] dishes/food from home however pt is not eating much. Reports she is eating "a little bit" of each meal. Received Ensure ENlive for the first time today and plans to drink them. Encouraged her to do so if skipping meals especially. Pt receptive. WIll follow up in 3 days to see how she does with them.  P      Pertinent Medical Interventions: Pt admitted for wound infection (s/p L4- S1 laminectomy in Feb 2019). ID following, c/w abx. LUIS resolving as per Nephrology. S/p Gallium scan, results pending. Erythema improving per MD; seen by Burn "less likely SJS".          Diet order: Regular diet, Lacto-Ovo Veg, Ensure Enlive TID     Anthropometrics:  - Ht. 165.1  - Wt. 72.6 kg (3/22) no new weights.   - %wt change  - BMI 26.6  - IBW 56.8kg      Pertinent Lab Data: no new labs      Pertinent Meds: heparin, IV abx, oxycodone, docusate sodium, multivitamin, ondansetron     Physical Findings:  - Appearance: alert, oriented, OOB to chair; no edema  - GI function: last BM 4/8  - Tubes:  - Oral/Mouth cavity: no problems noted  - Skin: surgical incision/ wound infection- Burn following     Nutrition Requirements  Weight Used: 72.6 kg -  ongoing from 3/29:    Calorie: 8165-9758 kcals/day (MSJ x 1.2-1.3)  Protein: 73-87 g/day (1-1.2 g/kg ABW)  Fluids: 1ml/kcal       Nutrient Intake: Pt intake varies- eating some food from home and picking at  meals here; plans to start drinking Ensure Enlive        [v] Previous Nutrition Diagnosis: Predicted suboptimal energy intake             [v] Ongoing          [] Resolved     Nutrition Intervention: Meals and snacks. Supplements.      Goal/Expected Outcome: PO intake > 50% meals, snacks and PO supplements in in 3 days     Indicator/Monitoring: RD to monitor energy intake, labs, body composition, NFPF.    Recommended: Continue current diet    Pt remains at risk f/u.

## 2019-04-09 NOTE — PROGRESS NOTE ADULT - SUBJECTIVE AND OBJECTIVE BOX
KAMI, DANIEL  48y, Female      OVERNIGHT EVENTS:  no acute events overnight    ROS negative except as per above    VITALS:  T(F): 98.5, Max: 98.9 (04-08-19 @ 21:28)  HR: 82  BP: 145/76  RR: 18Vital Signs Last 24 Hrs  T(C): 36.9 (09 Apr 2019 06:49), Max: 37.2 (08 Apr 2019 21:28)  T(F): 98.5 (09 Apr 2019 06:49), Max: 98.9 (08 Apr 2019 21:28)  HR: 82 (09 Apr 2019 06:49) (81 - 83)  BP: 145/76 (09 Apr 2019 06:49) (104/68 - 145/76)  BP(mean): --  RR: 18 (09 Apr 2019 06:49) (18 - 18)  SpO2: --    PHYSICAL EXAM  Gen: Awake and alert  HEENT: NCAT. EOMI. MMM.  Neck: Supple, no cervical LAD  CV: RRR, no murmurs  Lungs: CTAB, no w/r/r  Abd: Soft. NTND  Extr: wwp, no edema  Skin: improved rash  Neuro: No focal deficits  Lines: PICC clean    TESTS & MEASUREMENTS:              Culture - Blood (collected 04-07-19 @ 18:51)  Source: .Blood None  Preliminary Report (04-08-19 @ 22:01):    No growth to date.    Culture - Fungal, Blood (collected 04-07-19 @ 18:51)  Source: .Blood Blood  Preliminary Report (04-08-19 @ 12:50):    Testing in progress    Culture - Blood (collected 04-04-19 @ 16:00)  Source: .Blood Blood  Preliminary Report (04-06-19 @ 01:01):    No growth to date.    Culture - Blood (collected 04-02-19 @ 20:00)  Source: .Blood Blood  Final Report (04-08-19 @ 02:04):    No growth at 5 days.          RADIOLOGY & ADDITIONAL TESTS:    ANTIBIOTICS:  cefepime   IVPB   100 mL/Hr IV Intermittent (03-25-19 @ 05:47)   100 mL/Hr IV Intermittent (03-24-19 @ 22:09)   100 mL/Hr IV Intermittent (03-24-19 @ 14:09)   100 mL/Hr IV Intermittent (03-24-19 @ 05:46)   100 mL/Hr IV Intermittent (03-23-19 @ 21:40)   100 mL/Hr IV Intermittent (03-23-19 @ 13:43)   100 mL/Hr IV Intermittent (03-23-19 @ 05:50)   100 mL/Hr IV Intermittent (03-23-19 @ 01:03)    DAPTOmycin IVPB   117.6 mL/Hr IV Intermittent (04-08-19 @ 15:44)   117.6 mL/Hr IV Intermittent (04-07-19 @ 13:56)   117.6 mL/Hr IV Intermittent (04-06-19 @ 15:17)   117.6 mL/Hr IV Intermittent (04-05-19 @ 13:51)   117.6 mL/Hr IV Intermittent (04-04-19 @ 14:24)   117.6 mL/Hr IV Intermittent (04-03-19 @ 14:46)    vancomycin  IVPB   166.67 mL/Hr IV Intermittent (03-30-19 @ 06:17)   166.67 mL/Hr IV Intermittent (03-29-19 @ 21:37)    vancomycin  IVPB   166.67 mL/Hr IV Intermittent (03-28-19 @ 06:03)   166.67 mL/Hr IV Intermittent (03-27-19 @ 21:30)   166.67 mL/Hr IV Intermittent (03-27-19 @ 14:15)   166.67 mL/Hr IV Intermittent (03-27-19 @ 05:12)   166.67 mL/Hr IV Intermittent (03-26-19 @ 21:53)   166.67 mL/Hr IV Intermittent (03-26-19 @ 16:59)    vancomycin  IVPB   250 mL/Hr IV Intermittent (03-26-19 @ 06:24)   250 mL/Hr IV Intermittent (03-25-19 @ 22:08)   250 mL/Hr IV Intermittent (03-25-19 @ 05:51)   250 mL/Hr IV Intermittent (03-24-19 @ 17:08)   250 mL/Hr IV Intermittent (03-24-19 @ 06:29)   250 mL/Hr IV Intermittent (03-23-19 @ 17:14)   250 mL/Hr IV Intermittent (03-23-19 @ 05:50)    vancomycin  IVPB   166.67 mL/Hr IV Intermittent (04-02-19 @ 22:43)        DAPTOmycin IVPB 440 milliGRAM(s) IV Intermittent every 24 hours

## 2019-04-09 NOTE — PROGRESS NOTE ADULT - SUBJECTIVE AND OBJECTIVE BOX
POD #18     S/P Wound Washout    Pt seen and examined at bedside. Pt c/o Left paraspinal pain. Denies radiculopathy.    Vital Signs Last 24 Hrs  T(C): 36.9 (09 Apr 2019 06:49), Max: 37.2 (08 Apr 2019 21:28)  T(F): 98.5 (09 Apr 2019 06:49), Max: 98.9 (08 Apr 2019 21:28)  HR: 82 (09 Apr 2019 06:49) (81 - 83)  BP: 145/76 (09 Apr 2019 06:49) (104/68 - 145/76)  BP(mean): --  RR: 18 (09 Apr 2019 06:49) (18 - 18)  SpO2: --    PHYSICAL EXAM:  Strength 5/5  Sensation intact to light touch  Incision intact    MEDICATIONS:  Antibiotics:  DAPTOmycin IVPB 440 milliGRAM(s) IV Intermittent every 24 hours    Neuro:  acetaminophen   Tablet .. 650 milliGRAM(s) Oral every 6 hours PRN  cyclobenzaprine 10 milliGRAM(s) Oral two times a day PRN  gabapentin 300 milliGRAM(s) Oral every 8 hours  ondansetron Injectable 4 milliGRAM(s) IV Push every 6 hours PRN  oxyCODONE    5 mG/acetaminophen 325 mG 1 Tablet(s) Oral every 4 hours PRN  oxyCODONE    5 mG/acetaminophen 325 mG 2 Tablet(s) Oral every 4 hours PRN    Anticoagulation:  heparin  Injectable 5000 Unit(s) SubCutaneous every 8 hours    OTHER:  BACItracin   Ointment 1 Application(s) Topical daily  bisacodyl Suppository 10 milliGRAM(s) Rectal daily PRN  docusate sodium 100 milliGRAM(s) Oral three times a day  labetalol 100 milliGRAM(s) Oral daily  lidocaine   Patch 1 Patch Transdermal every 24 hours  nystatin Powder 1 Application(s) Topical two times a day  pantoprazole    Tablet 40 milliGRAM(s) Oral before breakfast  polyethylene glycol 3350 17 Gram(s) Oral daily  senna 2 Tablet(s) Oral at bedtime    IVF:  cyanocobalamin 1000 MICROGram(s) Oral daily  multivitamin 1 Tablet(s) Oral daily  sodium chloride 0.9%. 1000 milliLiter(s) IV Continuous <Continuous>    Assessment:  As above    Plan:   for D/C Planning  Anticipate D/C home tomorrow

## 2019-04-10 ENCOUNTER — TRANSCRIPTION ENCOUNTER (OUTPATIENT)
Age: 49
End: 2019-04-10

## 2019-04-10 VITALS
TEMPERATURE: 98 F | DIASTOLIC BLOOD PRESSURE: 78 MMHG | SYSTOLIC BLOOD PRESSURE: 124 MMHG | RESPIRATION RATE: 18 BRPM | HEART RATE: 80 BPM

## 2019-04-10 PROBLEM — I10 ESSENTIAL (PRIMARY) HYPERTENSION: Chronic | Status: ACTIVE | Noted: 2019-03-22

## 2019-04-10 PROBLEM — M50.20 OTHER CERVICAL DISC DISPLACEMENT, UNSPECIFIED CERVICAL REGION: Chronic | Status: ACTIVE | Noted: 2019-03-22

## 2019-04-10 LAB
CULTURE RESULTS: SIGNIFICANT CHANGE UP
SPECIMEN SOURCE: SIGNIFICANT CHANGE UP

## 2019-04-10 RX ORDER — OXYCODONE AND ACETAMINOPHEN 5; 325 MG/1; MG/1
1 TABLET ORAL
Qty: 30 | Refills: 0
Start: 2019-04-10 | End: 2019-04-15

## 2019-04-10 RX ORDER — GABAPENTIN 400 MG/1
1 CAPSULE ORAL
Qty: 30 | Refills: 0
Start: 2019-04-10

## 2019-04-10 RX ORDER — BACITRACIN ZINC 500 UNIT/G
1 OINTMENT IN PACKET (EA) TOPICAL
Qty: 0 | Refills: 0 | DISCHARGE
Start: 2019-04-10

## 2019-04-10 RX ORDER — METHOCARBAMOL 500 MG/1
1 TABLET, FILM COATED ORAL
Qty: 30 | Refills: 0
Start: 2019-04-10 | End: 2019-04-19

## 2019-04-10 RX ORDER — DICLOFENAC SODIUM 30 MG/G
1 GEL TOPICAL
Qty: 30 | Refills: 0
Start: 2019-04-10

## 2019-04-10 RX ORDER — DAPTOMYCIN 500 MG/10ML
440 INJECTION, POWDER, LYOPHILIZED, FOR SOLUTION INTRAVENOUS
Qty: 0 | Refills: 0 | DISCHARGE
Start: 2019-04-10

## 2019-04-10 RX ADMIN — POLYETHYLENE GLYCOL 3350 17 GRAM(S): 17 POWDER, FOR SOLUTION ORAL at 12:05

## 2019-04-10 RX ADMIN — DAPTOMYCIN 117.6 MILLIGRAM(S): 500 INJECTION, POWDER, LYOPHILIZED, FOR SOLUTION INTRAVENOUS at 13:55

## 2019-04-10 RX ADMIN — DICLOFENAC SODIUM 2 GRAM(S): 30 GEL TOPICAL at 13:54

## 2019-04-10 RX ADMIN — Medication 100 MILLIGRAM(S): at 05:47

## 2019-04-10 RX ADMIN — GABAPENTIN 300 MILLIGRAM(S): 400 CAPSULE ORAL at 13:54

## 2019-04-10 RX ADMIN — GABAPENTIN 300 MILLIGRAM(S): 400 CAPSULE ORAL at 05:47

## 2019-04-10 RX ADMIN — DICLOFENAC SODIUM 2 GRAM(S): 30 GEL TOPICAL at 05:47

## 2019-04-10 RX ADMIN — HEPARIN SODIUM 5000 UNIT(S): 5000 INJECTION INTRAVENOUS; SUBCUTANEOUS at 13:55

## 2019-04-10 RX ADMIN — OXYCODONE AND ACETAMINOPHEN 2 TABLET(S): 5; 325 TABLET ORAL at 14:01

## 2019-04-10 RX ADMIN — PREGABALIN 1000 MICROGRAM(S): 225 CAPSULE ORAL at 12:05

## 2019-04-10 RX ADMIN — Medication 1 TABLET(S): at 12:05

## 2019-04-10 RX ADMIN — HEPARIN SODIUM 5000 UNIT(S): 5000 INJECTION INTRAVENOUS; SUBCUTANEOUS at 05:47

## 2019-04-10 RX ADMIN — Medication 1 APPLICATION(S): at 12:03

## 2019-04-10 RX ADMIN — NYSTATIN CREAM 1 APPLICATION(S): 100000 CREAM TOPICAL at 05:47

## 2019-04-10 RX ADMIN — PANTOPRAZOLE SODIUM 40 MILLIGRAM(S): 20 TABLET, DELAYED RELEASE ORAL at 05:47

## 2019-04-10 NOTE — PROGRESS NOTE ADULT - SUBJECTIVE AND OBJECTIVE BOX
POD #19     S/P Wound Washout    Pt seen and examined at bedside. Pt c/o left sided back pain.    Vital Signs Last 24 Hrs  T(C): 36.1 (10 Apr 2019 05:00), Max: 36.2 (09 Apr 2019 22:36)  T(F): 97 (10 Apr 2019 05:00), Max: 97.1 (09 Apr 2019 22:36)  HR: 76 (10 Apr 2019 05:00) (76 - 87)  BP: 145/78 (10 Apr 2019 05:00) (116/62 - 145/78)  BP(mean): --  RR: 18 (10 Apr 2019 05:00) (18 - 18)  SpO2: --    PHYSICAL EXAM:  Strength 5/5  + sensation to light touch  Incision intact    MEDICATIONS:  Antibiotics:  DAPTOmycin IVPB 440 milliGRAM(s) IV Intermittent every 24 hours    Neuro:  acetaminophen   Tablet .. 650 milliGRAM(s) Oral every 6 hours PRN  cyclobenzaprine 10 milliGRAM(s) Oral two times a day PRN  gabapentin 300 milliGRAM(s) Oral every 8 hours  ondansetron Injectable 4 milliGRAM(s) IV Push every 6 hours PRN  oxyCODONE    5 mG/acetaminophen 325 mG 1 Tablet(s) Oral every 4 hours PRN  oxyCODONE    5 mG/acetaminophen 325 mG 2 Tablet(s) Oral every 4 hours PRN    Anticoagulation:  heparin  Injectable 5000 Unit(s) SubCutaneous every 8 hours    OTHER:  BACItracin   Ointment 1 Application(s) Topical daily  bisacodyl Suppository 10 milliGRAM(s) Rectal daily PRN  diclofenac sodium 1% Gel 2 Gram(s) Topical every 8 hours  docusate sodium 100 milliGRAM(s) Oral three times a day  labetalol 100 milliGRAM(s) Oral daily  lidocaine   Patch 1 Patch Transdermal every 24 hours  nystatin Powder 1 Application(s) Topical two times a day  pantoprazole    Tablet 40 milliGRAM(s) Oral before breakfast  polyethylene glycol 3350 17 Gram(s) Oral daily  senna 2 Tablet(s) Oral at bedtime    IVF:  cyanocobalamin 1000 MICROGram(s) Oral daily  multivitamin 1 Tablet(s) Oral daily  sodium chloride 0.9%. 1000 milliLiter(s) IV Continuous <Continuous>    Assessment:  As above    Plan:  D/C Home today

## 2019-04-10 NOTE — DISCHARGE NOTE PROVIDER - NSDCCPCAREPLAN_GEN_ALL_CORE_FT
PRINCIPAL DISCHARGE DIAGNOSIS  Diagnosis: Postoperative infection, unspecified type, initial encounter  Assessment and Plan of Treatment:

## 2019-04-10 NOTE — DISCHARGE NOTE PROVIDER - NSDCACTIVITY_GEN_ALL_CORE
No heavy lifting/straining/Do not make important decisions/Do not drive or operate machinery/Showering allowed

## 2019-04-10 NOTE — DISCHARGE NOTE PROVIDER - CARE PROVIDER_API CALL
Susan Christensen)  Neurological Surgery  [address], [address]  [place] [address]  Phone: [phone]  Fax: [phone]  Follow Up Time:

## 2019-04-10 NOTE — PROGRESS NOTE ADULT - PROVIDER SPECIALTY LIST ADULT
Burn
Infectious Disease
Nephrology
Nephrology
Neurosurgery
Infectious Disease

## 2019-04-10 NOTE — PROGRESS NOTE ADULT - REASON FOR ADMISSION
wound infection

## 2019-04-12 LAB
CULTURE RESULTS: SIGNIFICANT CHANGE UP
SPECIMEN SOURCE: SIGNIFICANT CHANGE UP

## 2019-04-16 ENCOUNTER — APPOINTMENT (OUTPATIENT)
Dept: NEUROSURGERY | Facility: CLINIC | Age: 49
End: 2019-04-16
Payer: COMMERCIAL

## 2019-04-16 PROCEDURE — 99024 POSTOP FOLLOW-UP VISIT: CPT

## 2019-04-17 DIAGNOSIS — N14.1 NEPHROPATHY INDUCED BY OTHER DRUGS, MEDICAMENTS AND BIOLOGICAL SUBSTANCES: ICD-10-CM

## 2019-04-17 DIAGNOSIS — I10 ESSENTIAL (PRIMARY) HYPERTENSION: ICD-10-CM

## 2019-04-17 DIAGNOSIS — T81.41XA INFECTION FOLLOWING A PROCEDURE, SUPERFICIAL INCISIONAL SURGICAL SITE, INITIAL ENCOUNTER: ICD-10-CM

## 2019-04-17 DIAGNOSIS — N17.9 ACUTE KIDNEY FAILURE, UNSPECIFIED: ICD-10-CM

## 2019-04-17 DIAGNOSIS — T36.8X5A ADVERSE EFFECT OF OTHER SYSTEMIC ANTIBIOTICS, INITIAL ENCOUNTER: ICD-10-CM
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
## 2019-04-17 DIAGNOSIS — Z79.899 OTHER LONG TERM (CURRENT) DRUG THERAPY: ICD-10-CM

## 2019-04-17 DIAGNOSIS — L02.212 CUTANEOUS ABSCESS OF BACK [ANY PART, EXCEPT BUTTOCK]: ICD-10-CM

## 2019-04-17 DIAGNOSIS — Z79.891 LONG TERM (CURRENT) USE OF OPIATE ANALGESIC: ICD-10-CM

## 2019-04-22 NOTE — HISTORY OF PRESENT ILLNESS
[FreeTextEntry1] : Pt is here s/p wound washout for lumbar infection after revision discectomy. Pt is improving. no further rash (vanco hypersensitivity reaction). On IV abx per ID. Pt reports improvement in low back pain/muscular spasm. Per  and sister pt is increasingly more able to manage own ADLs. Currently undergoing home PT. Using percocet minimally for pain. \par

## 2019-04-22 NOTE — PHYSICAL EXAM
[FreeTextEntry1] : Constitutional: Well appearing, somewhat tearful\par HEENT: Normocephalic Atraumatic\par Psychiatric: Alert and oriented to all spheres, normal mood\par Pulmonary: no respiratory distress\par Abdomen: non-distended\par Vascular/Extremities: no edema, no cyanosis, no clubbing\par \par \par Neurologic: \par CN II-XII grossly intact\par ROM: decreased in lumbar spine\par Palpation: muscular spasm palpable in lumbar paraspinous muscles\par Strength: Full strength in all major muscle groups, no atrophy\par Sensation: Full sensation to light touch in all extremities\par Reflexes: \par               2+ patellar\par               2+ biceps\par               2+ ankle jerk\par              No Soto's\par              No clonus\par              No babinski\par \par Signs:\par SLR negative\par L'hermitte's negative\par \par Gait: antaglic, using walker for comfort only\par \par \par \par Incision well healed, no signs of infection\par

## 2019-04-23 ENCOUNTER — APPOINTMENT (OUTPATIENT)
Dept: NEUROSURGERY | Facility: CLINIC | Age: 49
End: 2019-04-23
Payer: COMMERCIAL

## 2019-04-23 PROCEDURE — 99024 POSTOP FOLLOW-UP VISIT: CPT

## 2019-04-24 NOTE — HISTORY OF PRESENT ILLNESS
[FreeTextEntry1] : s/p L4-S1 microdiscectomy complicated by postop infection, complicated by severe hypersensitivity reaction to vancomycin requiring prolonged hospitalization.  She is now improving.  She has no radicular symptoms.  She reports low back pain but it is improving.  She uses a walker to ambulate due to a fear of falling but feels she is able walk unassisted.  Daptomycin until 5/6.\par \par Recommend MRI lumbar spine +/- mid May\par Will need intense PT post MRI\par may need injections for pain control after infection cleared.

## 2019-04-30 ENCOUNTER — APPOINTMENT (OUTPATIENT)
Dept: NEUROSURGERY | Facility: CLINIC | Age: 49
End: 2019-04-30
Payer: COMMERCIAL

## 2019-04-30 VITALS — BODY MASS INDEX: 27.46 KG/M2 | WEIGHT: 155 LBS | HEIGHT: 63 IN

## 2019-04-30 PROCEDURE — 99024 POSTOP FOLLOW-UP VISIT: CPT

## 2019-04-30 RX ORDER — CLINDAMYCIN HYDROCHLORIDE 300 MG/1
300 CAPSULE ORAL EVERY 6 HOURS
Qty: 40 | Refills: 0 | Status: DISCONTINUED | COMMUNITY
Start: 2019-03-19 | End: 2019-04-30

## 2019-04-30 RX ORDER — FAMOTIDINE 20 MG/1
20 TABLET, FILM COATED ORAL
Qty: 6 | Refills: 0 | Status: DISCONTINUED | COMMUNITY
Start: 2018-12-11 | End: 2019-04-30

## 2019-04-30 RX ORDER — METHYLPREDNISOLONE 4 MG/1
4 TABLET ORAL
Qty: 1 | Refills: 0 | Status: DISCONTINUED | COMMUNITY
Start: 2018-12-11 | End: 2019-04-30

## 2019-04-30 RX ORDER — DOCUSATE SODIUM 100 MG/1
100 CAPSULE ORAL EVERY 8 HOURS
Qty: 90 | Refills: 0 | Status: DISCONTINUED | COMMUNITY
Start: 2019-02-04 | End: 2019-04-30

## 2019-04-30 RX ORDER — ACETAMINOPHEN 500 MG/1
TABLET ORAL
Refills: 0 | Status: DISCONTINUED | COMMUNITY
End: 2019-04-30

## 2019-04-30 RX ORDER — CHLORZOXAZONE 500 MG/1
500 TABLET ORAL EVERY 8 HOURS
Qty: 15 | Refills: 0 | Status: DISCONTINUED | COMMUNITY
Start: 2019-02-04 | End: 2019-04-30

## 2019-05-03 NOTE — HISTORY OF PRESENT ILLNESS
[FreeTextEntry1] : Patient presents today s/p left L4-S1 microdiscectomy done on 2/28/19. Patient complaining of pain on her left side of her low back,which she states is getting better compare to her last visit. Patient states she no longer has numbness on her left foot. Surgical site is healed. Patient is participating in home physical therapy, and she is doing well. Patient continues to receive with antibiotics, will follow up with ID.

## 2019-05-03 NOTE — PHYSICAL EXAM
[FreeTextEntry1] : Pt ambulating smoothly with walker. Minimal reliance on walker. Incision well healed. 5/5 BLE.

## 2019-05-07 ENCOUNTER — APPOINTMENT (OUTPATIENT)
Dept: NEUROSURGERY | Facility: CLINIC | Age: 49
End: 2019-05-07
Payer: COMMERCIAL

## 2019-05-07 VITALS
BODY MASS INDEX: 27.46 KG/M2 | RESPIRATION RATE: 16 BRPM | WEIGHT: 155 LBS | DIASTOLIC BLOOD PRESSURE: 83 MMHG | OXYGEN SATURATION: 96 % | HEIGHT: 63 IN | SYSTOLIC BLOOD PRESSURE: 129 MMHG | HEART RATE: 83 BPM

## 2019-05-07 PROCEDURE — 99024 POSTOP FOLLOW-UP VISIT: CPT

## 2019-05-08 LAB
CULTURE RESULTS: SIGNIFICANT CHANGE UP
SPECIMEN SOURCE: SIGNIFICANT CHANGE UP

## 2019-05-08 NOTE — HISTORY OF PRESENT ILLNESS
[FreeTextEntry1] : DOS: 2-05-19; 2-28-19;3-22-19\par \par Patient presents today  s/p left L4-S1 microdiscectomy done on 2/28/19. Patient complaining of pain

## 2019-05-14 ENCOUNTER — APPOINTMENT (OUTPATIENT)
Dept: NEUROSURGERY | Facility: CLINIC | Age: 49
End: 2019-05-14
Payer: COMMERCIAL

## 2019-05-14 PROCEDURE — 99024 POSTOP FOLLOW-UP VISIT: CPT

## 2019-05-15 NOTE — HISTORY OF PRESENT ILLNESS
[FreeTextEntry1] : s/p microdiscectomy, wound washout for infection.  She is significantly improved.  Mild back pain.  No radicular pain.  \par \par MRI - treated L4-5 osteomyelitis, discitis\par \par PT\par \par Return in 4 weeks.

## 2019-05-21 ENCOUNTER — APPOINTMENT (OUTPATIENT)
Dept: NEUROSURGERY | Facility: CLINIC | Age: 49
End: 2019-05-21

## 2019-06-12 ENCOUNTER — APPOINTMENT (OUTPATIENT)
Dept: NEUROSURGERY | Facility: CLINIC | Age: 49
End: 2019-06-12
Payer: COMMERCIAL

## 2019-06-12 DIAGNOSIS — M51.26 OTHER INTERVERTEBRAL DISC DISPLACEMENT, LUMBAR REGION: ICD-10-CM

## 2019-06-12 PROCEDURE — 99024 POSTOP FOLLOW-UP VISIT: CPT

## 2019-06-13 PROBLEM — M51.26 LUMBAR DISC HERNIATION: Status: ACTIVE | Noted: 2018-12-11

## 2019-06-13 NOTE — HISTORY OF PRESENT ILLNESS
[FreeTextEntry1] : s/p 2 microdiscectomies complicated by an infection requiring a washout and IV antibiotics.  Ms. Escudero is currently doing very well.  She is completely healed.  Wound has healed.  She is working with PT on getting her strength back.  She will return in 6 weeks.

## 2019-11-05 ENCOUNTER — APPOINTMENT (OUTPATIENT)
Dept: NEUROSURGERY | Facility: CLINIC | Age: 49
End: 2019-11-05
Payer: COMMERCIAL

## 2019-11-05 VITALS — HEIGHT: 63 IN | BODY MASS INDEX: 27.46 KG/M2 | RESPIRATION RATE: 16 BRPM | WEIGHT: 155 LBS

## 2019-11-05 DIAGNOSIS — M54.16 RADICULOPATHY, LUMBAR REGION: ICD-10-CM

## 2019-11-05 PROCEDURE — 99212 OFFICE O/P EST SF 10 MIN: CPT

## 2019-11-05 RX ORDER — DICLOFENAC SODIUM 10 MG/G
1 GEL TOPICAL
Qty: 100 | Refills: 0 | Status: DISCONTINUED | COMMUNITY
Start: 2019-04-10 | End: 2019-11-05

## 2019-11-05 RX ORDER — METHOCARBAMOL 500 MG/1
500 TABLET, FILM COATED ORAL
Qty: 40 | Refills: 0 | Status: DISCONTINUED | COMMUNITY
Start: 2019-03-01 | End: 2019-11-05

## 2019-11-05 RX ORDER — OXYCODONE AND ACETAMINOPHEN 5; 325 MG/1; MG/1
5-325 TABLET ORAL
Qty: 60 | Refills: 0 | Status: DISCONTINUED | COMMUNITY
Start: 2019-02-04 | End: 2019-11-05

## 2019-11-05 RX ORDER — GABAPENTIN 300 MG/1
300 CAPSULE ORAL TWICE DAILY
Qty: 60 | Refills: 5 | Status: DISCONTINUED | COMMUNITY
Start: 2018-12-11 | End: 2019-11-05

## 2019-11-05 NOTE — HISTORY OF PRESENT ILLNESS
[FreeTextEntry1] : CC:  low back pain\par \par HPI:  s/p L4-5 L5-S1 discectomy complicated by infection with prolonged hospitalization.  Doing well.  Has occasional back pain and pulling in the left calf.  She is back at work full time doing well.  Wound healed.  She has had extensive PT and is requesting more sessions.\par \par Neuro intact,\par \par I agree with more PT as she has had a significant hospital course due to infection and was deconditioned. \par She will return in 6 months for follow-up.

## 2022-07-22 NOTE — PATIENT PROFILE ADULT - PRIMARY ROLES/RESPONSIBILITIES
[FreeTextEntry1] : 41 y/o F pt with:\par \par 1. Gestational transient thyrotoxicosis:\par Patient is 22 weeks pregnant. She is clinically euthyroid at present. \par No family history of autoimmune disorders. \par Send TFTs and TrAb. \par Will contact pt with results. \par \par Return in 1 year\par \par  parent

## 2022-09-21 NOTE — PROGRESS NOTE ADULT - SUBJECTIVE AND OBJECTIVE BOX
Patient complains about pain in her feet that radiates to her b/l thighs after walking long distances  Has been present for apx 2 year    Pain alleviated by rest  Patient with no signs of varicose veins or neuropathy on physical exam     -Educated patient on stretching exercises  -May consider referral arterial doppler study on next visit if no improvement of pain to r/o PAD  -Ordered  CMP  -F/U with results Subjective: 48yFemale with a pmhx of POSTOPERATIVE INFECTION  ^S/P SURGERY PAIN/INFECTION  Handoff  MEWS Score  Herniated disc, cervical  Hypertension  No pertinent past medical history  Postoperative infection, unspecified type, initial encounter  Postoperative infection, unspecified type, initial encounter  Incision and drainage, wound, lumbar  Complex incision and drainage of wound infection  H/O lumbar discectomy  S/P SURGERY PAIN/INFECTION  20      POD # 5    S/P Lumbar Wound Washout    Pt seen and examined at bedside w Dr Christensen.  Pt c/o incisional pain at this time.  Complaining of pain to the left lower back and hip.     Allergies    No Known Allergies    Intolerances        Vital Signs Last 24 Hrs  T(C): 36.2 (27 Mar 2019 06:08), Max: 37.1 (26 Mar 2019 22:45)  T(F): 97.1 (27 Mar 2019 06:08), Max: 98.7 (26 Mar 2019 22:45)  HR: 82 (27 Mar 2019 06:08) (80 - 91)  BP: 121/65 (27 Mar 2019 06:08) (121/65 - 146/70)  BP(mean): --  RR: 20 (27 Mar 2019 06:08) (18 - 20)  SpO2: --      acetaminophen   Tablet .. 650 milliGRAM(s) Oral every 6 hours PRN  cyanocobalamin 1000 MICROGram(s) Oral daily  diazepam    Tablet 5 milliGRAM(s) Oral every 8 hours  docusate sodium 100 milliGRAM(s) Oral three times a day  heparin  Injectable 5000 Unit(s) SubCutaneous every 8 hours  labetalol 100 milliGRAM(s) Oral daily  morphine  - Injectable 2 milliGRAM(s) IV Push every 4 hours PRN  multivitamin 1 Tablet(s) Oral daily  ondansetron Injectable 4 milliGRAM(s) IV Push every 6 hours PRN  oxyCODONE    5 mG/acetaminophen 325 mG 2 Tablet(s) Oral every 4 hours PRN  oxyCODONE    5 mG/acetaminophen 325 mG 1 Tablet(s) Oral every 4 hours PRN  pantoprazole    Tablet 40 milliGRAM(s) Oral before breakfast  senna 2 Tablet(s) Oral at bedtime  vancomycin  IVPB 1250 milliGRAM(s) IV Intermittent every 8 hours        03-26-19 @ 07:01  -  03-27-19 @ 07:00  --------------------------------------------------------  IN: 0 mL / OUT: 2 mL / NET: -2 mL          Exam:  AAOX3. Verbal function intact  follows commands  Motor: MAEx4   5/5 power in b/l  LE  Sensation intact to fine touch        CBC Full  -  ( 27 Mar 2019 06:32 )  WBC Count : 6.97 K/uL  RBC Count : 3.71 M/uL  Hemoglobin : 11.0 g/dL  Hematocrit : 33.1 %  Platelet Count - Automated : 271 K/uL  Mean Cell Volume : 89.2 fL  Mean Cell Hemoglobin : 29.6 pg  Mean Cell Hemoglobin Concentration : 33.2 g/dL  Auto Neutrophil # : x  Auto Lymphocyte # : x  Auto Monocyte # : x  Auto Eosinophil # : x  Auto Basophil # : x  Auto Neutrophil % : x  Auto Lymphocyte % : x  Auto Monocyte % : x  Auto Eosinophil % : x  Auto Basophil % : x    03-27    141  |  103  |  10  ----------------------------<  137<H>  3.3<L>   |  25  |  0.6<L>    Ca    8.6      27 Mar 2019 06:32  Phos  3.6     03-27  Mg     1.6     03-27      PT/INR - ( 27 Mar 2019 06:32 )   PT: 12.80 sec;   INR: 1.11 ratio         PTT - ( 27 Mar 2019 06:32 )  PTT:35.8 sec        Wound: wound clean and intact, no oozing  dressing applied    Imaging:< from: MR Hip w/ IV Cont, Left (03.26.19 @ 18:58) >  Impression:    No evidence of osteomyelitis or MRI evidence of left hip septic arthritis.    High-grade insertional partial-thickness tear of the anterior half of the   gluteus medius.    Fibroid uterus, partially imaged right adnexal 1.9 cm cyst.    2.5 cm right Bartholin gland cyst.    Unchanged left paravertebral muscle, level of the sacrum, partially   imaged, unchanged from the prior MRI of the lumbar spine.    < end of copied text >      Assessment/Plan: as above  pain control  OOB to ambulate   PT/rehab  daily blood cxs  f/u Vanco trough  daily Vanco troughs  discussed w attg

## 2022-12-20 NOTE — ED ADULT TRIAGE NOTE - SOURCE OF INFORMATION
Patient Complex Repair And A-T Advancement Flap Text: The defect edges were debeveled with a #15 scalpel blade.  The primary defect was closed partially with a complex linear closure.  Given the location of the remaining defect, shape of the defect and the proximity to free margins an A-T advancement flap was deemed most appropriate for complete closure of the defect.  Using a sterile surgical marker, an appropriate advancement flap was drawn incorporating the defect and placing the expected incisions within the relaxed skin tension lines where possible.    The area thus outlined was incised deep to adipose tissue with a #15 scalpel blade.  The skin margins were undermined to an appropriate distance in all directions utilizing iris scissors.

## 2023-03-27 ENCOUNTER — APPOINTMENT (OUTPATIENT)
Dept: UROLOGY | Facility: CLINIC | Age: 53
End: 2023-03-27
Payer: COMMERCIAL

## 2023-03-27 VITALS
HEART RATE: 84 BPM | BODY MASS INDEX: 31 KG/M2 | SYSTOLIC BLOOD PRESSURE: 143 MMHG | DIASTOLIC BLOOD PRESSURE: 95 MMHG | OXYGEN SATURATION: 98 % | TEMPERATURE: 97.6 F | WEIGHT: 175 LBS

## 2023-03-27 DIAGNOSIS — R30.0 DYSURIA: ICD-10-CM

## 2023-03-27 DIAGNOSIS — N20.1 CALCULUS OF URETER: ICD-10-CM

## 2023-03-27 DIAGNOSIS — Z83.3 FAMILY HISTORY OF DIABETES MELLITUS: ICD-10-CM

## 2023-03-27 PROCEDURE — 81003 URINALYSIS AUTO W/O SCOPE: CPT | Mod: QW

## 2023-03-27 PROCEDURE — 99204 OFFICE O/P NEW MOD 45 MIN: CPT

## 2023-03-27 RX ORDER — ERGOCALCIFEROL 1.25 MG/1
1.25 MG CAPSULE, LIQUID FILLED ORAL
Qty: 4 | Refills: 0 | Status: COMPLETED | COMMUNITY
Start: 2019-02-01 | End: 2023-03-27

## 2023-03-27 RX ORDER — LABETALOL HYDROCHLORIDE 300 MG/1
TABLET, FILM COATED ORAL
Refills: 0 | Status: COMPLETED | COMMUNITY
End: 2023-03-27

## 2023-03-27 NOTE — HISTORY OF PRESENT ILLNESS
[FreeTextEntry1] : 52 yr old male presents for ED followup for kidney stone. She started experiencing lower abdominal pain in Feb while in [place], and attributed it to food poisoning. Pain resolved. Then returned last week associated with fever, chills, nausea. She went to [facility]ED on 3/23/23 and CT abdomen/pelvis showed a left 3 mm UVJ stone with mild hydronephrosis, perinephric fat stranding, delayed nephrogram. Additional left lower pole 10 mm stone and 2-3 mm right renal stone. Has not passed stone that she has seen. \par \par Currently she has occasional lower abdominal pain/pressure, dysuria. Denies current fever or chills. She is on Levaquin which is complete today. \par \par Of note, breast biopsy done today. \par \par CT abd w/ IV contrast 3/23/23\par 2 x 3 mm left ureteral vesicle junction stone and mild urinary obstructive findings including hydronephrosis, perinephric fat stranding, and delayed nephrogram\par additional punctate bilateral stones 2- 3 mm right renal stones \par left lower pole stellate stone up to 10 mm. \par \par UA: trace ketones, trace blood, protein 30, trace WBCs \par \par

## 2023-03-27 NOTE — PHYSICAL EXAM
[General Appearance - Well Developed] : well developed [General Appearance - Well Nourished] : well nourished [Normal Appearance] : normal appearance [Well Groomed] : well groomed [General Appearance - In No Acute Distress] : no acute distress [Edema] : no peripheral edema [Respiration, Rhythm And Depth] : normal respiratory rhythm and effort [Exaggerated Use Of Accessory Muscles For Inspiration] : no accessory muscle use [Costovertebral Angle Tenderness] : no ~M costovertebral angle tenderness [Normal Station and Gait] : the gait and station were normal for the patient's age [] : no rash [No Focal Deficits] : no focal deficits [Oriented To Time, Place, And Person] : oriented to person, place, and time [Affect] : the affect was normal [Mood] : the mood was normal [Not Anxious] : not anxious [Abdomen Soft] : soft [Abdomen Tenderness] : non-tender

## 2023-03-27 NOTE — ASSESSMENT
[FreeTextEntry1] : 52 yr old female with PMH htn presents with left ureteral stone. \par \par The patient was seen and examined and results were reviewed. \par \par Kidney stone disease was reviewed with the patient and etiologies/risk factors were discussed. Preventative measures were discussed including hydration, diet modification, and medications.Options for management were reviewed including conservative management (no intervention), medical expulsive therapy, and surgical management. Merits and limitations of each option was discussed. \par \par The possibility of spontaneous stone passage based on stone size and location was reviewed. Risks of spontaneous passage including pain, hematuria, fever, chills, nausea/emesis, infection, urosepsis, dehydration, ureteral or renal injury, need for repeat office or emergency room visits, and permanent loss of renal function were reviewed.\par \par Surgical options were presented including ureteral stent placement, extracorporeal shockwave lithotripsy (ESWL),  and ureteroscopy with laser lithotripsy.  The procedures were discussed in depth. Success rates, complications, and potential for subsequent procedures was discussed for each option.The role of imaging studies including ultrasound, plain film x-rays, and CT scan were discussed, as well as potential radiation exposure risks. Metabolic evaluation with serum chemistry and 24 hour urine collections were presented for the purpose of determining the etiology of the patient's stone forming risk factors.\par \par Patient would like to try MET with tamsulosin before proceeding with surgical intervention. She will strain urine in and follow up in 2 weeks, if she has not passed stone by then, we will scheduled L URS. She knows that if she develops any fever, chills, intractable pain, nausea/vomiting she should report to ED right away. \par \par \par Plan:\par 1. UCx\par 2. MET with tamsulosin, ibuprofen PRN\par 3. If any fever or chills she will report to ED\par 4. RTC 2 weeks,. no symptoms and no passed stone then will repeat CT scan

## 2023-03-28 LAB
BILIRUB UR QL STRIP: NORMAL
CLARITY UR: CLEAR
COLLECTION METHOD: NORMAL
GLUCOSE UR-MCNC: NORMAL
HCG UR QL: 0.2 EU/DL
HGB UR QL STRIP.AUTO: NORMAL
KETONES UR-MCNC: NORMAL
LEUKOCYTE ESTERASE UR QL STRIP: NORMAL
NITRITE UR QL STRIP: NORMAL
PH UR STRIP: 7
PROT UR STRIP-MCNC: NORMAL
SP GR UR STRIP: >1.03

## 2023-03-29 ENCOUNTER — NON-APPOINTMENT (OUTPATIENT)
Age: 53
End: 2023-03-29

## 2023-03-29 LAB — BACTERIA UR CULT: NORMAL

## 2023-04-21 ENCOUNTER — NON-APPOINTMENT (OUTPATIENT)
Age: 53
End: 2023-04-21

## 2023-04-26 ENCOUNTER — OUTPATIENT (OUTPATIENT)
Dept: OUTPATIENT SERVICES | Facility: HOSPITAL | Age: 53
LOS: 1 days | End: 2023-04-26

## 2023-04-26 ENCOUNTER — APPOINTMENT (OUTPATIENT)
Dept: CT IMAGING | Facility: CLINIC | Age: 53
End: 2023-04-26
Payer: COMMERCIAL

## 2023-04-26 DIAGNOSIS — Z98.890 OTHER SPECIFIED POSTPROCEDURAL STATES: Chronic | ICD-10-CM

## 2023-04-26 PROCEDURE — 74176 CT ABD & PELVIS W/O CONTRAST: CPT | Mod: 26

## 2023-04-26 NOTE — PHYSICAL THERAPY INITIAL EVALUATION ADULT - PHYSICAL ASSIST/NONPHYSICAL ASSIST: STAND/SIT, REHAB EVAL
verbal cues/2 person assist Aklief Pregnancy And Lactation Text: It is unknown if this medication is safe to use during pregnancy.  It is unknown if this medication is excreted in breast milk.  Breastfeeding women should use the topical cream on the smallest area of the skin for the shortest time needed while breastfeeding.  Do not apply to nipple and areola.

## 2023-05-05 ENCOUNTER — APPOINTMENT (OUTPATIENT)
Dept: UROLOGY | Facility: CLINIC | Age: 53
End: 2023-05-05
Payer: COMMERCIAL

## 2023-05-05 ENCOUNTER — LABORATORY RESULT (OUTPATIENT)
Age: 53
End: 2023-05-05

## 2023-05-05 VITALS
HEART RATE: 82 BPM | DIASTOLIC BLOOD PRESSURE: 83 MMHG | OXYGEN SATURATION: 97 % | TEMPERATURE: 98 F | SYSTOLIC BLOOD PRESSURE: 142 MMHG

## 2023-05-05 DIAGNOSIS — D17.9 BENIGN LIPOMATOUS NEOPLASM, UNSPECIFIED: ICD-10-CM

## 2023-05-05 PROCEDURE — 99214 OFFICE O/P EST MOD 30 MIN: CPT

## 2023-05-05 NOTE — ASSESSMENT
[FreeTextEntry1] : 53 yo female with L > R nephrolithiasis.  The largest stone on the left is 1.2 cm.  We discussed treatment options.\par \par Surgical options were presented including ureteral stent placement, extracorporeal shockwave lithotripsy (ESWL), ureteroscopy with laser lithotripsy, and percutaneous nephrolithotomy. The procedures were discussed in depth. Success rates, complications, and potential for subsequent procedures was discussed for each option.The role of imaging studies including ultrasound, plain film x-rays, and CT scan were discussed, as well as potential radiation exposure risks. Metabolic evaluation with serum chemistry and 24 hour urine collections were presented for the purpose of determining the etiology of the patient's stone forming risk factors.\par \par She wishes to proceed with left URS.  We discussed the need for s stent post operatively.  We discussed the 5-10% risk that we can not gain access to the kidney which would require placement of a stent at first followed 2 weeks later with definitive treatment. \par \par Lastly, we discussed the incidental finding of a small, 6 mm left lower pole AML.  I explained that these are benign tumors that typically do not require intervention, as especially at this small size.  We will monitor this periodically with US. \par \par Plan:\par 1. Schedule left URS\par 2. PST\par 3. medical clearance\par 4. Stone analysis

## 2023-05-05 NOTE — HISTORY OF PRESENT ILLNESS
[FreeTextEntry1] : 3/27/23:\par 52 yr old male presents for ED followup for kidney stone. She started experiencing lower abdominal pain in Feb while in [place], and attributed it to food poisoning. Pain resolved. Then returned last week associated with fever, chills, nausea. She went to [facility]-ED on 3/23/23 and CT abdomen/pelvis showed a left 3 mm UVJ stone with mild hydronephrosis, perinephric fat stranding, delayed nephrogram. Additional left lower pole 10 mm stone and 2-3 mm right renal stone. Has not passed stone that she has seen. \par \par Currently she has occasional lower abdominal pain/pressure, dysuria. Denies current fever or chills. She is on Levaquin which is complete today. \par \par Of note, breast biopsy done today. \par \par CT abd w/ IV contrast 3/23/23\par 2 x 3 mm left ureteral vesicle junction stone and mild urinary obstructive findings including hydronephrosis, perinephric fat stranding, and delayed nephrogram\par additional punctate bilateral stones 2- 3 mm right renal stones \par left lower pole stellate stone up to 10 mm. \par \par UA: trace ketones, trace blood, protein 30, trace WBCs \par \par **************\par 5/5/23:\par Patient returns for follow up  CT scan shows multiple B/L nonobstructing stones, largest of which is 4 mm in the right lower pole and 1.2 cm in the left mid to lower pole.  The previously noted ureteral stone is no longer present.  She brought in the stone today  There is no hydronephrosis bilaterally.  In addition, a 6 mm AML is identified in the lower pole of the left kidney.  \par

## 2023-05-05 NOTE — PHYSICAL EXAM
[General Appearance - Well Developed] : well developed [Normal Appearance] : normal appearance [Abdomen Soft] : soft [Costovertebral Angle Tenderness] : no ~M costovertebral angle tenderness [Skin Color & Pigmentation] : normal skin color and pigmentation [Heart Rate And Rhythm] : Heart rate and rhythm were normal [] : no respiratory distress [Oriented To Time, Place, And Person] : oriented to person, place, and time [Normal Station and Gait] : the gait and station were normal for the patient's age [No Focal Deficits] : no focal deficits

## 2023-05-08 LAB
ANION GAP SERPL CALC-SCNC: 11 MMOL/L
APTT BLD: 29.5 SEC
BASOPHILS # BLD AUTO: 0.03 K/UL
BASOPHILS NFR BLD AUTO: 0.5 %
BUN SERPL-MCNC: 17 MG/DL
CALCIUM SERPL-MCNC: 8.8 MG/DL
CHLORIDE SERPL-SCNC: 105 MMOL/L
CO2 SERPL-SCNC: 24 MMOL/L
CREAT SERPL-MCNC: 0.7 MG/DL
EGFR: 103 ML/MIN/1.73M2
EOSINOPHIL # BLD AUTO: 0.18 K/UL
EOSINOPHIL NFR BLD AUTO: 2.7 %
GLUCOSE SERPL-MCNC: 101 MG/DL
HCT VFR BLD CALC: 43.7 %
HGB BLD-MCNC: 13.6 G/DL
IMM GRANULOCYTES NFR BLD AUTO: 0.3 %
INR PPP: 0.96 RATIO
LYMPHOCYTES # BLD AUTO: 2.44 K/UL
LYMPHOCYTES NFR BLD AUTO: 36.7 %
MAN DIFF?: NORMAL
MCHC RBC-ENTMCNC: 28.4 PG
MCHC RBC-ENTMCNC: 31.1 GM/DL
MCV RBC AUTO: 91.2 FL
MONOCYTES # BLD AUTO: 0.46 K/UL
MONOCYTES NFR BLD AUTO: 6.9 %
NEUTROPHILS # BLD AUTO: 3.51 K/UL
NEUTROPHILS NFR BLD AUTO: 52.9 %
PLATELET # BLD AUTO: 281 K/UL
POTASSIUM SERPL-SCNC: 5.1 MMOL/L
PT BLD: 11.1 SEC
RBC # BLD: 4.79 M/UL
RBC # FLD: 14.6 %
SODIUM SERPL-SCNC: 140 MMOL/L
WBC # FLD AUTO: 6.64 K/UL

## 2023-06-05 ENCOUNTER — TRANSCRIPTION ENCOUNTER (OUTPATIENT)
Age: 53
End: 2023-06-05

## 2023-06-05 NOTE — ASU PATIENT PROFILE, ADULT - FALL HARM RISK - UNIVERSAL INTERVENTIONS
Bed in lowest position, wheels locked, appropriate side rails in place/Call bell, personal items and telephone in reach/Instruct patient to call for assistance before getting out of bed or chair/Non-slip footwear when patient is out of bed/[place] to call system/Physically safe environment - no spills, clutter or unnecessary equipment/Purposeful Proactive Rounding/Room/bathroom lighting operational, light cord in reach

## 2023-06-06 ENCOUNTER — OUTPATIENT (OUTPATIENT)
Dept: OUTPATIENT SERVICES | Facility: HOSPITAL | Age: 53
LOS: 1 days | Discharge: HOME | End: 2023-06-06
Payer: COMMERCIAL

## 2023-06-06 ENCOUNTER — APPOINTMENT (OUTPATIENT)
Dept: UROLOGY | Facility: AMBULATORY SURGERY CENTER | Age: 53
End: 2023-06-06

## 2023-06-06 ENCOUNTER — TRANSCRIPTION ENCOUNTER (OUTPATIENT)
Age: 53
End: 2023-06-06

## 2023-06-06 ENCOUNTER — RESULT REVIEW (OUTPATIENT)
Age: 53
End: 2023-06-06

## 2023-06-06 VITALS
WEIGHT: 179.68 LBS | RESPIRATION RATE: 14 BRPM | HEIGHT: 63 IN | SYSTOLIC BLOOD PRESSURE: 138 MMHG | TEMPERATURE: 98 F | DIASTOLIC BLOOD PRESSURE: 68 MMHG | OXYGEN SATURATION: 100 % | HEART RATE: 71 BPM

## 2023-06-06 VITALS
DIASTOLIC BLOOD PRESSURE: 64 MMHG | SYSTOLIC BLOOD PRESSURE: 132 MMHG | TEMPERATURE: 97 F | RESPIRATION RATE: 15 BRPM | OXYGEN SATURATION: 97 % | HEART RATE: 70 BPM

## 2023-06-06 DIAGNOSIS — Z98.890 OTHER SPECIFIED POSTPROCEDURAL STATES: Chronic | ICD-10-CM

## 2023-06-06 PROCEDURE — 88300 SURGICAL PATH GROSS: CPT | Mod: 26

## 2023-06-06 PROCEDURE — 74420 UROGRAPHY RTRGR +-KUB: CPT | Mod: 26,LT

## 2023-06-06 PROCEDURE — 52356 CYSTO/URETERO W/LITHOTRIPSY: CPT | Mod: LT

## 2023-06-06 DEVICE — GUIDEWIRE SENSOR DUAL-FLEX NITINOL STRAIGHT .035" X 150CM: Type: IMPLANTABLE DEVICE | Site: LEFT | Status: FUNCTIONAL

## 2023-06-06 DEVICE — URETERAL STENT TRIA SOFT 6FR 24MM: Type: IMPLANTABLE DEVICE | Site: LEFT | Status: FUNCTIONAL

## 2023-06-06 DEVICE — URETERAL SHEATH NAVIGATOR HD 12/14FR X 36CM: Type: IMPLANTABLE DEVICE | Site: LEFT | Status: FUNCTIONAL

## 2023-06-06 DEVICE — STONE BASKET DAKOTA 1.9FR 8MMX120CM: Type: IMPLANTABLE DEVICE | Site: LEFT | Status: FUNCTIONAL

## 2023-06-06 DEVICE — LASER FIBER SOLTIVE 200: Type: IMPLANTABLE DEVICE | Site: LEFT | Status: FUNCTIONAL

## 2023-06-06 DEVICE — GUIDEWIRE AMPLATZ SUPER-STIFF STRAIGHT .038" X 260CM: Type: IMPLANTABLE DEVICE | Site: LEFT | Status: FUNCTIONAL

## 2023-06-06 RX ORDER — SODIUM CHLORIDE 9 MG/ML
500 INJECTION, SOLUTION INTRAVENOUS
Refills: 0 | Status: DISCONTINUED | OUTPATIENT
Start: 2023-06-06 | End: 2023-06-06

## 2023-06-06 RX ORDER — FENTANYL CITRATE 50 UG/ML
50 INJECTION INTRAVENOUS
Refills: 0 | Status: DISCONTINUED | OUTPATIENT
Start: 2023-06-06 | End: 2023-06-06

## 2023-06-06 RX ORDER — ACETAMINOPHEN 500 MG
1000 TABLET ORAL ONCE
Refills: 0 | Status: COMPLETED | OUTPATIENT
Start: 2023-06-06 | End: 2023-06-06

## 2023-06-06 RX ORDER — TAMSULOSIN HYDROCHLORIDE 0.4 MG/1
1 CAPSULE ORAL
Qty: 7 | Refills: 0
Start: 2023-06-06 | End: 2023-06-12

## 2023-06-06 RX ORDER — PREGABALIN 225 MG/1
1 CAPSULE ORAL
Qty: 0 | Refills: 0 | DISCHARGE

## 2023-06-06 RX ORDER — ONDANSETRON 8 MG/1
4 TABLET, FILM COATED ORAL ONCE
Refills: 0 | Status: DISCONTINUED | OUTPATIENT
Start: 2023-06-06 | End: 2023-06-06

## 2023-06-06 RX ORDER — OLMESARTAN MEDOXOMIL 5 MG/1
1 TABLET, FILM COATED ORAL
Refills: 0 | DISCHARGE

## 2023-06-06 RX ORDER — CHOLECALCIFEROL (VITAMIN D3) 125 MCG
1 CAPSULE ORAL
Qty: 0 | Refills: 0 | DISCHARGE

## 2023-06-06 RX ORDER — OXYCODONE HYDROCHLORIDE 5 MG/1
5 TABLET ORAL ONCE
Refills: 0 | Status: DISCONTINUED | OUTPATIENT
Start: 2023-06-06 | End: 2023-06-06

## 2023-06-06 RX ORDER — PHENAZOPYRIDINE HCL 100 MG
100 TABLET ORAL ONCE
Refills: 0 | Status: DISCONTINUED | OUTPATIENT
Start: 2023-06-06 | End: 2023-06-06

## 2023-06-06 RX ORDER — LABETALOL HCL 100 MG
1 TABLET ORAL
Qty: 0 | Refills: 0 | DISCHARGE

## 2023-06-06 RX ORDER — OXYBUTYNIN CHLORIDE 5 MG
1 TABLET ORAL
Qty: 9 | Refills: 0
Start: 2023-06-06 | End: 2023-06-08

## 2023-06-06 RX ORDER — OXYBUTYNIN CHLORIDE 5 MG
5 TABLET ORAL EVERY 8 HOURS
Refills: 0 | Status: DISCONTINUED | OUTPATIENT
Start: 2023-06-06 | End: 2023-06-06

## 2023-06-06 RX ORDER — TAMSULOSIN HYDROCHLORIDE 0.4 MG/1
0.4 CAPSULE ORAL ONCE
Refills: 0 | Status: DISCONTINUED | OUTPATIENT
Start: 2023-06-06 | End: 2023-06-06

## 2023-06-06 RX ADMIN — Medication 1000 MILLIGRAM(S): at 12:49

## 2023-06-06 NOTE — PRE-ANESTHESIA EVALUATION ADULT - NSANTHPMHFT_GEN_ALL_CORE
METS>4 METS>4  denies cervical disc herniation, reports lumbar herniation s/p discectomy c/b post-op infection

## 2023-06-06 NOTE — BRIEF OPERATIVE NOTE - NSICDXBRIEFPROCEDURE_GEN_ALL_CORE_FT
PROCEDURES:  Ureteroscopy with laser lithotripsy and stent placement 06-Jun-2023 16:08:30  Kel Lew

## 2023-06-06 NOTE — ASU DISCHARGE PLAN (ADULT/PEDIATRIC) - NS MD DC FALL RISK RISK
For information on Fall & Injury Prevention, visit: [email or web address] OR  [email or web address] OR  https://www.cdc.gov/steadi/patient.html

## 2023-06-06 NOTE — BRIEF OPERATIVE NOTE - OPERATION/FINDINGS
cysto, left ureteroscopy with laser lithotripsy, stent insertion. 1.2 cm largest stone in pelvis. super stiff, dual lumen. flexible URS. stone extraction , stent insertion

## 2023-06-06 NOTE — ASU DISCHARGE PLAN (ADULT/PEDIATRIC) - CARE PROVIDER_API CALL
Roger Hough  Urology  [address] [place] [id number]  Phone: [phone]  Fax: [phone]  Follow Up Time: 1 week

## 2023-06-06 NOTE — ASU DISCHARGE PLAN (ADULT/PEDIATRIC) - ASU DC SPECIAL INSTRUCTIONSFT
URETEROSCOPY    GENERAL: It is common to have blood in your urine after your procedure. It may be pink or even red; and it is important to increase fluid intake to 2-3L of water per day to keep the urine as clear as possible. Please inform your doctor if you have a significant amount of clot in the urine or if you are unable to void at all. The urine may clear and then become bloody again especially as you are more physically active.    STENT: You may have an internal stent (a hollow tube that runs from the kidney to your bladder) after your procedure, which helps urine drain from the kidney to your bladder. Some patients experience urinary frequency, burning, or even back pain (especially with urination). These sensations will gradually get better. Increasing your fluid intake can also improve these symptoms. While the stent is in place, your urine may continue to be bloody. This stent is temporary and must be removed by your urologist as an outpatient with in 3 months unless otherwise specified. If your stent is on a string, it is secured to your leg or genitalia with an adhesive bandage. Do not pull on the string, do not remove the bandage, do not insert anything intravaginally/intraurethrally, and do not engage in sexual intercourse until after the stent is removed at your post-operative appointment.    CATHETER: Some patients are sent home with a Cunningham catheter, while others go home urinating on their own. A Cunningham catheter continuously drains the urine from the bladder. If you still have a catheter, the nurses will review instructions and care before you go home.     UROLOGIC MEDICATIONS:  The following medications may have been sent to your pharmacy for stent related discomfort: Flomax (tamsulosin) 0.4mg at bedtime until stent removed, Ditropan (oxybutynin) 5mg every 8 hours as needed for bladder spasms, and Pyridium (phenazopyridine) 100mg every 8 hours as needed for kidney/bladder discomfort for max 3 days (Pyridium will make your urine orange).     PAIN: You may take Tylenol (acetaminophen) 650-975mg and/or Motrin (ibuprofen) 400-600mg, both available over the counter, for pain every 6 hours as needed. Do not exceed 4000mg of Tylenol (acetaminophen) daily. You may alternate these medications such that you take one or the other every 3 hours for around the clock pain coverage. If you have a stent, the following medications may have been sent to your pharmacy for stent related discomfort: Flomax (tamsulosin) 0.4mg at bedtime until stent removed, Ditropan (oxybutynin) 5mg every 8 hours as needed for bladder spasms, and Pyridium (phenazopyridine) 100mg every 8 hours as needed for kidney/bladder discomfort for max 3 days (Pyridium will make your urine orange).     ANTIBIOTICS: You may be given a prescription for an antibiotic, please take this medication as instructed and be sure to complete the entire course.     STOOL SOFTENERS: Do not allow yourself to become constipated as straining may cause bleeding. Take stool softeners or a laxative (ex. Miralax, Colace, Senokot, ExLax, etc), available over the counter, if needed.    ANTICOAGULATION: If you are taking any blood thinning medications, please discuss with your urologist prior to restarting these medications unless otherwise specified.    BATHING: You may shower or bathe.    DIET: You may resume your regular diet and regular medication regimen.    ACTIVITY: No heavy lifting or strenuous exercise until you are evaluated at your post-operative appointment. Otherwise, you may return to your usual level of physical activity.    FOLLOW-UP: If you did not already schedule your post-operative appointment, please call your urologist to schedule and follow-up appointment.    CALL YOUR UROLOGIST IF: You have any bleeding that does not stop, inability to void >8 hours, fever over 100.4 F, chills, persistent nausea/vomiting, changes in your incision concerning for infection, or if your pain is not controlled on your discharge pain medications. URETEROSCOPY    GENERAL: It is common to have blood in your urine after your procedure. It may be pink or even red; and it is important to increase fluid intake to 2-3L of water per day to keep the urine as clear as possible. Please inform your doctor if you have a significant amount of clot in the urine or if you are unable to void at all. The urine may clear and then become bloody again especially as you are more physically active.    STENT: You may have an internal stent (a hollow tube that runs from the kidney to your bladder) after your procedure, which helps urine drain from the kidney to your bladder. Some patients experience urinary frequency, burning, or even back pain (especially with urination). These sensations will gradually get better. Increasing your fluid intake can also improve these symptoms. While the stent is in place, your urine may continue to be bloody. This stent is temporary and must be removed by your urologist as an outpatient with in 3 months unless otherwise specified.     UROLOGIC MEDICATIONS:  The following medications may have been sent to your pharmacy for stent related discomfort: Flomax (tamsulosin) 0.4mg at bedtime until stent removed, Ditropan (oxybutynin) 5mg every 8 hours as needed for bladder spasms    PAIN: You may take Tylenol (acetaminophen) 650-975mg and/or Motrin (ibuprofen) 400-600mg, both available over the counter, for pain every 6 hours as needed. Do not exceed 4000mg of Tylenol (acetaminophen) daily. You may alternate these medications such that you take one or the other every 3 hours for around the clock pain coverage.    ANTIBIOTICS: You are given a prescription for an antibiotic, please take this medication as instructed and be sure to complete the entire course.     STOOL SOFTENERS: Do not allow yourself to become constipated as straining may cause bleeding. Take stool softeners or a laxative (ex. Miralax, Colace, Senokot, ExLax, etc), available over the counter, if needed.    ANTICOAGULATION: If you are taking any blood thinning medications, please discuss with your urologist prior to restarting these medications unless otherwise specified.    BATHING: You may shower or bathe.    DIET: You may resume your regular diet and regular medication regimen.    ACTIVITY: No heavy lifting or strenuous exercise until you are evaluated at your post-operative appointment. Otherwise, you may return to your usual level of physical activity.    FOLLOW-UP: If you did not already schedule your post-operative appointment, please call your urologist to schedule and follow-up appointment.    CALL YOUR UROLOGIST IF: You have any bleeding that does not stop, inability to void >8 hours, fever over 100.4 F, chills, persistent nausea/vomiting, changes in your incision concerning for infection, or if your pain is not controlled on your discharge pain medications.

## 2023-06-07 ENCOUNTER — NON-APPOINTMENT (OUTPATIENT)
Age: 53
End: 2023-06-07

## 2023-06-08 PROBLEM — N20.0 CALCULUS OF KIDNEY: Chronic | Status: ACTIVE | Noted: 2023-06-05

## 2023-06-08 LAB
CULTURE RESULTS: NO GROWTH — SIGNIFICANT CHANGE UP
SPECIMEN SOURCE: SIGNIFICANT CHANGE UP

## 2023-06-12 RX ORDER — OXYBUTYNIN CHLORIDE 5 MG/1
5 TABLET ORAL
Qty: 10 | Refills: 0 | Status: ACTIVE | COMMUNITY
Start: 2023-06-12 | End: 1900-01-01

## 2023-06-12 RX ORDER — IBUPROFEN 600 MG/1
600 TABLET, FILM COATED ORAL 3 TIMES DAILY
Qty: 15 | Refills: 0 | Status: ACTIVE | COMMUNITY
Start: 2023-06-12 | End: 1900-01-01

## 2023-06-13 ENCOUNTER — APPOINTMENT (OUTPATIENT)
Dept: UROLOGY | Facility: CLINIC | Age: 53
End: 2023-06-13
Payer: COMMERCIAL

## 2023-06-13 LAB
BILIRUB UR QL STRIP: NORMAL
CLARITY UR: CLEAR
COLLECTION METHOD: NORMAL
GLUCOSE UR-MCNC: NORMAL
HCG UR QL: 0.2 EU/DL
HGB UR QL STRIP.AUTO: NORMAL
KETONES UR-MCNC: NORMAL
LEUKOCYTE ESTERASE UR QL STRIP: NORMAL
NITRITE UR QL STRIP: NORMAL
PH UR STRIP: 6
PROT UR STRIP-MCNC: 100
SP GR UR STRIP: 1.01

## 2023-06-13 PROCEDURE — 52310 CYSTOSCOPY AND TREATMENT: CPT

## 2023-06-13 PROCEDURE — 81003 URINALYSIS AUTO W/O SCOPE: CPT | Mod: QW

## 2023-06-14 ENCOUNTER — RX RENEWAL (OUTPATIENT)
Age: 53
End: 2023-06-14

## 2023-06-16 ENCOUNTER — APPOINTMENT (OUTPATIENT)
Dept: UROLOGY | Facility: CLINIC | Age: 53
End: 2023-06-16

## 2023-06-19 LAB — SURGICAL PATHOLOGY STUDY: SIGNIFICANT CHANGE UP

## 2023-06-20 LAB
CELL MATERIAL STONE EST-MCNT: SIGNIFICANT CHANGE UP
LABORATORY COMMENT REPORT: SIGNIFICANT CHANGE UP
NIDUS STONE QN: SIGNIFICANT CHANGE UP

## 2023-06-26 ENCOUNTER — NON-APPOINTMENT (OUTPATIENT)
Age: 53
End: 2023-06-26

## 2023-07-13 ENCOUNTER — APPOINTMENT (OUTPATIENT)
Dept: UROLOGY | Facility: CLINIC | Age: 53
End: 2023-07-13
Payer: COMMERCIAL

## 2023-07-13 PROCEDURE — 76705 ECHO EXAM OF ABDOMEN: CPT

## 2023-07-13 PROCEDURE — 81003 URINALYSIS AUTO W/O SCOPE: CPT | Mod: QW

## 2023-07-14 LAB
BILIRUB UR QL STRIP: NORMAL
CLARITY UR: CLEAR
COLLECTION METHOD: NORMAL
GLUCOSE UR-MCNC: NORMAL
HCG UR QL: 0.2 EU/DL
HGB UR QL STRIP.AUTO: NORMAL
KETONES UR-MCNC: NORMAL
LEUKOCYTE ESTERASE UR QL STRIP: NORMAL
NITRITE UR QL STRIP: NORMAL
PH UR STRIP: 6.5
PROT UR STRIP-MCNC: NORMAL
SP GR UR STRIP: 1.01

## 2023-07-17 LAB — BACTERIA UR CULT: ABNORMAL

## 2023-10-12 ENCOUNTER — APPOINTMENT (OUTPATIENT)
Dept: UROLOGY | Facility: CLINIC | Age: 53
End: 2023-10-12
Payer: COMMERCIAL

## 2023-10-12 VITALS
SYSTOLIC BLOOD PRESSURE: 117 MMHG | TEMPERATURE: 98.6 F | BODY MASS INDEX: 31.89 KG/M2 | OXYGEN SATURATION: 96 % | HEART RATE: 90 BPM | HEIGHT: 63 IN | DIASTOLIC BLOOD PRESSURE: 76 MMHG | WEIGHT: 180 LBS

## 2023-10-12 DIAGNOSIS — N20.0 CALCULUS OF KIDNEY: ICD-10-CM

## 2023-10-12 DIAGNOSIS — R82.991 HYPOCITRATURIA: ICD-10-CM

## 2023-10-12 PROCEDURE — 99214 OFFICE O/P EST MOD 30 MIN: CPT

## 2023-10-27 ENCOUNTER — APPOINTMENT (OUTPATIENT)
Dept: CT IMAGING | Facility: CLINIC | Age: 53
End: 2023-10-27
Payer: COMMERCIAL

## 2023-10-27 ENCOUNTER — OUTPATIENT (OUTPATIENT)
Dept: OUTPATIENT SERVICES | Facility: HOSPITAL | Age: 53
LOS: 1 days | End: 2023-10-27

## 2023-10-27 DIAGNOSIS — Z98.890 OTHER SPECIFIED POSTPROCEDURAL STATES: Chronic | ICD-10-CM

## 2023-10-27 PROCEDURE — 74176 CT ABD & PELVIS W/O CONTRAST: CPT | Mod: 26

## 2024-01-10 NOTE — ASU PATIENT PROFILE, ADULT - DOES PATIENT HAVE ADVANCE DIRECTIVE
Care plan reviewed  Problem: Adult Inpatient Plan of Care  Goal: Plan of Care Review  Outcome: Ongoing, Progressing     Problem: Adult Inpatient Plan of Care  Goal: Patient-Specific Goal (Individualized)  Outcome: Ongoing, Progressing     Problem: Adult Inpatient Plan of Care  Goal: Absence of Hospital-Acquired Illness or Injury  Outcome: Ongoing, Progressing     Problem: Adult Inpatient Plan of Care  Goal: Optimal Comfort and Wellbeing  Outcome: Ongoing, Progressing     Problem: Adult Inpatient Plan of Care  Goal: Readiness for Transition of Care  Outcome: Ongoing, Progressing      No

## 2024-01-19 NOTE — BRIEF OPERATIVE NOTE - BRIEF OP NOTE DRAINS
[facility]  Cardiac Electrophysiology  [address]  [place] [address]   Office: [phone]  Fax: [phone]     Cardiac Electrophysiology - Loop Recorder Implantation Discharge Instructions      PROCEDURE   ILR (implantable loop recorder) placement         MEDICATION INSTRUCTIONS   Continue taking home meds          WOUND CARE   Leave the large overlying dressing in place until 2 days after discharge - this dressing can thereafter be removed by gently pulling off.  Underneath the large dressing will be steri-strips. DO NOT REMOVE these strips; please leave these in place until you are seen in clinic.  DO NOT COVER the site with any bandages or dressings. The site should be kept dry for 3-5 days - please avoid traditional showers or baths during this time.  Keep the site clean and dry.  No swimming, sauna, or hot tub use until incision is completely healed.         ACTIVITY   It takes approximately 1-2 weeks for your incision to heal.  During this time use extra precautions - please avoid the following:  Raising your arm over your head or stretching behind your back (no hyperflexion)  Pushing or pulling (mowing the lawn, vacuuming)  Lifting anything heavier than 10 pounds or a gallon of milk  Sudden or extreme motions  Be physically active every day.  Moving your arm is important         REMOTE MONITORING   You will receive a remote transmission station to monitor your device from home  Please set the transmitter up as soon as you get home from the hospital.  Directions are included in the box, and you may call our office or the company technical support line if you need assistance connecting your transmitter.  Please send a transmission the day after you go home  Automated transmissions will be sent every 3 months or sooner if device issues are detected.  You may manually send in transmissions if you are having arrhythmia symptoms or think there may be a problem with  your device.  Please call our office at [phone] if you send in a transmission off-schedule         WHAT TO WATCH OUT FOR   Contact our office or seek emergency care for any of the following:  Drainage, bleeding or oozing from the site  Redness, increased swelling, or warmth around the device site  Pain not treated with prescribed pain medication  Temperature greater than 100.4F  Chest pain, shortness of breath, dizziness/fainting         FOLLOW-UP   Our office will coordinate a follow-up visit visit in clinic for a device interrogation and an incision check 7-14 days after your procedure.   Please contact us at [phone] any issues during your recovery.          BRIJESH

## 2024-03-04 NOTE — PRE-OP CHECKLIST - SKIN PREP
Patient called wanting to know if she could discuss her liver enzymes with Dr. Fulton. Please advise.    n/a

## 2024-04-23 NOTE — PHYSICAL THERAPY INITIAL EVALUATION ADULT - SITTING BALANCE: STATIC
Mateusz Beckwith MD  Interventional Cardiology / Advance Heart Failure and Cardiac Transplant Specialist  [place] Office : [phone] [place] [address] [place] [address]  Tel:   [place] Office : [address] [facility] [place] [address]  Tel: [phone]     Dr. Demetri Murphy will cover our service for 04/24/24 and 04/25/24 cont: [phone]    Pt is lying in bed comfortable not in distress, s/p ureteral stenting doing well  	  MEDICATIONS:  amLODIPine   Tablet 5 milliGRAM(s) Oral daily  aspirin enteric coated 81 milliGRAM(s) Oral daily  heparin   Injectable 5000 Unit(s) SubCutaneous every 8 hours  metoprolol tartrate 25 milliGRAM(s) Oral at bedtime    ertapenem  IVPB 1000 milliGRAM(s) IV Intermittent every 24 hours      acetaminophen     Tablet .. 650 milliGRAM(s) Oral every 6 hours PRN  baclofen 20 milliGRAM(s) Oral every 6 hours PRN  gabapentin 300 milliGRAM(s) Oral at bedtime  levETIRAcetam  milliGRAM(s) Oral at bedtime  meclizine 12.5 milliGRAM(s) Oral every 6 hours PRN  pregabalin 150 milliGRAM(s) Oral daily    pantoprazole    Tablet 40 milliGRAM(s) Oral before breakfast    atorvastatin 40 milliGRAM(s) Oral at bedtime    chlorhexidine 2% Cloths 1 Application(s) Topical <User Schedule>  influenza  Vaccine (HIGH DOSE) 0.7 milliLiter(s) IntraMuscular once  sodium chloride 0.9%. 1000 milliLiter(s) IV Continuous <Continuous>  tamsulosin 0.4 milliGRAM(s) Oral at bedtime      PAST MEDICAL/SURGICAL HISTORY  PAST MEDICAL & SURGICAL HISTORY:  Myocardial infarction  KAEL x1 MI 2005, stent RCA      Back pain  Chronic back pain      Spinal stenosis      Lumbar herniated disc      Hypertension      Narcotic dependence  5/28/16 for withdrawal ,pt currently on Dialudid 8 mg every 4-6 hours /day      Osteoarthritis      GERD (gastroesophageal reflux disease)      Cerebral aneurysm rupture  1997 clipped, no residual      Femur fracture, right  2/16, surgical revision of right hip      Sacroiliitis, not elsewhere classified  Unspecified Inflammatory spondylopathy,Sacral and sacrococcygeal region      Brain aneurysm  s/p clipping, not compatible with MRI      Seizures      CAD (coronary artery disease)  s/p MI and RCA stenting      Chronic pain  Patient has an Intrathecal pump s/p removal in 2016      Cerebral aneurysm  I997 with clips      S/P lumbar fusion  L1-S1:2002      History of right inguinal hernia repair  x2      Hx of appendectomy  6/1969      Hx of laminectomy  lumbar-2002      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.-11 yo      Stented coronary artery  KAEL x 1 to RCA      History of hip replacement  8/15, revision 2/16 after falling and fracturing right femur      S/P left knee arthroscopy      H/O arthroscopy of shoulder  right X 2, left X 1      History of throat surgery  surgical exicision cyst 1986      Fusion of sacral region of spine  5/2017      History of back surgery  x5      S/P inguinal hernia repair      H/O fracture of femur  s/p repair          SOCIAL HISTORY: Substance Use (street drugs): ( x ) never used  (  ) other:    FAMILY HISTORY:  No pertinent family history in first degree relatives               PHYSICAL EXAM:  T(C): 36.6 (04-23-24 @ 12:00), Max: 36.7 (04-22-24 @ 21:24)  HR: 58 (04-23-24 @ 12:00) (58 - 67)  BP: 116/66 (04-23-24 @ 12:00) (116/66 - 139/72)  RR: 18 (04-23-24 @ 12:00) (18 - 18)  SpO2: 95% (04-23-24 @ 12:00) (95% - 95%)  Wt(kg): --  I&O's Summary        GENERAL: NAD   EYES: EOMI, PERRLA, conjunctiva and sclera clear  ENMT: No tonsillar erythema, exudates, or enlargement; Moist mucous membranes, Good dentition, No lesions  Cardiovascular: Normal S1 S2, No JVD, No murmurs, No edema  Respiratory: Lungs clear to auscultation	  Gastrointestinal:  Soft, Non-tender, + BS	  Extremities: Normal range of motion, No clubbing, cyanosis or edema  LYMPH: No lymphadenopathy noted  NERVOUS SYSTEM:  Alert & Oriented X3, Good concentration; Motor Strength 5/5 B/L upper and lower extremities; DTRs 2+ intact and symmetric                      proBNP:   Lipid Profile:   HgA1c:   TSH:     Consultant(s) Notes Reviewed:  [x ] YES  [ ] NO    Care Discussed with Consultants/Other Providers [ x] YES  [ ] NO    Imaging Personally Reviewed independently:  [x] YES  [ ] NO    All labs, radiologic studies, vitals, orders and medications list reviewed. Patient is seen and examined at bedside. Case discussed with medical team.         good balance

## 2025-05-13 NOTE — PROGRESS NOTE ADULT - SUBJECTIVE AND OBJECTIVE BOX
Someone from the  urology team will contact you to schedule your follow-up appointment.   AM rounds   Pt ambulating/ sitting OOB . Sister at bedside   Pt: fells slightly better after  fever, chills and sl hypothermia this morning  Denies oral pain    No acute events o/n  Vital Signs Last 24 Hrs  T(C): 37.1 (04 Apr 2019 12:51), Max: 39.2 (04 Apr 2019 00:15)  T(F): 98.8 (04 Apr 2019 12:51), Max: 102.5 (04 Apr 2019 00:15)  HR: 94 (04 Apr 2019 12:51) (71 - 107)  BP: 113/62 (04 Apr 2019 12:51) (93/62 - 149/67)  BP(mean): --  RR: 17 (04 Apr 2019 12:51) (17 - 18)    I&O's Summary    03 Apr 2019 07:01  -  04 Apr 2019 07:00  --------------------------------------------------------  IN: 0 mL / OUT: 2 mL / NET: -2 mL    04 Apr 2019 07:01  -  04 Apr 2019 17:41  --------------------------------------------------------  IN: 1250 mL / OUT: 0 mL / NET: 1250 mL        04-04    138  |  100  |  18  ----------------------------<  140<H>  4.7   |  27  |  1.7<H>    Ca    8.4<L>      04 Apr 2019 04:30  Phos  4.0     04-04  Mg     1.6     04-04                         9.9    7.29  )-----------( 371      ( 04 Apr 2019 16:00 )             29.9     Duplex : no DVT bilat LE     EXAM:   faded erythema of rash torso and extremities ; sclera clear ; no intraoral pain or ulcers

## (undated) DEVICE — GLV 7.5 PROTEXIS (WHITE)

## (undated) DEVICE — SOL IRR BAG NS 0.9% 3000ML

## (undated) DEVICE — WARMING BLANKET UPPER ADULT

## (undated) DEVICE — PRESSURE INFUSOR BAG 3000ML

## (undated) DEVICE — PACK CYSTO

## (undated) DEVICE — TUBING RANGER FLUID IRRIGATION SET DISP

## (undated) DEVICE — DRAPE C ARM 41X74"

## (undated) DEVICE — DRAPE TOWEL BLUE 17" X 24"

## (undated) DEVICE — VENODYNE/SCD SLEEVE CALF MEDIUM

## (undated) DEVICE — GLV 7 PROTEXIS (WHITE)

## (undated) DEVICE — BOSTON SCIENTIFIC UROLOK II SCOPE ADAPTOR